# Patient Record
Sex: MALE | Race: WHITE | Employment: OTHER | URBAN - METROPOLITAN AREA
[De-identification: names, ages, dates, MRNs, and addresses within clinical notes are randomized per-mention and may not be internally consistent; named-entity substitution may affect disease eponyms.]

---

## 2018-11-25 ENCOUNTER — HOSPITAL ENCOUNTER (EMERGENCY)
Age: 73
Discharge: HOME | End: 2018-11-25
Payer: MEDICARE

## 2018-11-25 ENCOUNTER — APPOINTMENT (OUTPATIENT)
Dept: CT IMAGING | Age: 73
End: 2018-11-25
Payer: MEDICARE

## 2018-11-25 VITALS
DIASTOLIC BLOOD PRESSURE: 66 MMHG | SYSTOLIC BLOOD PRESSURE: 122 MMHG | TEMPERATURE: 97.8 F | HEIGHT: 69 IN | OXYGEN SATURATION: 92 % | RESPIRATION RATE: 18 BRPM | BODY MASS INDEX: 35.1 KG/M2 | WEIGHT: 237 LBS | HEART RATE: 92 BPM

## 2018-11-25 DIAGNOSIS — R19.7 DIARRHEA, UNSPECIFIED TYPE: Primary | ICD-10-CM

## 2018-11-25 LAB
A/G RATIO: 0.9 (ref 1.1–2.2)
ALBUMIN SERPL-MCNC: 3.6 G/DL (ref 3.4–5)
ALP BLD-CCNC: 99 U/L (ref 40–129)
ALT SERPL-CCNC: 17 U/L (ref 10–40)
ANION GAP SERPL CALCULATED.3IONS-SCNC: 13 MMOL/L (ref 3–16)
AST SERPL-CCNC: 41 U/L (ref 15–37)
BASOPHILS ABSOLUTE: 0.1 K/UL (ref 0–0.2)
BASOPHILS RELATIVE PERCENT: 0.9 %
BILIRUB SERPL-MCNC: 0.7 MG/DL (ref 0–1)
BUN BLDV-MCNC: 9 MG/DL (ref 7–20)
CALCIUM SERPL-MCNC: 9.1 MG/DL (ref 8.3–10.6)
CHLORIDE BLD-SCNC: 101 MMOL/L (ref 99–110)
CO2: 25 MMOL/L (ref 21–32)
CREAT SERPL-MCNC: 0.9 MG/DL (ref 0.8–1.3)
EOSINOPHILS ABSOLUTE: 0 K/UL (ref 0–0.6)
EOSINOPHILS RELATIVE PERCENT: 0.1 %
GFR AFRICAN AMERICAN: >60
GFR NON-AFRICAN AMERICAN: >60
GLOBULIN: 4.2 G/DL
GLUCOSE BLD-MCNC: 71 MG/DL (ref 70–99)
HCT VFR BLD CALC: 47.5 % (ref 40.5–52.5)
HEMOGLOBIN: 15.8 G/DL (ref 13.5–17.5)
LYMPHOCYTES ABSOLUTE: 2.5 K/UL (ref 1–5.1)
LYMPHOCYTES RELATIVE PERCENT: 31.7 %
MCH RBC QN AUTO: 31.1 PG (ref 26–34)
MCHC RBC AUTO-ENTMCNC: 33.3 G/DL (ref 31–36)
MCV RBC AUTO: 93.3 FL (ref 80–100)
MONOCYTES ABSOLUTE: 0.7 K/UL (ref 0–1.3)
MONOCYTES RELATIVE PERCENT: 9.6 %
NEUTROPHILS ABSOLUTE: 4.5 K/UL (ref 1.7–7.7)
NEUTROPHILS RELATIVE PERCENT: 57.7 %
PDW BLD-RTO: 17.9 % (ref 12.4–15.4)
PLATELET # BLD: 205 K/UL (ref 135–450)
PMV BLD AUTO: 8.8 FL (ref 5–10.5)
POTASSIUM SERPL-SCNC: 4.1 MMOL/L (ref 3.5–5.1)
RBC # BLD: 5.09 M/UL (ref 4.2–5.9)
SODIUM BLD-SCNC: 139 MMOL/L (ref 136–145)
TOTAL PROTEIN: 7.8 G/DL (ref 6.4–8.2)
WBC # BLD: 7.8 K/UL (ref 4–11)

## 2018-11-25 PROCEDURE — 85025 COMPLETE CBC W/AUTO DIFF WBC: CPT

## 2018-11-25 PROCEDURE — 99284 EMERGENCY DEPT VISIT MOD MDM: CPT

## 2018-11-25 PROCEDURE — 80053 COMPREHEN METABOLIC PANEL: CPT

## 2018-11-25 PROCEDURE — 74176 CT ABD & PELVIS W/O CONTRAST: CPT

## 2018-11-25 PROCEDURE — 2580000003 HC RX 258: Performed by: PHYSICIAN ASSISTANT

## 2018-11-25 RX ORDER — 0.9 % SODIUM CHLORIDE 0.9 %
1000 INTRAVENOUS SOLUTION INTRAVENOUS ONCE
Status: COMPLETED | OUTPATIENT
Start: 2018-11-25 | End: 2018-11-25

## 2018-11-25 RX ORDER — DIPHENOXYLATE HYDROCHLORIDE AND ATROPINE SULFATE 2.5; .025 MG/1; MG/1
1 TABLET ORAL 4 TIMES DAILY PRN
Qty: 20 TABLET | Refills: 0 | Status: SHIPPED | OUTPATIENT
Start: 2018-11-25 | End: 2018-12-05

## 2018-11-25 RX ADMIN — SODIUM CHLORIDE 1000 ML: 9 INJECTION, SOLUTION INTRAVENOUS at 22:00

## 2018-11-25 ASSESSMENT — ENCOUNTER SYMPTOMS
BLOOD IN STOOL: 0
SHORTNESS OF BREATH: 0
ABDOMINAL PAIN: 0
BACK PAIN: 0
VOMITING: 0
NAUSEA: 0
DIARRHEA: 1

## 2018-11-26 NOTE — ED PROVIDER NOTES
levels. Peritoneum/Retroperitoneum: No free air or free fluid. Calcified mesenteric masses are present, the ventral more superior mass measuring 26 x 19 mm in axial plane and 27 mm craniocaudal in the more inferior left lateral mass measuring 28 x 20 x 17 mm. Vascular:  Normal caliber abdominal aorta with heavy aortoiliac and branch vessel atherosclerotic calcifications PELVIS Genitourinary: Normal urinary bladder. Normal reproductive organs. Other: No free fluid. No enlarged lymph nodes. MUSCULOSKELETAL Bones and Soft Tissues: Partially imaged ununited sternotomy with nodular partially calcified areas along the sternum, possibly fat necrosis. Advanced lumbar spondylosis. Severe canal narrowing at L4-L5. Severe foraminal stenosis at L5-S1. Other: Subpleural reticular abnormality with traction bronchiolectasis at the lung bases. Mosaic attenuation with questionable ground-glass abnormality. Left pleural effusion with mild pleural thickening. Included heart demonstrates multivessel coronary calcifications and postsurgical changes of prior CABG. No acute abdominopelvic findings. Colonic air-fluid levels, consistent with history of diarrheal illness. Coarsely calcified mesenteric masses adjacent to loops of small bowel are indeterminate. 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 Left renal cortical scarring and volume loss. No evidence of urinary tract stone disease or obstructive uropathy. Features of idiopathic interstitial lung disease. Comparison imaging would be helpful if available. Recommend outpatient pulmonary medicine consultation if the patient is not already actively being followed by a pulmonologist. Neelam Joe left pleural effusion, presumably chronic given peripheral pleural thickening.         MEDICAL DECISION MAKING / ED COURSE:      PROCEDURES:   Procedures    None    Patient was given:  Medications 0.9 % sodium chloride bolus (0 mLs Intravenous Stopped 11/25/18 8160)       Patient present with diarrhea. No episodes of diarrhea here. His been a chronic issue. He does have history of gastric cancer. He is followed by a pulmonologist and a GI doctor in [place] where he lives and he is going back there in 2 days. Abdominal exam is benign denies any pain. Laboratory testing is unremarkable. There is multiple incidental findings on CT and the patient states he gets CTs every 6 months due to his history with his doctors in [place]. He will follow up closely with him. I have a low suspicion for bacterial etiology requiring emergent antibiotics for his diarrhea, Clostridium difficile, diverticulitis, bacterial colitis, appendicitis, perforated viscus, obstruction or other emergent etiology. Patient will follow up with outpatient return here for any worsening symptoms or problems. The patient tolerated their visit well. I evaluated the patient. The physician was available for consultation as needed. The patient and / or the family were informed of the results of anytests, a time was given to answer questions, a plan was proposed and they agreed with plan. CLINICAL IMPRESSION:  1. Diarrhea, unspecified type        DISPOSITION Decision To Discharge 11/25/2018 11:17:15 PM      PATIENT REFERRED TO:  Your pulmonologist and GI doctor in [place] when you return in 2 days    Call   For re-check    [facility] Emergency Department  [address] [place] [address]  [phone]    As needed      DISCHARGE MEDICATIONS:  Discharge Medication List as of 11/25/2018 11:23 PM      START taking these medications    Details   diphenoxylate-atropine (LOMOTIL) 2.5-0.025 MG per tablet Take 1 tablet by mouth 4 times daily as needed for Diarrhea for up to 10 days. ., Disp-20 tablet, R-0Print             DISCONTINUED MEDICATIONS:  Discharge Medication List as of 11/25/2018 11:23

## 2019-01-22 ENCOUNTER — HOSPITAL ENCOUNTER (OUTPATIENT)
Dept: VASCULAR LAB | Age: 74
Discharge: HOME | End: 2019-01-22
Payer: MEDICARE

## 2019-01-22 ENCOUNTER — HOSPITAL ENCOUNTER (OUTPATIENT)
Age: 74
Discharge: HOME | End: 2019-01-22
Payer: MEDICARE

## 2019-01-22 ENCOUNTER — HOSPITAL ENCOUNTER (OUTPATIENT)
Dept: GENERAL RADIOLOGY | Age: 74
Discharge: HOME | End: 2019-01-22
Payer: MEDICARE

## 2019-01-22 DIAGNOSIS — I70.213 ATHEROSCLEROSIS OF NATIVE ARTERY OF BOTH LOWER EXTREMITIES WITH INTERMITTENT CLAUDICATION (HCC): Primary | ICD-10-CM

## 2019-01-22 DIAGNOSIS — R52 PAIN: ICD-10-CM

## 2019-01-22 PROCEDURE — 93922 UPR/L XTREMITY ART 2 LEVELS: CPT

## 2019-01-22 PROCEDURE — 73630 X-RAY EXAM OF FOOT: CPT

## 2019-02-07 ENCOUNTER — HOSPITAL ENCOUNTER (OUTPATIENT)
Dept: WOUND CARE | Age: 74
Discharge: HOME | End: 2019-02-07
Payer: MEDICARE

## 2019-02-07 ENCOUNTER — HOSPITAL ENCOUNTER (OUTPATIENT)
Age: 74
Discharge: HOME | End: 2019-02-07
Payer: MEDICARE

## 2019-02-07 VITALS
HEART RATE: 84 BPM | HEIGHT: 69 IN | DIASTOLIC BLOOD PRESSURE: 83 MMHG | WEIGHT: 208 LBS | BODY MASS INDEX: 30.81 KG/M2 | SYSTOLIC BLOOD PRESSURE: 122 MMHG | RESPIRATION RATE: 16 BRPM

## 2019-02-07 DIAGNOSIS — L97.521 ULCERATED, FOOT, LEFT, LIMITED TO BREAKDOWN OF SKIN (HCC): ICD-10-CM

## 2019-02-07 DIAGNOSIS — L97.521 ULCERATED, FOOT, LEFT, LIMITED TO BREAKDOWN OF SKIN (HCC): Primary | ICD-10-CM

## 2019-02-07 LAB
C-REACTIVE PROTEIN: 5.1 MG/L (ref 0–5.1)
SEDIMENTATION RATE, ERYTHROCYTE: 30 MM/HR (ref 0–20)

## 2019-02-07 PROCEDURE — 87070 CULTURE OTHR SPECIMN AEROBIC: CPT

## 2019-02-07 PROCEDURE — 11042 DBRDMT SUBQ TIS 1ST 20SQCM/<: CPT

## 2019-02-07 PROCEDURE — 86140 C-REACTIVE PROTEIN: CPT

## 2019-02-07 PROCEDURE — 99213 OFFICE O/P EST LOW 20 MIN: CPT

## 2019-02-07 PROCEDURE — 36415 COLL VENOUS BLD VENIPUNCTURE: CPT

## 2019-02-07 PROCEDURE — 85652 RBC SED RATE AUTOMATED: CPT

## 2019-02-07 PROCEDURE — 87077 CULTURE AEROBIC IDENTIFY: CPT

## 2019-02-07 PROCEDURE — 87205 SMEAR GRAM STAIN: CPT

## 2019-02-07 PROCEDURE — 87186 SC STD MICRODIL/AGAR DIL: CPT

## 2019-02-07 RX ORDER — AMOXICILLIN AND CLAVULANATE POTASSIUM 875; 125 MG/1; MG/1
1 TABLET, FILM COATED ORAL 2 TIMES DAILY
Qty: 14 TABLET | Refills: 0 | Status: SHIPPED | OUTPATIENT
Start: 2019-02-07 | End: 2019-02-13

## 2019-02-07 RX ORDER — LIDOCAINE HYDROCHLORIDE 40 MG/ML
SOLUTION TOPICAL ONCE
Status: DISCONTINUED | OUTPATIENT
Start: 2019-02-07 | End: 2019-02-08 | Stop reason: HOSPADM

## 2019-02-07 RX ORDER — NAPROXEN SODIUM 220 MG
220 TABLET ORAL 2 TIMES DAILY WITH MEALS
COMMUNITY
End: 2021-01-01 | Stop reason: CLARIF

## 2019-02-07 RX ORDER — ASPIRIN 325 MG
325 TABLET ORAL DAILY
COMMUNITY
End: 2021-01-01 | Stop reason: CLARIF

## 2019-02-07 ASSESSMENT — PAIN DESCRIPTION - LOCATION: LOCATION: TOE (COMMENT WHICH ONE)

## 2019-02-07 ASSESSMENT — PAIN DESCRIPTION - DESCRIPTORS: DESCRIPTORS: POUNDING

## 2019-02-07 ASSESSMENT — PAIN SCALES - GENERAL: PAINLEVEL_OUTOF10: 2

## 2019-02-07 ASSESSMENT — PAIN DESCRIPTION - PAIN TYPE: TYPE: ACUTE PAIN

## 2019-02-07 ASSESSMENT — PAIN DESCRIPTION - ORIENTATION: ORIENTATION: LEFT

## 2019-02-09 LAB
GRAM STAIN RESULT: ABNORMAL
ORGANISM: ABNORMAL
ORGANISM: ABNORMAL
WOUND/ABSCESS: ABNORMAL

## 2019-02-11 ENCOUNTER — HOSPITAL ENCOUNTER (OUTPATIENT)
Dept: MRI IMAGING | Age: 74
Discharge: HOME | End: 2019-02-11
Payer: MEDICARE

## 2019-02-11 DIAGNOSIS — L97.521 ULCERATED, FOOT, LEFT, LIMITED TO BREAKDOWN OF SKIN (HCC): ICD-10-CM

## 2019-02-11 PROCEDURE — 6360000004 HC RX CONTRAST MEDICATION: Performed by: INTERNAL MEDICINE

## 2019-02-11 PROCEDURE — 2580000003 HC RX 258: Performed by: INTERNAL MEDICINE

## 2019-02-11 PROCEDURE — A9579 GAD-BASE MR CONTRAST NOS,1ML: HCPCS | Performed by: INTERNAL MEDICINE

## 2019-02-11 PROCEDURE — 73723 MRI JOINT LWR EXTR W/O&W/DYE: CPT

## 2019-02-11 RX ORDER — SODIUM CHLORIDE 0.9 % (FLUSH) 0.9 %
10 SYRINGE (ML) INJECTION ONCE
Status: COMPLETED | OUTPATIENT
Start: 2019-02-11 | End: 2019-02-11

## 2019-02-11 RX ADMIN — Medication 10 ML: at 15:38

## 2019-02-11 RX ADMIN — GADOTERIDOL 20 ML: 279.3 INJECTION, SOLUTION INTRAVENOUS at 15:38

## 2019-02-13 ENCOUNTER — HOSPITAL ENCOUNTER (OUTPATIENT)
Dept: WOUND CARE | Age: 74
Discharge: HOME | End: 2019-02-13
Payer: MEDICARE

## 2019-02-13 VITALS
TEMPERATURE: 97.4 F | BODY MASS INDEX: 30.72 KG/M2 | DIASTOLIC BLOOD PRESSURE: 79 MMHG | HEART RATE: 98 BPM | WEIGHT: 208 LBS | SYSTOLIC BLOOD PRESSURE: 113 MMHG

## 2019-02-13 PROCEDURE — 11043 DBRDMT MUSC&/FSCA 1ST 20/<: CPT

## 2019-02-13 RX ORDER — AMOXICILLIN AND CLAVULANATE POTASSIUM 875; 125 MG/1; MG/1
1 TABLET, FILM COATED ORAL 2 TIMES DAILY
Qty: 14 TABLET | Refills: 0 | Status: SHIPPED | OUTPATIENT
Start: 2019-02-13 | End: 2019-02-13

## 2019-02-13 RX ORDER — AMOXICILLIN AND CLAVULANATE POTASSIUM 875; 125 MG/1; MG/1
1 TABLET, FILM COATED ORAL 2 TIMES DAILY
Qty: 14 TABLET | Refills: 0 | Status: SHIPPED | OUTPATIENT
Start: 2019-02-13 | End: 2019-02-20

## 2019-02-13 RX ORDER — LIDOCAINE 40 MG/G
CREAM TOPICAL PRN
Status: DISCONTINUED | OUTPATIENT
Start: 2019-02-13 | End: 2019-02-14 | Stop reason: HOSPADM

## 2019-02-20 ENCOUNTER — HOSPITAL ENCOUNTER (OUTPATIENT)
Dept: WOUND CARE | Age: 74
Discharge: HOME | End: 2019-02-20
Payer: MEDICARE

## 2019-02-20 VITALS
TEMPERATURE: 98.2 F | SYSTOLIC BLOOD PRESSURE: 148 MMHG | HEART RATE: 90 BPM | RESPIRATION RATE: 16 BRPM | DIASTOLIC BLOOD PRESSURE: 67 MMHG

## 2019-02-20 PROCEDURE — 11042 DBRDMT SUBQ TIS 1ST 20SQCM/<: CPT

## 2019-02-20 RX ORDER — LIDOCAINE 40 MG/G
CREAM TOPICAL PRN
Status: DISCONTINUED | OUTPATIENT
Start: 2019-02-20 | End: 2019-02-21 | Stop reason: HOSPADM

## 2019-02-27 ENCOUNTER — HOSPITAL ENCOUNTER (OUTPATIENT)
Dept: WOUND CARE | Age: 74
Discharge: HOME | End: 2019-02-27
Payer: MEDICARE

## 2019-02-27 VITALS
SYSTOLIC BLOOD PRESSURE: 140 MMHG | HEART RATE: 92 BPM | DIASTOLIC BLOOD PRESSURE: 86 MMHG | RESPIRATION RATE: 16 BRPM | TEMPERATURE: 98.2 F

## 2019-02-27 PROCEDURE — 11042 DBRDMT SUBQ TIS 1ST 20SQCM/<: CPT

## 2019-02-27 RX ORDER — LIDOCAINE 40 MG/G
CREAM TOPICAL PRN
Status: DISCONTINUED | OUTPATIENT
Start: 2019-02-27 | End: 2019-02-28 | Stop reason: HOSPADM

## 2019-03-06 ENCOUNTER — HOSPITAL ENCOUNTER (OUTPATIENT)
Dept: WOUND CARE | Age: 74
Discharge: HOME | End: 2019-03-06

## 2019-03-13 ENCOUNTER — HOSPITAL ENCOUNTER (OUTPATIENT)
Dept: WOUND CARE | Age: 74
Discharge: HOME | End: 2019-03-13
Payer: MEDICARE

## 2019-03-13 VITALS
SYSTOLIC BLOOD PRESSURE: 102 MMHG | RESPIRATION RATE: 18 BRPM | HEART RATE: 82 BPM | DIASTOLIC BLOOD PRESSURE: 69 MMHG | TEMPERATURE: 96.8 F

## 2019-03-13 DIAGNOSIS — I73.9 PAD (PERIPHERAL ARTERY DISEASE) (HCC): ICD-10-CM

## 2019-03-13 DIAGNOSIS — L97.522 SKIN ULCER OF THIRD TOE OF LEFT FOOT WITH FAT LAYER EXPOSED (HCC): Primary | ICD-10-CM

## 2019-03-13 PROCEDURE — 11043 DBRDMT MUSC&/FSCA 1ST 20/<: CPT

## 2019-03-13 RX ORDER — LIDOCAINE 40 MG/G
CREAM TOPICAL PRN
Status: DISCONTINUED | OUTPATIENT
Start: 2019-03-13 | End: 2019-03-14 | Stop reason: HOSPADM

## 2019-03-13 RX ORDER — CLINDAMYCIN HYDROCHLORIDE 300 MG/1
300 CAPSULE ORAL 3 TIMES DAILY
Qty: 42 CAPSULE | Refills: 0 | Status: SHIPPED | OUTPATIENT
Start: 2019-03-13 | End: 2019-03-27

## 2019-03-20 ENCOUNTER — HOSPITAL ENCOUNTER (OUTPATIENT)
Age: 74
Discharge: HOME | End: 2019-03-20
Payer: MEDICARE

## 2019-03-20 ENCOUNTER — HOSPITAL ENCOUNTER (OUTPATIENT)
Dept: VASCULAR LAB | Age: 74
Discharge: HOME | End: 2019-03-20
Payer: MEDICARE

## 2019-03-20 ENCOUNTER — HOSPITAL ENCOUNTER (OUTPATIENT)
Dept: GENERAL RADIOLOGY | Age: 74
Discharge: HOME | End: 2019-03-20
Payer: MEDICARE

## 2019-03-20 DIAGNOSIS — I73.9 PAD (PERIPHERAL ARTERY DISEASE) (HCC): ICD-10-CM

## 2019-03-20 DIAGNOSIS — L97.522 SKIN ULCER OF THIRD TOE OF LEFT FOOT WITH FAT LAYER EXPOSED (HCC): ICD-10-CM

## 2019-03-20 PROCEDURE — 93926 LOWER EXTREMITY STUDY: CPT

## 2019-03-20 PROCEDURE — 73630 X-RAY EXAM OF FOOT: CPT

## 2019-03-22 ENCOUNTER — HOSPITAL ENCOUNTER (OUTPATIENT)
Dept: WOUND CARE | Age: 74
Discharge: HOME | End: 2019-03-22
Payer: MEDICARE

## 2019-03-22 VITALS
RESPIRATION RATE: 16 BRPM | SYSTOLIC BLOOD PRESSURE: 114 MMHG | TEMPERATURE: 96.9 F | HEART RATE: 83 BPM | DIASTOLIC BLOOD PRESSURE: 73 MMHG

## 2019-03-22 PROCEDURE — 99212 OFFICE O/P EST SF 10 MIN: CPT

## 2020-01-01 ENCOUNTER — APPOINTMENT (OUTPATIENT)
Dept: GENERAL RADIOLOGY | Age: 75
End: 2020-01-01
Payer: MEDICARE

## 2020-01-01 ENCOUNTER — APPOINTMENT (OUTPATIENT)
Dept: CT IMAGING | Age: 75
End: 2020-01-01
Payer: MEDICARE

## 2020-01-01 ENCOUNTER — HOSPITAL ENCOUNTER (EMERGENCY)
Age: 75
Discharge: HOME | End: 2020-10-19
Attending: EMERGENCY MEDICINE
Payer: MEDICARE

## 2020-01-01 VITALS
DIASTOLIC BLOOD PRESSURE: 71 MMHG | WEIGHT: 185 LBS | SYSTOLIC BLOOD PRESSURE: 105 MMHG | HEART RATE: 84 BPM | TEMPERATURE: 97.8 F | OXYGEN SATURATION: 96 % | HEIGHT: 70 IN | RESPIRATION RATE: 20 BRPM | BODY MASS INDEX: 26.48 KG/M2

## 2020-01-01 PROCEDURE — 73564 X-RAY EXAM KNEE 4 OR MORE: CPT

## 2020-01-01 PROCEDURE — 70450 CT HEAD/BRAIN W/O DYE: CPT

## 2020-01-01 PROCEDURE — 72125 CT NECK SPINE W/O DYE: CPT

## 2020-01-01 PROCEDURE — 99284 EMERGENCY DEPT VISIT MOD MDM: CPT

## 2020-01-01 RX ORDER — TRAMADOL HYDROCHLORIDE 50 MG/1
50 TABLET ORAL EVERY 8 HOURS PRN
Qty: 8 TABLET | Refills: 0 | Status: SHIPPED | OUTPATIENT
Start: 2020-01-01 | End: 2020-01-01

## 2020-01-01 ASSESSMENT — PAIN SCALES - GENERAL: PAINLEVEL_OUTOF10: 5

## 2020-01-01 ASSESSMENT — PAIN DESCRIPTION - PAIN TYPE: TYPE: ACUTE PAIN

## 2020-01-01 ASSESSMENT — ENCOUNTER SYMPTOMS
SHORTNESS OF BREATH: 0
ABDOMINAL PAIN: 0
NAUSEA: 0
COUGH: 0
WHEEZING: 0
DIARRHEA: 0
RHINORRHEA: 0
VOMITING: 0

## 2020-01-01 ASSESSMENT — PAIN DESCRIPTION - ORIENTATION: ORIENTATION: LEFT

## 2020-01-01 ASSESSMENT — PAIN DESCRIPTION - LOCATION: LOCATION: KNEE

## 2020-02-13 ENCOUNTER — APPOINTMENT (OUTPATIENT)
Dept: CT IMAGING | Age: 75
End: 2020-02-13
Payer: MEDICARE

## 2020-02-13 ENCOUNTER — HOSPITAL ENCOUNTER (EMERGENCY)
Age: 75
Discharge: HOME | End: 2020-02-13
Attending: EMERGENCY MEDICINE
Payer: MEDICARE

## 2020-02-13 VITALS
DIASTOLIC BLOOD PRESSURE: 69 MMHG | SYSTOLIC BLOOD PRESSURE: 125 MMHG | WEIGHT: 201 LBS | RESPIRATION RATE: 19 BRPM | TEMPERATURE: 97.9 F | HEART RATE: 62 BPM | OXYGEN SATURATION: 92 % | HEIGHT: 69 IN | BODY MASS INDEX: 29.77 KG/M2

## 2020-02-13 LAB
A/G RATIO: 1 (ref 1.1–2.2)
ALBUMIN SERPL-MCNC: 3.6 G/DL (ref 3.4–5)
ALP BLD-CCNC: 91 U/L (ref 40–129)
ALT SERPL-CCNC: 12 U/L (ref 10–40)
ANION GAP SERPL CALCULATED.3IONS-SCNC: 12 MMOL/L (ref 3–16)
AST SERPL-CCNC: 23 U/L (ref 15–37)
BASOPHILS ABSOLUTE: 0.1 K/UL (ref 0–0.2)
BASOPHILS RELATIVE PERCENT: 1.2 %
BILIRUB SERPL-MCNC: 0.7 MG/DL (ref 0–1)
BILIRUBIN URINE: NEGATIVE
BLOOD, URINE: NEGATIVE
BUN BLDV-MCNC: 11 MG/DL (ref 7–20)
CALCIUM SERPL-MCNC: 9.1 MG/DL (ref 8.3–10.6)
CHLORIDE BLD-SCNC: 102 MMOL/L (ref 99–110)
CLARITY: CLEAR
CO2: 23 MMOL/L (ref 21–32)
COLOR: YELLOW
CREAT SERPL-MCNC: 0.7 MG/DL (ref 0.8–1.3)
EOSINOPHILS ABSOLUTE: 0.1 K/UL (ref 0–0.6)
EOSINOPHILS RELATIVE PERCENT: 2 %
GFR AFRICAN AMERICAN: >60
GFR NON-AFRICAN AMERICAN: >60
GLOBULIN: 3.7 G/DL
GLUCOSE BLD-MCNC: 206 MG/DL (ref 70–99)
GLUCOSE URINE: NEGATIVE MG/DL
HCT VFR BLD CALC: 42.2 % (ref 40.5–52.5)
HEMOGLOBIN: 13.8 G/DL (ref 13.5–17.5)
KETONES, URINE: NEGATIVE MG/DL
LEUKOCYTE ESTERASE, URINE: NEGATIVE
LIPASE: 8 U/L (ref 13–60)
LYMPHOCYTES ABSOLUTE: 1.7 K/UL (ref 1–5.1)
LYMPHOCYTES RELATIVE PERCENT: 26.6 %
MCH RBC QN AUTO: 31.8 PG (ref 26–34)
MCHC RBC AUTO-ENTMCNC: 32.6 G/DL (ref 31–36)
MCV RBC AUTO: 97.4 FL (ref 80–100)
MICROSCOPIC EXAMINATION: NORMAL
MONOCYTES ABSOLUTE: 0.4 K/UL (ref 0–1.3)
MONOCYTES RELATIVE PERCENT: 6.3 %
NEUTROPHILS ABSOLUTE: 4 K/UL (ref 1.7–7.7)
NEUTROPHILS RELATIVE PERCENT: 63.9 %
NITRITE, URINE: NEGATIVE
PDW BLD-RTO: 16.3 % (ref 12.4–15.4)
PH UA: 6 (ref 5–8)
PLATELET # BLD: 177 K/UL (ref 135–450)
PMV BLD AUTO: 9.1 FL (ref 5–10.5)
POTASSIUM SERPL-SCNC: 4.6 MMOL/L (ref 3.5–5.1)
PROTEIN UA: NEGATIVE MG/DL
RBC # BLD: 4.34 M/UL (ref 4.2–5.9)
SODIUM BLD-SCNC: 137 MMOL/L (ref 136–145)
SPECIFIC GRAVITY UA: 1.01 (ref 1–1.03)
TOTAL PROTEIN: 7.3 G/DL (ref 6.4–8.2)
TROPONIN: <0.01 NG/ML
URINE REFLEX TO CULTURE: NORMAL
URINE TYPE: NORMAL
UROBILINOGEN, URINE: 0.2 E.U./DL
WBC # BLD: 6.3 K/UL (ref 4–11)

## 2020-02-13 PROCEDURE — 6370000000 HC RX 637 (ALT 250 FOR IP): Performed by: EMERGENCY MEDICINE

## 2020-02-13 PROCEDURE — 99284 EMERGENCY DEPT VISIT MOD MDM: CPT

## 2020-02-13 PROCEDURE — 81003 URINALYSIS AUTO W/O SCOPE: CPT

## 2020-02-13 PROCEDURE — 84484 ASSAY OF TROPONIN QUANT: CPT

## 2020-02-13 PROCEDURE — 80053 COMPREHEN METABOLIC PANEL: CPT

## 2020-02-13 PROCEDURE — 93005 ELECTROCARDIOGRAM TRACING: CPT | Performed by: EMERGENCY MEDICINE

## 2020-02-13 PROCEDURE — 2580000003 HC RX 258: Performed by: EMERGENCY MEDICINE

## 2020-02-13 PROCEDURE — 85025 COMPLETE CBC W/AUTO DIFF WBC: CPT

## 2020-02-13 PROCEDURE — 74176 CT ABD & PELVIS W/O CONTRAST: CPT

## 2020-02-13 PROCEDURE — 83690 ASSAY OF LIPASE: CPT

## 2020-02-13 RX ORDER — HYDROCODONE BITARTRATE AND ACETAMINOPHEN 5; 325 MG/1; MG/1
2 TABLET ORAL ONCE
Status: COMPLETED | OUTPATIENT
Start: 2020-02-13 | End: 2020-02-13

## 2020-02-13 RX ORDER — HYDROCODONE BITARTRATE AND ACETAMINOPHEN 5; 325 MG/1; MG/1
1 TABLET ORAL EVERY 6 HOURS PRN
Qty: 16 TABLET | Refills: 0 | Status: SHIPPED | OUTPATIENT
Start: 2020-02-13 | End: 2020-02-18

## 2020-02-13 RX ORDER — ONDANSETRON 4 MG/1
4 TABLET, ORALLY DISINTEGRATING ORAL ONCE
Status: COMPLETED | OUTPATIENT
Start: 2020-02-13 | End: 2020-02-13

## 2020-02-13 RX ORDER — CYCLOBENZAPRINE HCL 10 MG
10 TABLET ORAL 3 TIMES DAILY PRN
Qty: 20 TABLET | Refills: 0 | Status: SHIPPED | OUTPATIENT
Start: 2020-02-13 | End: 2020-02-23

## 2020-02-13 RX ORDER — SODIUM CHLORIDE, SODIUM LACTATE, POTASSIUM CHLORIDE, CALCIUM CHLORIDE 600; 310; 30; 20 MG/100ML; MG/100ML; MG/100ML; MG/100ML
1000 INJECTION, SOLUTION INTRAVENOUS ONCE
Status: COMPLETED | OUTPATIENT
Start: 2020-02-13 | End: 2020-02-13

## 2020-02-13 RX ORDER — ZOLPIDEM TARTRATE 5 MG/1
TABLET ORAL
COMMUNITY
End: 2021-01-01 | Stop reason: CLARIF

## 2020-02-13 RX ORDER — LEVOTHYROXINE SODIUM 88 UG/1
TABLET ORAL
COMMUNITY
Start: 2020-01-03 | End: 2021-01-01 | Stop reason: CLARIF

## 2020-02-13 RX ADMIN — HYDROCODONE BITARTRATE AND ACETAMINOPHEN 2 TABLET: 5; 325 TABLET ORAL at 17:44

## 2020-02-13 RX ADMIN — ONDANSETRON 4 MG: 4 TABLET, ORALLY DISINTEGRATING ORAL at 17:44

## 2020-02-13 RX ADMIN — SODIUM CHLORIDE, POTASSIUM CHLORIDE, SODIUM LACTATE AND CALCIUM CHLORIDE 1000 ML: 600; 310; 30; 20 INJECTION, SOLUTION INTRAVENOUS at 17:43

## 2020-02-13 ASSESSMENT — PAIN SCALES - GENERAL
PAINLEVEL_OUTOF10: 5
PAINLEVEL_OUTOF10: 5

## 2020-02-13 NOTE — ED NOTES
Pt is on a continuous pulse oximetry and telemetry monitoring. Pt continued on cycling blood pressure. Fall risk precautions in place, call light in reach, bed side table within reach,  will continue to monitor.            Violet Costa RN  02/13/20 9957

## 2020-02-13 NOTE — ED NOTES
Bed: 07  Expected date:   Expected time:   Means of arrival: Walk In  Comments:     Esperanza Marks RN  02/13/20 9445

## 2020-02-13 NOTE — ED PROVIDER NOTES
Transportation needs:     Medical: Not on file     Non-medical: Not on file   Tobacco Use    Smoking status: Former Smoker    Smokeless tobacco: Never Used   Substance and Sexual Activity    Alcohol use: Not Currently    Drug use: Not on file    Sexual activity: Not on file   Lifestyle    Physical activity:     Days per week: Not on file     Minutes per session: Not on file    Stress: Not on file   Relationships    Social connections:     Talks on phone: Not on file     Gets together: Not on file     Attends [employer] service: Not on file     Active member of club or organization: Not on file     Attends meetings of clubs or organizations: Not on file     Relationship status: Not on file    Intimate partner violence:     Fear of current or ex partner: Not on file     Emotionally abused: Not on file     Physically abused: Not on file     Forced sexual activity: Not on file   Other Topics Concern    Not on file   Social History Narrative    Not on file     No current facility-administered medications for this encounter. Current Outpatient Medications   Medication Sig Dispense Refill    levothyroxine (SYNTHROID) 88 MCG tablet       zolpidem (AMBIEN) 5 MG tablet zolpidem 5 mg tablet      cyclobenzaprine (FLEXERIL) 10 MG tablet Take 1 tablet by mouth 3 times daily as needed for Muscle spasms (CAUTION: This medication can cause dizziness.  Do not drive or operate heavy machinery when on this medication.) 20 tablet 0    HYDROcodone-acetaminophen (NORCO) 5-325 MG per tablet Take 1 tablet by mouth every 6 hours as needed for Pain (CAUTION: This medication can cause dizziness.  Do not drive or operate heavy machinery when on this medication.) for up to 5 days.  16 tablet 0    aspirin 325 MG tablet Take 325 mg by mouth daily      naproxen sodium (ALEVE) 220 MG tablet Take 220 mg by mouth 2 times daily (with meals)      DULoxetine HCl (CYMBALTA PO) Take by mouth      Budesonide-Formoterol Fumarate (SYMBICORT IN) Inhale into the lungs      tiotropium (SPIRIVA) 18 MCG inhalation capsule Inhale 18 mcg into the lungs daily      METFORMIN HCL PO Take by mouth      Insulin NPH Isophane & Regular (NOVOLIN 70/30 FLEXPEN) (70-30) 100 UNIT per ML injection pen Inject into the skin 2 times daily       Allergies   Allergen Reactions    Dilaudid [Hydromorphone Hcl]     Morphine        REVIEW OF SYSTEMS  10 systems reviewed, pertinent positives per HPI otherwise noted to be negative. PHYSICAL EXAM  /69   Pulse 62   Temp 97.9 °F (36.6 °C) (Infrared)   Resp 19   Ht 5' 9\" (1.753 m)   Wt 201 lb (91.2 kg)   SpO2 92%   BMI 29.68 kg/m²    GENERAL APPEARANCE: Awake and alert. Cooperative. No distress. HENT: Normocephalic. Atraumatic. Mucous membranes are moist.  NECK: Supple. EYES: PERRL. EOM's grossly intact. HEART/CHEST: RRR. No murmurs. No chest wall tenderness. LUNGS: Respirations unlabored. CTAB. Good air exchange. Speaking comfortably in full sentences. BACK/ABDOMEN: No abdominal tenderness. Soft. Non-distended. No masses. No organomegaly. No guarding or rebound. Normal bowel sounds throughout. Isolated left flank tenderness at the 12th rib laterally. There is no bruising erythema warmth or rash over this area. There is no crepitus. There is no tenderness in the cervical thoracic or lumbar spine. No tenderness of the right flank. MUSCULOSKELETAL: No extremity edema. Compartments soft. No deformity. No tenderness in the extremities. All extremities neurovascularly intact. SKIN: Warm and dry. No acute rashes. NEUROLOGICAL: Alert and oriented. CN's 2-12 intact. No gross facial drooping. Strength 5/5, sensation intact. 2 plus DTR's in knees bilaterally. Gait normal.  PSYCHIATRIC: Normal mood and affect. LABS  I have reviewed all labs for this visit.    Results for orders placed or performed during the hospital encounter of 02/13/20   CBC Auto Differential   Result Value Ref Range    WBC fibrotic pattern. 3.  Right lower lobe bronchiectasis. No acute consolidation or effusion. 4.  Atrophic appearing left kidney with suspected hyperdense cyst. 5.  Right-sided renal pelviectasis and right renal cysts. No obstructing ureteral calculus. 6.  No bowel obstruction or appendicitis. Bowel contusion is difficult to unequivocally exclude without oral or IV contrast.  There is some fluid in the jejunal loops. 7.  Calcifications in the peritoneum left side likely calcified lymph nodes. 8.  Atherosclerotic disease including the coronary arteries and aorta. ED COURSE/MDM  Patient seen and evaluated. Old records reviewed. Labs and imaging reviewed and results discussed with patient. After initial evaluation, differential diagnostic considerations included: kidney stone, pyelonephritis, UTI, appendicitis, bowel obstruction, diverticulitis, gastroenteritis    The patient's ED workup was notable for left flank pain, that is reproducible on exam and consistent with musculoskeletal etiology, oblique strain/contusion although symptoms appear to be from an injury that occurred a month ago. CT chest abdomen pelvis shows no acute traumatic findings. Given the time elapsed from injury to presentation I do not suspect occult injury. Patient felt much better after Norco.  He is on NSAIDs twice daily however has an atrophic kidney that he knew about so I cautioned him to minimize NSAID use and will prescribe him Flexeril in the short-term as well. No fever. Incentive spirometer with teaching advised and primary care follow-up recommended. CT also shows incidental findings including mediastinal adenopathy, some fluid in the jejunal loops which could be consistent with his intermittent diarrhea but no inflammation or indication for antibiotics is present. He was also told about his incidental atherosclerosis.   Does not appear consistent with an atypical cardiac or pulmonary presentation and troponin is negative and EKG is nonischemic. During the patient's ED course, the patient was given:  Medications   lactated ringers infusion 1,000 mL (1,000 mLs Intravenous New Bag 2/13/20 1743)   HYDROcodone-acetaminophen (NORCO) 5-325 MG per tablet 2 tablet (2 tablets Oral Given 2/13/20 1744)   ondansetron (ZOFRAN-ODT) disintegrating tablet 4 mg (4 mg Oral Given 2/13/20 1744)        CLINICAL IMPRESSION  1. Acute left flank pain        Blood pressure 125/69, pulse 62, temperature 97.9 °F (36.6 °C), temperature source Infrared, resp. rate 19, height 5' 9\" (1.753 m), weight 201 lb (91.2 kg), SpO2 92 %. DISPOSITION  Anel Ramires was discharged to home in stable condition. I have discussed the findings of today's workup with the patient and addressed the patient's questions and concerns. Important warning signs as well as new or worsening symptoms which would necessitate immediate return to the ED were discussed. The plan is to discharge from the ED at this time, and the patient is in stable condition. The patient acknowledged understanding is agreeable with this plan. Patient was given scripts for the following medications. I counseled patient how to take these medications. New Prescriptions    CYCLOBENZAPRINE (FLEXERIL) 10 MG TABLET    Take 1 tablet by mouth 3 times daily as needed for Muscle spasms (CAUTION: This medication can cause dizziness.  Do not drive or operate heavy machinery when on this medication.)    HYDROCODONE-ACETAMINOPHEN (NORCO) 5-325 MG PER TABLET    Take 1 tablet by mouth every 6 hours as needed for Pain (CAUTION: This medication can cause dizziness.  Do not drive or operate heavy machinery when on this medication.) for up to 5 days.        Follow-up with:  Gabby Chang MD  [address], [address]  [address]  [phone]    Schedule an appointment as soon as possible for a visit in 2 days  For symptom re-evaluation    [facility] Emergency Department  [address]

## 2020-02-14 LAB
EKG ATRIAL RATE: 65 BPM
EKG DIAGNOSIS: NORMAL
EKG P AXIS: 70 DEGREES
EKG P-R INTERVAL: 300 MS
EKG Q-T INTERVAL: 390 MS
EKG QRS DURATION: 70 MS
EKG QTC CALCULATION (BAZETT): 405 MS
EKG R AXIS: 35 DEGREES
EKG T AXIS: 17 DEGREES
EKG VENTRICULAR RATE: 65 BPM

## 2020-02-14 PROCEDURE — 93010 ELECTROCARDIOGRAM REPORT: CPT | Performed by: INTERNAL MEDICINE

## 2020-10-19 NOTE — ED PROVIDER NOTES
I independently performed a history and physical on Herminio Rogers. All diagnostic, treatment, and disposition decisions were made by myself in conjunction with the advanced practice provider. Briefly, this is a 75 y.o. male here for fall which occurred 3 days ago. Patient states he was bending down in the laundry room to  some rugs when he lost his balance and fell forward, striking the right side of his head and face against the floor. Also landed on his left knee and has had knee pain and swelling. Denies any headache, vision changes, vomiting, weakness or numbness. He has been ambulatory since the fall. On exam, Patient afebrile and nontoxic. No distress. Heart RRR. Lungs CTAB. Abdomen soft, nondistended, nontender to palpation in all quadrants. 5 out of 5 motor and sensation grossly intact all extremities. Neck is supple, full range of motion without pain. There is diffuse thoracic paraspinal and cervical paraspinal tenderness. No focal midline tenderness or step-offs. Left knee with anterior and lateral effusion, full active extension and flexion. Tenderness palpation over medial joint line. Stable anterior and posterior drawer. DP 2+ and symmetric. Screenings            MDM    Patient afebrile and nontoxic. No distress. CT head and cervical spine without evidence of hemorrhage, mass-effect or acute fracture. Neuro exam is benign, no findings to suggest cord injury or cauda equina. Plain films left knee without acute fracture or dislocation. Lower extremity is vascularly intact. No findings to suggest joint space infection. Patient felt safe for discharge to self-care with close PCP follow-up and he does feel comfortable returning to home.   Will prescribe short course of tramadol for pain, I discussed risk versus benefits of falling at home and patient does request this medication understanding these risks, I counseled patient to stop taking this medication immediately should he feel dizzy or unsteady on his feet. Counseled on importance of orthopedic follow-up to evaluate for potential ligamentous or meniscal knee injury. Strict return precautions were discussed with both patient and his wife. Patient Referrals:  Pearl Fink MD  [facility], [address] [place] [address]  [address] [place] [address] Road  [phone]    Call   For a re-check in  4-5  days if no improvement    [facility] Emergency Department  [address] [facility] [facility]  [phone]  Go to   If symptoms worsen      Discharge Medications:  New Prescriptions    TRAMADOL (ULTRAM) 50 MG TABLET    Take 1 tablet by mouth every 8 hours as needed for Pain for up to 3 days. Intended supply: 3 days. Take lowest dose possible to manage pain       FINAL IMPRESSION  1. Fall, initial encounter    2. Injury of head, initial encounter    3. Injury of left knee, initial encounter        Blood pressure 105/71, pulse 84, temperature 97.8 °F (36.6 °C), temperature source Infrared, resp. rate 20, height 5' 10\" (1.778 m), weight 185 lb (83.9 kg), SpO2 96 %. For further details of [facility] emergency department encounter, please see documentation by advanced practice provider, ZUNILDA Gandara.     Inés Briggs DO (electronically signed)  Attending Emergency Physician       Inés Briggs DO  10/19/20 7974

## 2020-10-19 NOTE — ED PROVIDER NOTES
[facility]        Pt Name: Laurent Nicolas  MRN: [id number]  [place] 1945  Date of evaluation: 10/19/2020  Provider: Rhonda Fox PA-C  PCP: Phillip Wang MD     I have seen and evaluated this patient with my supervising physician No att. providers found. CHIEF COMPLAINT       Chief Complaint   Patient presents with    Fall     pt states he fell this past friday and had pain to left knee. Pt states he hit his head but no loc. HISTORY OF PRESENT ILLNESS   (Location, Timing/Onset, Context/Setting, Quality, Duration, Modifying Factors, Severity, Associated Signs and Symptoms)  Note limiting factors. Laurent Nicolas is a 75 y.o. male who presents for evaluation of left knee  pain and head injury status post fall that occurred 3 days ago. Patient states he was bending over to  his laundry when he lost his balance, fell forward, landing on his left knee. He states that he did hit his head but denies any loss of consciousness. He is not anticoagulated. He has mild neck pain. No back pain. No numbness tingling weakness distally. No saddle anesthesia, bladder retention or bowel incontinence. States that he is having difficulty walking secondary pain in his left knee reports swelling and states that he feels like there is a step-off or crack and is concerned that it is \"busted. \"  He took aspirin for pain relief and was using ice prior to arrival.  He has no other injuries or complaints at this time. Nursing Notes were all reviewed and agreed with or any disagreements were addressed in the HPI. REVIEW OF SYSTEMS    (2-9 systems for level 4, 10 or more for level 5)     Review of Systems   Constitutional: Negative for appetite change, chills and fever. HENT: Negative for congestion and rhinorrhea. Respiratory: Negative for cough, shortness of breath and wheezing.     Cardiovascular: Negative for Used   Substance Use Topics    Alcohol use: Not Currently    Drug use: Not on file       SCREENINGS             PHYSICAL EXAM    (up to 7 for level 4, 8 or more for level 5)     ED Triage Vitals [10/19/20 1515]   BP Temp Temp Source Pulse Resp SpO2 Height Weight   105/71 97.8 °F (36.6 °C) Infrared 84 20 96 % 5' 10\" (1.778 m) 185 lb (83.9 kg)       Physical Exam  Vitals signs and nursing note reviewed. Constitutional:       Appearance: He is well-developed. He is not diaphoretic. HENT:      Head: Normocephalic and atraumatic. Right Ear: External ear normal.      Left Ear: External ear normal.      Nose: Nose normal.   Eyes:      General:         Right eye: No discharge. Left eye: No discharge. Neck:      Musculoskeletal: Normal range of motion and neck supple. Cardiovascular:      Pulses: Normal pulses. Pulmonary:      Effort: Pulmonary effort is normal. No respiratory distress. Musculoskeletal:      Left knee: He exhibits decreased range of motion, swelling and effusion. He exhibits no deformity. Tenderness found. Skin:     General: Skin is warm and dry. Neurological:      Mental Status: He is alert and oriented to person, place, and time. Mental status is at baseline. GCS: GCS eye subscore is 4. GCS verbal subscore is 5. GCS motor subscore is 6. Cranial Nerves: Cranial nerves are intact. Sensory: Sensation is intact. Motor: Motor function is intact. Psychiatric:         Behavior: Behavior normal.         DIAGNOSTIC RESULTS   LABS:    Labs Reviewed - No data to display    All other labs were within normal range or not returned as of this dictation. EKG: All EKG's are interpreted by the Emergency Department Physician in the absence of a cardiologist.  Please see their note for interpretation of EKG.       RADIOLOGY:   Non-plain film images such as CT, Ultrasound and MRI are read by the radiologist. Plain radiographic images are visualized and preliminarily interpreted by the ED Provider with the below findings:        Interpretation per the Radiologist below, if available at the time of this note:    CT Cervical Spine WO Contrast   Final Result   No acute abnormality of the cervical spine. No acute fracture or traumatic   malalignment. The upper thoracic esophagus is dilated and food/debris filled. Follow-up   barium swallow and or endoscopy recommended for further evaluation. CT Head WO Contrast   Final Result   No evidence for hemorrhage or hematoma no mass-effect or midline shift. Mild   volume loss is evident. Note is made of some erosive changes posterior   margins temporal bone on the left. .  The findings are most suggestive of   arachnoid granulation however other etiology cannot be totally excluded. Further evaluation MRI temporal bones-internal auditory canal with gadolinium   may be beneficial to exclude other etiology, as clinically indicated      RECOMMENDATIONS:   Consider MRI temporal bones/ internal auditory canals with gadolinium to   further evaluate findings suspicious for arachnoid granulation of the   posterior margin of the left temporal bone. XR KNEE LEFT (MIN 4 VIEWS)   Preliminary Result   Degenerative changes most severe in the anterior compartment. No definite acute fracture noted given limitations from underlying osteopenia. No results found. PROCEDURES   Unless otherwise noted below, none     Procedures    CRITICAL CARE TIME   N/A    CONSULTS:  None      EMERGENCY DEPARTMENT COURSE and DIFFERENTIAL DIAGNOSIS/MDM:   Vitals:    Vitals:    10/19/20 1515   BP: 105/71   Pulse: 84   Resp: 20   Temp: 97.8 °F (36.6 °C)   TempSrc: Infrared   SpO2: 96%   Weight: 185 lb (83.9 kg)   Height: 5' 10\" (1.778 m)       Patient was given the following medications:  Medications - No data to display        Patient presents for evaluation of left knee injury status post mechanical fall.   On exam, he is chronically ill-appearing resting in wheelchair in no acute distress and nontoxic. Vitals are stable and he is afebrile. He is alert oriented x3 produces 15. Cranial nerves II through XII are intact. Scalp is atraumatic, neck and back are nontender. He does have tenderness and swelling with effusion to the left knee. There is no bony step-offs crepitus obvious deformity or dislocation he is neurovascularly intact distally. Range of motion decreased secondary to pain and swelling. He declined need for any additional pain medication at this time and will be reevaluated. CT the head shows no acute intracranial abnormality. No evidence for hemorrhage, hematoma or mass-effect or midline shift. There is some erosive changes posterior margins of the temporal bones suggestive of arachnoid granulation. MRI recommended if clinically indicated. There is no clinical indication for this. Encouraged follow-up with PCP for outpatient work-up as needed. CT of cervical spine shows no acute abnormalities. X-ray left knee shows degenerative changes in the anterior compartment. No definite acute fractures. He is given Ace wrap and tramadol for symptomatic and supportive care. Additional measures were discussed including rest, ice and elevation. I estimate there is LOW risk for SKULL FRACTURE, INTRACRANIAL HEMORRHAGE, CERVICAL SPINE INJURY, SUBDURAL OR EPIDURAL HEMATOMA,  thus I consider the discharge disposition reasonable. I estimate there is LOW risk for COMPARTMENT SYNDROME, DEEP VENOUS THROMBOSIS, SEPTIC ARTHRITIS, TENDON OR NEUROVASCULAR INJURY, thus I consider the discharge disposition reasonable. Encouraged to follow-up for reevaluation within 1 week if there is no significant symptomatic improvement. Conditions for return to the ED were also discussed such as any new or worsening symptoms or signs of marked tender chondroblastic, rest, tractable pain or inability to ambulate.   He is agreeable to this plan and stable for discharge at this time. FINAL IMPRESSION      1. Fall, initial encounter    2. Injury of head, initial encounter    3. Injury of left knee, initial encounter          DISPOSITION/PLAN   DISPOSITION Decision To Discharge 10/19/2020 05:30:22 PM      PATIENT REFERREDTO:  Mayank Vergara MD  [address] [facility], [address]  [address]  [phone]    Call   For a re-check in  4-5  days if no improvement    [facility] Emergency Department  [address] [facility]  [phone]  Go to   If symptoms worsen    Richie Reddy MD  [address] [facility]  [address]  [phone]    In 3 days  Orthopedics for your left knee pain. DISCHARGE MEDICATIONS:  Discharge Medication List as of 10/19/2020  5:33 PM      START taking these medications    Details   traMADol (ULTRAM) 50 MG tablet Take 1 tablet by mouth every 8 hours as needed for Pain for up to 3 days. Intended supply: 3 days.  Take lowest dose possible to manage pain, Disp-8 tablet,R-0Print             DISCONTINUED MEDICATIONS:  Discharge Medication List as of 10/19/2020  5:33 PM                 (Please note that portions of this note were completed with a voice recognition program.  Efforts were made to edit the dictations but occasionally words are mis-transcribed.)    Aracelis Almaguer PA-C (electronically signed)           Ramya Connolly PA-C  10/19/20 [id number]

## 2021-01-01 ENCOUNTER — HOSPITAL ENCOUNTER (EMERGENCY)
Age: 76
DRG: 871 | End: 2021-05-05
Attending: EMERGENCY MEDICINE | Admitting: STUDENT IN AN ORGANIZED HEALTH CARE EDUCATION/TRAINING PROGRAM
Payer: MEDICARE

## 2021-01-01 ENCOUNTER — HOSPITAL ENCOUNTER (OUTPATIENT)
Age: 76
Discharge: HOME | End: 2021-03-23
Payer: MEDICARE

## 2021-01-01 ENCOUNTER — APPOINTMENT (OUTPATIENT)
Dept: GENERAL RADIOLOGY | Age: 76
DRG: 871 | End: 2021-01-01
Payer: MEDICARE

## 2021-01-01 ENCOUNTER — TELEPHONE (OUTPATIENT)
Dept: OTHER | Facility: CLINIC | Age: 76
End: 2021-01-01

## 2021-01-01 ENCOUNTER — APPOINTMENT (OUTPATIENT)
Dept: CT IMAGING | Age: 76
DRG: 193 | End: 2021-01-01
Payer: MEDICARE

## 2021-01-01 ENCOUNTER — APPOINTMENT (OUTPATIENT)
Dept: CT IMAGING | Age: 76
End: 2021-01-01
Payer: MEDICARE

## 2021-01-01 ENCOUNTER — APPOINTMENT (OUTPATIENT)
Dept: GENERAL RADIOLOGY | Age: 76
DRG: 193 | End: 2021-01-01
Payer: MEDICARE

## 2021-01-01 ENCOUNTER — OFFICE VISIT (OUTPATIENT)
Dept: PULMONOLOGY | Age: 76
End: 2021-01-01
Payer: MEDICARE

## 2021-01-01 ENCOUNTER — HOSPITAL ENCOUNTER (INPATIENT)
Age: 76
LOS: 7 days | Discharge: NURSING, REHAB OR LONG-TERM CARE | DRG: 871 | End: 2021-04-27
Attending: EMERGENCY MEDICINE | Admitting: INTERNAL MEDICINE
Payer: MEDICARE

## 2021-01-01 ENCOUNTER — TELEPHONE (OUTPATIENT)
Dept: ADMISSION | Age: 76
End: 2021-01-01

## 2021-01-01 ENCOUNTER — TELEPHONE (OUTPATIENT)
Dept: PULMONOLOGY | Age: 76
End: 2021-01-01

## 2021-01-01 ENCOUNTER — HOSPITAL ENCOUNTER (INPATIENT)
Age: 76
LOS: 2 days | Discharge: HOME | DRG: 193 | End: 2021-04-16
Attending: EMERGENCY MEDICINE | Admitting: INTERNAL MEDICINE
Payer: MEDICARE

## 2021-01-01 ENCOUNTER — HOSPITAL ENCOUNTER (EMERGENCY)
Age: 76
Discharge: ANOTHER HOSPITAL | End: 2021-04-17
Attending: EMERGENCY MEDICINE
Payer: MEDICARE

## 2021-01-01 VITALS
TEMPERATURE: 98 F | SYSTOLIC BLOOD PRESSURE: 110 MMHG | OXYGEN SATURATION: 93 % | DIASTOLIC BLOOD PRESSURE: 62 MMHG | RESPIRATION RATE: 20 BRPM | HEART RATE: 80 BPM | HEIGHT: 68 IN | BODY MASS INDEX: 21.82 KG/M2 | WEIGHT: 144 LBS

## 2021-01-01 VITALS
SYSTOLIC BLOOD PRESSURE: 135 MMHG | DIASTOLIC BLOOD PRESSURE: 79 MMHG | RESPIRATION RATE: 18 BRPM | TEMPERATURE: 97.1 F | OXYGEN SATURATION: 100 % | HEART RATE: 77 BPM

## 2021-01-01 VITALS
OXYGEN SATURATION: 92 % | TEMPERATURE: 97.7 F | BODY MASS INDEX: 21.82 KG/M2 | RESPIRATION RATE: 18 BRPM | HEIGHT: 68 IN | HEART RATE: 95 BPM | DIASTOLIC BLOOD PRESSURE: 65 MMHG | SYSTOLIC BLOOD PRESSURE: 101 MMHG | WEIGHT: 144 LBS

## 2021-01-01 VITALS
HEIGHT: 70 IN | WEIGHT: 185 LBS | DIASTOLIC BLOOD PRESSURE: 62 MMHG | SYSTOLIC BLOOD PRESSURE: 110 MMHG | HEART RATE: 63 BPM | OXYGEN SATURATION: 90 % | BODY MASS INDEX: 26.48 KG/M2

## 2021-01-01 VITALS
WEIGHT: 144 LBS | BODY MASS INDEX: 21.9 KG/M2 | SYSTOLIC BLOOD PRESSURE: 51 MMHG | DIASTOLIC BLOOD PRESSURE: 21 MMHG | RESPIRATION RATE: 21 BRPM | TEMPERATURE: 97.9 F

## 2021-01-01 DIAGNOSIS — M96.89 NONUNION OF STERNUM AFTER STERNOTOMY: ICD-10-CM

## 2021-01-01 DIAGNOSIS — A41.9 SEPTIC SHOCK (HCC): Primary | ICD-10-CM

## 2021-01-01 DIAGNOSIS — R94.31 ACUTE ELECTROCARDIOGRAM CHANGES: ICD-10-CM

## 2021-01-01 DIAGNOSIS — R77.8 ELEVATED TROPONIN: ICD-10-CM

## 2021-01-01 DIAGNOSIS — N17.9 ACUTE KIDNEY INJURY (HCC): ICD-10-CM

## 2021-01-01 DIAGNOSIS — J96.21 ACUTE ON CHRONIC RESPIRATORY FAILURE WITH HYPOXIA (HCC): ICD-10-CM

## 2021-01-01 DIAGNOSIS — R22.2 LUMP IN CHEST: Primary | ICD-10-CM

## 2021-01-01 DIAGNOSIS — J18.9 MULTIFOCAL PNEUMONIA: Primary | ICD-10-CM

## 2021-01-01 DIAGNOSIS — R22.2 LUMP IN CHEST: ICD-10-CM

## 2021-01-01 DIAGNOSIS — R74.01 TRANSAMINITIS: ICD-10-CM

## 2021-01-01 DIAGNOSIS — R65.21 SEPTIC SHOCK (HCC): Primary | ICD-10-CM

## 2021-01-01 DIAGNOSIS — U07.1 COVID-19: ICD-10-CM

## 2021-01-01 DIAGNOSIS — F23 ACUTE PSYCHOSIS (HCC): Primary | ICD-10-CM

## 2021-01-01 DIAGNOSIS — J44.9 COPD, SEVERITY TO BE DETERMINED (HCC): ICD-10-CM

## 2021-01-01 DIAGNOSIS — R41.82 ALTERED MENTAL STATUS, UNSPECIFIED ALTERED MENTAL STATUS TYPE: ICD-10-CM

## 2021-01-01 DIAGNOSIS — N39.0 URINARY TRACT INFECTION WITHOUT HEMATURIA, SITE UNSPECIFIED: Primary | ICD-10-CM

## 2021-01-01 LAB
A/G RATIO: 0.5 (ref 1.1–2.2)
A/G RATIO: 0.6 (ref 1.1–2.2)
ACETAMINOPHEN LEVEL: <5 UG/ML (ref 10–30)
ALBUMIN SERPL-MCNC: 2.2 G/DL (ref 3.4–5)
ALBUMIN SERPL-MCNC: 2.3 G/DL (ref 3.4–5)
ALBUMIN SERPL-MCNC: 2.3 G/DL (ref 3.4–5)
ALBUMIN SERPL-MCNC: 2.4 G/DL (ref 3.4–5)
ALBUMIN SERPL-MCNC: 2.5 G/DL (ref 3.4–5)
ALBUMIN SERPL-MCNC: 3 G/DL (ref 3.4–5)
ALP BLD-CCNC: 186 U/L (ref 40–129)
ALP BLD-CCNC: 216 U/L (ref 40–129)
ALP BLD-CCNC: 251 U/L (ref 40–129)
ALP BLD-CCNC: 316 U/L (ref 40–129)
ALP BLD-CCNC: 399 U/L (ref 40–129)
ALT SERPL-CCNC: 122 U/L (ref 10–40)
ALT SERPL-CCNC: 145 U/L (ref 10–40)
ALT SERPL-CCNC: 175 U/L (ref 10–40)
ALT SERPL-CCNC: 45 U/L (ref 10–40)
ALT SERPL-CCNC: 67 U/L (ref 10–40)
AMMONIA: 21 UMOL/L (ref 16–60)
AMMONIA: 25 UMOL/L (ref 16–60)
AMPHETAMINE SCREEN, URINE: NORMAL
ANION GAP SERPL CALCULATED.3IONS-SCNC: 10 MMOL/L (ref 3–16)
ANION GAP SERPL CALCULATED.3IONS-SCNC: 11 MMOL/L (ref 3–16)
ANION GAP SERPL CALCULATED.3IONS-SCNC: 13 MMOL/L (ref 3–16)
ANION GAP SERPL CALCULATED.3IONS-SCNC: 13 MMOL/L (ref 3–16)
ANION GAP SERPL CALCULATED.3IONS-SCNC: 14 MMOL/L (ref 3–16)
ANION GAP SERPL CALCULATED.3IONS-SCNC: 19 MMOL/L (ref 3–16)
ANION GAP SERPL CALCULATED.3IONS-SCNC: 20 MMOL/L (ref 3–16)
ANION GAP SERPL CALCULATED.3IONS-SCNC: 8 MMOL/L (ref 3–16)
ANION GAP SERPL CALCULATED.3IONS-SCNC: 9 MMOL/L (ref 3–16)
ANTI-XA UNFRAC HEPARIN: 0.38 IU/ML (ref 0.3–0.7)
ANTI-XA UNFRAC HEPARIN: 0.62 IU/ML (ref 0.3–0.7)
ANTI-XA UNFRAC HEPARIN: 0.66 IU/ML (ref 0.3–0.7)
APTT: 33.6 SEC (ref 24.2–36.2)
APTT: 37 SEC (ref 24.2–36.2)
AST SERPL-CCNC: 129 U/L (ref 15–37)
AST SERPL-CCNC: 129 U/L (ref 15–37)
AST SERPL-CCNC: 150 U/L (ref 15–37)
AST SERPL-CCNC: 204 U/L (ref 15–37)
AST SERPL-CCNC: 230 U/L (ref 15–37)
BACTERIA: ABNORMAL /HPF
BARBITURATE SCREEN URINE: NORMAL
BASE EXCESS ARTERIAL: -14.3 MMOL/L (ref -3–3)
BASE EXCESS ARTERIAL: 5.3 MMOL/L (ref -3–3)
BASE EXCESS VENOUS: -3.4 MMOL/L (ref -2–3)
BASE EXCESS VENOUS: -3.9 MMOL/L
BASE EXCESS VENOUS: 6.3 MMOL/L (ref -3–3)
BASOPHILS ABSOLUTE: 0 K/UL (ref 0–0.2)
BASOPHILS ABSOLUTE: 0.1 K/UL (ref 0–0.2)
BASOPHILS ABSOLUTE: 0.1 K/UL (ref 0–0.2)
BASOPHILS RELATIVE PERCENT: 0.1 %
BASOPHILS RELATIVE PERCENT: 0.4 %
BASOPHILS RELATIVE PERCENT: 0.5 %
BASOPHILS RELATIVE PERCENT: 0.8 %
BENZODIAZEPINE SCREEN, URINE: NORMAL
BILIRUB SERPL-MCNC: 0.6 MG/DL (ref 0–1)
BILIRUB SERPL-MCNC: 0.9 MG/DL (ref 0–1)
BILIRUB SERPL-MCNC: 1.2 MG/DL (ref 0–1)
BILIRUB SERPL-MCNC: 2.1 MG/DL (ref 0–1)
BILIRUB SERPL-MCNC: 3.1 MG/DL (ref 0–1)
BILIRUBIN DIRECT: 0.5 MG/DL (ref 0–0.3)
BILIRUBIN DIRECT: 1 MG/DL (ref 0–0.3)
BILIRUBIN DIRECT: 2.1 MG/DL (ref 0–0.3)
BILIRUBIN URINE: NEGATIVE
BILIRUBIN URINE: NEGATIVE
BILIRUBIN, INDIRECT: 0.4 MG/DL (ref 0–1)
BILIRUBIN, INDIRECT: 1 MG/DL (ref 0–1)
BILIRUBIN, INDIRECT: 1.1 MG/DL (ref 0–1)
BLOOD CULTURE, ROUTINE: NORMAL
BLOOD CULTURE, ROUTINE: NORMAL
BLOOD, URINE: ABNORMAL
BLOOD, URINE: ABNORMAL
BUN BLDV-MCNC: 12 MG/DL (ref 7–20)
BUN BLDV-MCNC: 14 MG/DL (ref 7–20)
BUN BLDV-MCNC: 15 MG/DL (ref 7–20)
BUN BLDV-MCNC: 20 MG/DL (ref 7–20)
BUN BLDV-MCNC: 21 MG/DL (ref 7–20)
BUN BLDV-MCNC: 22 MG/DL (ref 7–20)
BUN BLDV-MCNC: 25 MG/DL (ref 7–20)
BUN BLDV-MCNC: 28 MG/DL (ref 7–20)
BUN BLDV-MCNC: 30 MG/DL (ref 7–20)
BUN BLDV-MCNC: 6 MG/DL (ref 7–20)
BUN BLDV-MCNC: 7 MG/DL (ref 7–20)
BUN BLDV-MCNC: 8 MG/DL (ref 7–20)
CALCIUM SERPL-MCNC: 7.1 MG/DL (ref 8.3–10.6)
CALCIUM SERPL-MCNC: 7.5 MG/DL (ref 8.3–10.6)
CALCIUM SERPL-MCNC: 7.5 MG/DL (ref 8.3–10.6)
CALCIUM SERPL-MCNC: 7.6 MG/DL (ref 8.3–10.6)
CALCIUM SERPL-MCNC: 7.7 MG/DL (ref 8.3–10.6)
CALCIUM SERPL-MCNC: 7.8 MG/DL (ref 8.3–10.6)
CALCIUM SERPL-MCNC: 8.1 MG/DL (ref 8.3–10.6)
CALCIUM SERPL-MCNC: 8.1 MG/DL (ref 8.3–10.6)
CALCIUM SERPL-MCNC: 8.2 MG/DL (ref 8.3–10.6)
CALCIUM SERPL-MCNC: 9.4 MG/DL (ref 8.3–10.6)
CANNABINOID SCREEN URINE: NORMAL
CARBOXYHEMOGLOBIN ARTERIAL: 1.7 % (ref 0–1.5)
CARBOXYHEMOGLOBIN ARTERIAL: 2.2 % (ref 0–1.5)
CARBOXYHEMOGLOBIN: 1.3 % (ref 0–1.5)
CARBOXYHEMOGLOBIN: 2.1 %
CARBOXYHEMOGLOBIN: 6.5 % (ref 0–1.5)
CHLORIDE BLD-SCNC: 101 MMOL/L (ref 99–110)
CHLORIDE BLD-SCNC: 101 MMOL/L (ref 99–110)
CHLORIDE BLD-SCNC: 102 MMOL/L (ref 99–110)
CHLORIDE BLD-SCNC: 103 MMOL/L (ref 99–110)
CHLORIDE BLD-SCNC: 104 MMOL/L (ref 99–110)
CHLORIDE BLD-SCNC: 106 MMOL/L (ref 99–110)
CHLORIDE BLD-SCNC: 107 MMOL/L (ref 99–110)
CHLORIDE BLD-SCNC: 108 MMOL/L (ref 99–110)
CHLORIDE BLD-SCNC: 108 MMOL/L (ref 99–110)
CHLORIDE BLD-SCNC: 109 MMOL/L (ref 99–110)
CLARITY: ABNORMAL
CLARITY: CLEAR
CO2: 17 MMOL/L (ref 21–32)
CO2: 21 MMOL/L (ref 21–32)
CO2: 21 MMOL/L (ref 21–32)
CO2: 22 MMOL/L (ref 21–32)
CO2: 23 MMOL/L (ref 21–32)
CO2: 23 MMOL/L (ref 21–32)
CO2: 24 MMOL/L (ref 21–32)
CO2: 24 MMOL/L (ref 21–32)
CO2: 25 MMOL/L (ref 21–32)
CO2: 26 MMOL/L (ref 21–32)
CO2: 27 MMOL/L (ref 21–32)
CO2: 27 MMOL/L (ref 21–32)
COCAINE METABOLITE SCREEN URINE: NORMAL
COLOR: ABNORMAL
COLOR: YELLOW
COMMENT UA: ABNORMAL
CREAT SERPL-MCNC: 0.6 MG/DL (ref 0.8–1.3)
CREAT SERPL-MCNC: 0.7 MG/DL (ref 0.8–1.3)
CREAT SERPL-MCNC: 0.8 MG/DL (ref 0.8–1.3)
CREAT SERPL-MCNC: 1.2 MG/DL (ref 0.8–1.3)
CREAT SERPL-MCNC: 1.2 MG/DL (ref 0.8–1.3)
CREAT SERPL-MCNC: 1.5 MG/DL (ref 0.8–1.3)
CREAT SERPL-MCNC: 1.6 MG/DL (ref 0.8–1.3)
CREAT SERPL-MCNC: 1.6 MG/DL (ref 0.8–1.3)
CREATININE URINE: 66.6 MG/DL (ref 39–259)
CRYSTALS, UA: ABNORMAL /HPF
CULTURE, BLOOD 2: NORMAL
CULTURE, BLOOD 2: NORMAL
D DIMER: 298 NG/ML DDU (ref 0–229)
D DIMER: 548 NG/ML DDU (ref 0–229)
EKG ATRIAL RATE: 120 BPM
EKG ATRIAL RATE: 66 BPM
EKG ATRIAL RATE: 77 BPM
EKG DIAGNOSIS: NORMAL
EKG Q-T INTERVAL: 356 MS
EKG Q-T INTERVAL: 386 MS
EKG Q-T INTERVAL: 432 MS
EKG Q-T INTERVAL: 556 MS
EKG QRS DURATION: 72 MS
EKG QRS DURATION: 82 MS
EKG QRS DURATION: 88 MS
EKG QRS DURATION: 96 MS
EKG QTC CALCULATION (BAZETT): 452 MS
EKG QTC CALCULATION (BAZETT): 469 MS
EKG QTC CALCULATION (BAZETT): 470 MS
EKG QTC CALCULATION (BAZETT): 595 MS
EKG R AXIS: 56 DEGREES
EKG R AXIS: 57 DEGREES
EKG R AXIS: 70 DEGREES
EKG R AXIS: 99 DEGREES
EKG T AXIS: -54 DEGREES
EKG T AXIS: -62 DEGREES
EKG T AXIS: -72 DEGREES
EKG T AXIS: 265 DEGREES
EKG VENTRICULAR RATE: 105 BPM
EKG VENTRICULAR RATE: 66 BPM
EKG VENTRICULAR RATE: 69 BPM
EKG VENTRICULAR RATE: 89 BPM
EOSINOPHILS ABSOLUTE: 0 K/UL (ref 0–0.6)
EOSINOPHILS ABSOLUTE: 0.1 K/UL (ref 0–0.6)
EOSINOPHILS ABSOLUTE: 0.1 K/UL (ref 0–0.6)
EOSINOPHILS ABSOLUTE: 0.2 K/UL (ref 0–0.6)
EOSINOPHILS ABSOLUTE: 0.2 K/UL (ref 0–0.6)
EOSINOPHILS RELATIVE PERCENT: 0 %
EOSINOPHILS RELATIVE PERCENT: 0.1 %
EOSINOPHILS RELATIVE PERCENT: 0.6 %
EOSINOPHILS RELATIVE PERCENT: 1.4 %
EOSINOPHILS RELATIVE PERCENT: 1.6 %
EOSINOPHILS RELATIVE PERCENT: 3 %
EPITHELIAL CELLS, UA: 4 /HPF (ref 0–5)
EPITHELIAL CELLS, UA: ABNORMAL /HPF (ref 0–5)
ESTIMATED AVERAGE GLUCOSE: 122.6 MG/DL
ETHANOL: NORMAL MG/DL (ref 0–0.08)
GFR AFRICAN AMERICAN: 51
GFR AFRICAN AMERICAN: 51
GFR AFRICAN AMERICAN: 55
GFR AFRICAN AMERICAN: >60
GFR NON-AFRICAN AMERICAN: 42
GFR NON-AFRICAN AMERICAN: 42
GFR NON-AFRICAN AMERICAN: 45
GFR NON-AFRICAN AMERICAN: 59
GFR NON-AFRICAN AMERICAN: 59
GFR NON-AFRICAN AMERICAN: >60
GLOBULIN: 4 G/DL
GLOBULIN: 4.6 G/DL
GLUCOSE BLD-MCNC: 100 MG/DL (ref 70–99)
GLUCOSE BLD-MCNC: 108 MG/DL (ref 70–99)
GLUCOSE BLD-MCNC: 111 MG/DL (ref 70–99)
GLUCOSE BLD-MCNC: 115 MG/DL (ref 70–99)
GLUCOSE BLD-MCNC: 118 MG/DL (ref 70–99)
GLUCOSE BLD-MCNC: 121 MG/DL (ref 70–99)
GLUCOSE BLD-MCNC: 123 MG/DL (ref 70–99)
GLUCOSE BLD-MCNC: 128 MG/DL (ref 70–99)
GLUCOSE BLD-MCNC: 129 MG/DL (ref 70–99)
GLUCOSE BLD-MCNC: 129 MG/DL (ref 70–99)
GLUCOSE BLD-MCNC: 131 MG/DL (ref 70–99)
GLUCOSE BLD-MCNC: 133 MG/DL (ref 70–99)
GLUCOSE BLD-MCNC: 137 MG/DL (ref 70–99)
GLUCOSE BLD-MCNC: 148 MG/DL (ref 70–99)
GLUCOSE BLD-MCNC: 154 MG/DL (ref 70–99)
GLUCOSE BLD-MCNC: 156 MG/DL (ref 70–99)
GLUCOSE BLD-MCNC: 156 MG/DL (ref 70–99)
GLUCOSE BLD-MCNC: 157 MG/DL (ref 70–99)
GLUCOSE BLD-MCNC: 157 MG/DL (ref 70–99)
GLUCOSE BLD-MCNC: 160 MG/DL (ref 70–99)
GLUCOSE BLD-MCNC: 172 MG/DL (ref 70–99)
GLUCOSE BLD-MCNC: 172 MG/DL (ref 70–99)
GLUCOSE BLD-MCNC: 185 MG/DL (ref 70–99)
GLUCOSE BLD-MCNC: 192 MG/DL (ref 70–99)
GLUCOSE BLD-MCNC: 192 MG/DL (ref 70–99)
GLUCOSE BLD-MCNC: 197 MG/DL (ref 70–99)
GLUCOSE BLD-MCNC: 198 MG/DL (ref 70–99)
GLUCOSE BLD-MCNC: 198 MG/DL (ref 70–99)
GLUCOSE BLD-MCNC: 200 MG/DL (ref 70–99)
GLUCOSE BLD-MCNC: 203 MG/DL (ref 70–99)
GLUCOSE BLD-MCNC: 223 MG/DL (ref 70–99)
GLUCOSE BLD-MCNC: 226 MG/DL (ref 70–99)
GLUCOSE BLD-MCNC: 233 MG/DL (ref 70–99)
GLUCOSE BLD-MCNC: 238 MG/DL (ref 70–99)
GLUCOSE BLD-MCNC: 239 MG/DL (ref 70–99)
GLUCOSE BLD-MCNC: 240 MG/DL (ref 70–99)
GLUCOSE BLD-MCNC: 245 MG/DL (ref 70–99)
GLUCOSE BLD-MCNC: 252 MG/DL (ref 70–99)
GLUCOSE BLD-MCNC: 253 MG/DL (ref 70–99)
GLUCOSE BLD-MCNC: 290 MG/DL (ref 70–99)
GLUCOSE BLD-MCNC: 59 MG/DL (ref 70–99)
GLUCOSE BLD-MCNC: 62 MG/DL (ref 70–99)
GLUCOSE BLD-MCNC: 65 MG/DL (ref 70–99)
GLUCOSE BLD-MCNC: 67 MG/DL (ref 70–99)
GLUCOSE BLD-MCNC: 83 MG/DL (ref 70–99)
GLUCOSE BLD-MCNC: 94 MG/DL (ref 70–99)
GLUCOSE BLD-MCNC: 96 MG/DL (ref 70–99)
GLUCOSE URINE: NEGATIVE MG/DL
GLUCOSE URINE: NEGATIVE MG/DL
HBA1C MFR BLD: 5.9 %
HCO3 ARTERIAL: 11.5 MMOL/L (ref 21–29)
HCO3 ARTERIAL: 29.5 MMOL/L (ref 21–29)
HCO3 VENOUS: 21 MMOL/L (ref 23–29)
HCO3 VENOUS: 23.6 MMOL/L (ref 24–28)
HCO3 VENOUS: 28.4 MMOL/L (ref 23–29)
HCT VFR BLD CALC: 25 % (ref 40.5–52.5)
HCT VFR BLD CALC: 25.5 % (ref 40.5–52.5)
HCT VFR BLD CALC: 27.7 % (ref 40.5–52.5)
HCT VFR BLD CALC: 31.6 % (ref 40.5–52.5)
HCT VFR BLD CALC: 32.9 % (ref 40.5–52.5)
HCT VFR BLD CALC: 33.9 % (ref 40.5–52.5)
HCT VFR BLD CALC: 37.4 % (ref 40.5–52.5)
HCT VFR BLD CALC: 37.7 % (ref 40.5–52.5)
HCT VFR BLD CALC: 40.8 % (ref 40.5–52.5)
HCT VFR BLD CALC: 43.2 % (ref 40.5–52.5)
HEMOGLOBIN, ART, EXTENDED: 13.5 G/DL (ref 13.5–17.5)
HEMOGLOBIN, ART, EXTENDED: 9.2 G/DL (ref 13.5–17.5)
HEMOGLOBIN, VEN, REDUCED: 58.9 %
HEMOGLOBIN: 10.3 G/DL (ref 13.5–17.5)
HEMOGLOBIN: 10.5 G/DL (ref 13.5–17.5)
HEMOGLOBIN: 10.8 G/DL (ref 13.5–17.5)
HEMOGLOBIN: 12.1 G/DL (ref 13.5–17.5)
HEMOGLOBIN: 12.5 G/DL (ref 13.5–17.5)
HEMOGLOBIN: 13.3 G/DL (ref 13.5–17.5)
HEMOGLOBIN: 13.9 G/DL (ref 13.5–17.5)
HEMOGLOBIN: 8.2 G/DL (ref 13.5–17.5)
HEMOGLOBIN: 8.5 G/DL (ref 13.5–17.5)
HEMOGLOBIN: 9 G/DL (ref 13.5–17.5)
HYALINE CASTS: 4 /LPF (ref 0–8)
INR BLD: 1.18 (ref 0.86–1.14)
KETONES, URINE: NEGATIVE MG/DL
KETONES, URINE: NEGATIVE MG/DL
LACTIC ACID, SEPSIS: 5.7 MMOL/L (ref 0.4–1.9)
LACTIC ACID, SEPSIS: 6.9 MMOL/L (ref 0.4–1.9)
LACTIC ACID: 1.4 MMOL/L (ref 0.4–2)
LACTIC ACID: 10.9 MMOL/L (ref 0.4–2)
LACTIC ACID: 2.1 MMOL/L (ref 0.4–2)
LACTIC ACID: 2.2 MMOL/L (ref 0.4–2)
LACTIC ACID: 8.6 MMOL/L (ref 0.4–2)
LEUKOCYTE ESTERASE, URINE: ABNORMAL
LEUKOCYTE ESTERASE, URINE: ABNORMAL
LIPASE: 13 U/L (ref 13–60)
LIPASE: 9 U/L (ref 13–60)
LV EF: 55 %
LVEF MODALITY: NORMAL
LYMPHOCYTES ABSOLUTE: 0.7 K/UL (ref 1–5.1)
LYMPHOCYTES ABSOLUTE: 0.7 K/UL (ref 1–5.1)
LYMPHOCYTES ABSOLUTE: 0.8 K/UL (ref 1–5.1)
LYMPHOCYTES ABSOLUTE: 1.2 K/UL (ref 1–5.1)
LYMPHOCYTES ABSOLUTE: 1.2 K/UL (ref 1–5.1)
LYMPHOCYTES ABSOLUTE: 1.4 K/UL (ref 1–5.1)
LYMPHOCYTES ABSOLUTE: 1.5 K/UL (ref 1–5.1)
LYMPHOCYTES ABSOLUTE: 3.1 K/UL (ref 1–5.1)
LYMPHOCYTES RELATIVE PERCENT: 12.4 %
LYMPHOCYTES RELATIVE PERCENT: 12.5 %
LYMPHOCYTES RELATIVE PERCENT: 13.1 %
LYMPHOCYTES RELATIVE PERCENT: 16.2 %
LYMPHOCYTES RELATIVE PERCENT: 19.7 %
LYMPHOCYTES RELATIVE PERCENT: 20.2 %
LYMPHOCYTES RELATIVE PERCENT: 29.2 %
LYMPHOCYTES RELATIVE PERCENT: 4 %
Lab: NORMAL
MAGNESIUM: 1.2 MG/DL (ref 1.8–2.4)
MAGNESIUM: 1.4 MG/DL (ref 1.8–2.4)
MAGNESIUM: 2.1 MG/DL (ref 1.8–2.4)
MCH RBC QN AUTO: 31.6 PG (ref 26–34)
MCH RBC QN AUTO: 31.7 PG (ref 26–34)
MCH RBC QN AUTO: 31.8 PG (ref 26–34)
MCH RBC QN AUTO: 32.3 PG (ref 26–34)
MCH RBC QN AUTO: 33.2 PG (ref 26–34)
MCH RBC QN AUTO: 33.4 PG (ref 26–34)
MCH RBC QN AUTO: 33.5 PG (ref 26–34)
MCH RBC QN AUTO: 33.6 PG (ref 26–34)
MCH RBC QN AUTO: 33.7 PG (ref 26–34)
MCH RBC QN AUTO: 34.6 PG (ref 26–34)
MCHC RBC AUTO-ENTMCNC: 30.8 G/DL (ref 31–36)
MCHC RBC AUTO-ENTMCNC: 32.2 G/DL (ref 31–36)
MCHC RBC AUTO-ENTMCNC: 32.3 G/DL (ref 31–36)
MCHC RBC AUTO-ENTMCNC: 32.6 G/DL (ref 31–36)
MCHC RBC AUTO-ENTMCNC: 32.7 G/DL (ref 31–36)
MCHC RBC AUTO-ENTMCNC: 33 G/DL (ref 31–36)
MCHC RBC AUTO-ENTMCNC: 33.1 G/DL (ref 31–36)
MCHC RBC AUTO-ENTMCNC: 33.5 G/DL (ref 31–36)
MCV RBC AUTO: 100.4 FL (ref 80–100)
MCV RBC AUTO: 100.5 FL (ref 80–100)
MCV RBC AUTO: 101.5 FL (ref 80–100)
MCV RBC AUTO: 101.6 FL (ref 80–100)
MCV RBC AUTO: 102.1 FL (ref 80–100)
MCV RBC AUTO: 105.9 FL (ref 80–100)
MCV RBC AUTO: 108.9 FL (ref 80–100)
MCV RBC AUTO: 95.7 FL (ref 80–100)
MCV RBC AUTO: 97.7 FL (ref 80–100)
MCV RBC AUTO: 97.8 FL (ref 80–100)
METHADONE SCREEN, URINE: NORMAL
METHEMOGLOBIN ARTERIAL: 0 %
METHEMOGLOBIN ARTERIAL: 0.7 %
METHEMOGLOBIN VENOUS: 0 %
METHEMOGLOBIN VENOUS: 0.3 % (ref 0–1.5)
METHEMOGLOBIN VENOUS: 0.6 %
MICROSCOPIC EXAMINATION: YES
MICROSCOPIC EXAMINATION: YES
MONOCYTES ABSOLUTE: 0.1 K/UL (ref 0–1.3)
MONOCYTES ABSOLUTE: 0.3 K/UL (ref 0–1.3)
MONOCYTES ABSOLUTE: 0.5 K/UL (ref 0–1.3)
MONOCYTES ABSOLUTE: 0.6 K/UL (ref 0–1.3)
MONOCYTES ABSOLUTE: 0.7 K/UL (ref 0–1.3)
MONOCYTES ABSOLUTE: 0.8 K/UL (ref 0–1.3)
MONOCYTES RELATIVE PERCENT: 1.3 %
MONOCYTES RELATIVE PERCENT: 3.7 %
MONOCYTES RELATIVE PERCENT: 5.3 %
MONOCYTES RELATIVE PERCENT: 5.5 %
MONOCYTES RELATIVE PERCENT: 6 %
MONOCYTES RELATIVE PERCENT: 6.5 %
MONOCYTES RELATIVE PERCENT: 6.5 %
MONOCYTES RELATIVE PERCENT: 7.3 %
NEUTROPHILS ABSOLUTE: 16.6 K/UL (ref 1.7–7.7)
NEUTROPHILS ABSOLUTE: 3.3 K/UL (ref 1.7–7.7)
NEUTROPHILS ABSOLUTE: 4.3 K/UL (ref 1.7–7.7)
NEUTROPHILS ABSOLUTE: 5.4 K/UL (ref 1.7–7.7)
NEUTROPHILS ABSOLUTE: 6.6 K/UL (ref 1.7–7.7)
NEUTROPHILS ABSOLUTE: 6.7 K/UL (ref 1.7–7.7)
NEUTROPHILS ABSOLUTE: 7.6 K/UL (ref 1.7–7.7)
NEUTROPHILS ABSOLUTE: 7.6 K/UL (ref 1.7–7.7)
NEUTROPHILS RELATIVE PERCENT: 62.1 %
NEUTROPHILS RELATIVE PERCENT: 69.9 %
NEUTROPHILS RELATIVE PERCENT: 76 %
NEUTROPHILS RELATIVE PERCENT: 78.8 %
NEUTROPHILS RELATIVE PERCENT: 79.9 %
NEUTROPHILS RELATIVE PERCENT: 80.9 %
NEUTROPHILS RELATIVE PERCENT: 81.7 %
NEUTROPHILS RELATIVE PERCENT: 91.8 %
NITRITE, URINE: NEGATIVE
NITRITE, URINE: POSITIVE
O2 CONTENT ARTERIAL: 12 ML/DL
O2 CONTENT ARTERIAL: 17 ML/DL
O2 CONTENT, VEN: 18 VOL %
O2 CONTENT, VEN: 6 ML/DL
O2 SAT, ARTERIAL: 90.7 %
O2 SAT, ARTERIAL: 91.3 %
O2 SAT, VEN: 100 %
O2 SAT, VEN: 40 %
O2 SAT, VEN: 43 %
O2 THERAPY: ABNORMAL
OPIATE SCREEN URINE: NORMAL
ORGANISM: ABNORMAL
ORGANISM: ABNORMAL
OSMOLALITY URINE: 229 MOSM/KG (ref 390–1070)
OXYCODONE URINE: NORMAL
PCO2 ARTERIAL: 25.9 MMHG (ref 35–45)
PCO2 ARTERIAL: 40.7 MMHG (ref 35–45)
PCO2, VEN: 32.1 MMHG (ref 40–50)
PCO2, VEN: 35.7 MMHG (ref 40–50)
PCO2, VEN: 49 MMHG (ref 41–51)
PDW BLD-RTO: 17.4 % (ref 12.4–15.4)
PDW BLD-RTO: 17.5 % (ref 12.4–15.4)
PDW BLD-RTO: 17.8 % (ref 12.4–15.4)
PDW BLD-RTO: 18.1 % (ref 12.4–15.4)
PDW BLD-RTO: 18.7 % (ref 12.4–15.4)
PDW BLD-RTO: 18.7 % (ref 12.4–15.4)
PDW BLD-RTO: 18.8 % (ref 12.4–15.4)
PDW BLD-RTO: 19 % (ref 12.4–15.4)
PDW BLD-RTO: 20.4 % (ref 12.4–15.4)
PDW BLD-RTO: 22.6 % (ref 12.4–15.4)
PERFORMED ON: ABNORMAL
PERFORMED ON: NORMAL
PH ARTERIAL: 7.25 (ref 7.35–7.45)
PH ARTERIAL: 7.47 (ref 7.35–7.45)
PH UA: 6
PH UA: 6 (ref 5–8)
PH UA: 6.5 (ref 5–8)
PH VENOUS: 7.29 (ref 7.35–7.45)
PH VENOUS: 7.38 (ref 7.35–7.45)
PH VENOUS: 7.55 (ref 7.35–7.45)
PHENCYCLIDINE SCREEN URINE: NORMAL
PHOSPHORUS: 2.2 MG/DL (ref 2.5–4.9)
PHOSPHORUS: 2.9 MG/DL (ref 2.5–4.9)
PHOSPHORUS: 4.4 MG/DL (ref 2.5–4.9)
PLATELET # BLD: 113 K/UL (ref 135–450)
PLATELET # BLD: 116 K/UL (ref 135–450)
PLATELET # BLD: 117 K/UL (ref 135–450)
PLATELET # BLD: 133 K/UL (ref 135–450)
PLATELET # BLD: 147 K/UL (ref 135–450)
PLATELET # BLD: 157 K/UL (ref 135–450)
PLATELET # BLD: 186 K/UL (ref 135–450)
PLATELET # BLD: 192 K/UL (ref 135–450)
PLATELET # BLD: 225 K/UL (ref 135–450)
PLATELET # BLD: 261 K/UL (ref 135–450)
PLATELET SLIDE REVIEW: ADEQUATE
PMV BLD AUTO: 8.6 FL (ref 5–10.5)
PMV BLD AUTO: 8.9 FL (ref 5–10.5)
PMV BLD AUTO: 9 FL (ref 5–10.5)
PMV BLD AUTO: 9.1 FL (ref 5–10.5)
PMV BLD AUTO: 9.3 FL (ref 5–10.5)
PMV BLD AUTO: 9.4 FL (ref 5–10.5)
PMV BLD AUTO: 9.5 FL (ref 5–10.5)
PMV BLD AUTO: 9.6 FL (ref 5–10.5)
PO2 ARTERIAL: 57 MMHG (ref 75–108)
PO2 ARTERIAL: 73.7 MMHG (ref 75–108)
PO2, VEN: 141 MMHG (ref 25–40)
PO2, VEN: 29 MMHG
PO2, VEN: <30 MMHG (ref 25–40)
POTASSIUM REFLEX MAGNESIUM: 2.3 MMOL/L (ref 3.5–5.1)
POTASSIUM REFLEX MAGNESIUM: 3.3 MMOL/L (ref 3.5–5.1)
POTASSIUM REFLEX MAGNESIUM: 3.7 MMOL/L (ref 3.5–5.1)
POTASSIUM REFLEX MAGNESIUM: 4.2 MMOL/L (ref 3.5–5.1)
POTASSIUM REFLEX MAGNESIUM: 4.7 MMOL/L (ref 3.5–5.1)
POTASSIUM REFLEX MAGNESIUM: 5.3 MMOL/L (ref 3.5–5.1)
POTASSIUM SERPL-SCNC: 2.3 MMOL/L (ref 3.5–5.1)
POTASSIUM SERPL-SCNC: 3.1 MMOL/L (ref 3.5–5.1)
POTASSIUM SERPL-SCNC: 3.2 MMOL/L (ref 3.5–5.1)
POTASSIUM SERPL-SCNC: 3.7 MMOL/L (ref 3.5–5.1)
POTASSIUM SERPL-SCNC: 3.9 MMOL/L (ref 3.5–5.1)
POTASSIUM SERPL-SCNC: 4 MMOL/L (ref 3.5–5.1)
POTASSIUM SERPL-SCNC: 4.7 MMOL/L (ref 3.5–5.1)
POTASSIUM SERPL-SCNC: 4.7 MMOL/L (ref 3.5–5.1)
PRO-BNP: 3498 PG/ML (ref 0–449)
PRO-BNP: ABNORMAL PG/ML (ref 0–449)
PRO-BNP: ABNORMAL PG/ML (ref 0–449)
PROCALCITONIN: 0.12 NG/ML (ref 0–0.15)
PROCALCITONIN: 0.17 NG/ML (ref 0–0.15)
PROCALCITONIN: 0.28 NG/ML (ref 0–0.15)
PROPOXYPHENE SCREEN: NORMAL
PROTEIN UA: ABNORMAL MG/DL
PROTEIN UA: ABNORMAL MG/DL
PROTHROMBIN TIME: 13.7 SEC (ref 10–13.2)
RBC # BLD: 2.46 M/UL (ref 4.2–5.9)
RBC # BLD: 2.54 M/UL (ref 4.2–5.9)
RBC # BLD: 2.71 M/UL (ref 4.2–5.9)
RBC # BLD: 2.98 M/UL (ref 4.2–5.9)
RBC # BLD: 3.12 M/UL (ref 4.2–5.9)
RBC # BLD: 3.24 M/UL (ref 4.2–5.9)
RBC # BLD: 3.82 M/UL (ref 4.2–5.9)
RBC # BLD: 3.94 M/UL (ref 4.2–5.9)
RBC # BLD: 4.17 M/UL (ref 4.2–5.9)
RBC # BLD: 4.3 M/UL (ref 4.2–5.9)
RBC UA: 150 /HPF (ref 0–4)
RBC UA: ABNORMAL /HPF (ref 0–4)
SALICYLATE, SERUM: 0.4 MG/DL (ref 15–30)
SARS-COV-2, NAAT: NOT DETECTED
SARS-COV-2, PCR: NOT DETECTED
SARS-COV-2: NOT DETECTED
SODIUM BLD-SCNC: 136 MMOL/L (ref 136–145)
SODIUM BLD-SCNC: 137 MMOL/L (ref 136–145)
SODIUM BLD-SCNC: 138 MMOL/L (ref 136–145)
SODIUM BLD-SCNC: 138 MMOL/L (ref 136–145)
SODIUM BLD-SCNC: 139 MMOL/L (ref 136–145)
SODIUM BLD-SCNC: 140 MMOL/L (ref 136–145)
SODIUM BLD-SCNC: 140 MMOL/L (ref 136–145)
SODIUM BLD-SCNC: 141 MMOL/L (ref 136–145)
SODIUM BLD-SCNC: 142 MMOL/L (ref 136–145)
SODIUM BLD-SCNC: 143 MMOL/L (ref 136–145)
SODIUM BLD-SCNC: 144 MMOL/L (ref 136–145)
SODIUM BLD-SCNC: 150 MMOL/L (ref 136–145)
SODIUM URINE: <20 MMOL/L
SPECIFIC GRAVITY UA: 1.01 (ref 1–1.03)
SPECIFIC GRAVITY UA: 1.02 (ref 1–1.03)
T4 FREE: 1.1 NG/DL (ref 0.9–1.8)
TCO2 ARTERIAL: 12.3 MMOL/L
TCO2 ARTERIAL: 68.9 MMOL/L
TCO2 CALC VENOUS: 22 MMOL/L
TCO2 CALC VENOUS: 25 MMOL/L
TCO2 CALC VENOUS: 66 MMOL/L
TOTAL CK: 55 U/L (ref 39–308)
TOTAL PROTEIN: 5.3 G/DL (ref 6.4–8.2)
TOTAL PROTEIN: 5.9 G/DL (ref 6.4–8.2)
TOTAL PROTEIN: 6.5 G/DL (ref 6.4–8.2)
TOTAL PROTEIN: 6.8 G/DL (ref 6.4–8.2)
TOTAL PROTEIN: 7.1 G/DL (ref 6.4–8.2)
TROPONIN: 0.03 NG/ML
TROPONIN: 0.04 NG/ML
TROPONIN: 0.05 NG/ML
TROPONIN: 0.08 NG/ML
TROPONIN: 0.08 NG/ML
TROPONIN: 0.22 NG/ML
TSH REFLEX: 6.17 UIU/ML (ref 0.27–4.2)
URINE CULTURE, ROUTINE: ABNORMAL
URINE CULTURE, ROUTINE: ABNORMAL
URINE CULTURE, ROUTINE: NORMAL
URINE REFLEX TO CULTURE: YES
URINE TYPE: ABNORMAL
URINE TYPE: ABNORMAL
UROBILINOGEN, URINE: 0.2 E.U./DL
UROBILINOGEN, URINE: 1 E.U./DL
WBC # BLD: 10.2 K/UL (ref 4–11)
WBC # BLD: 10.8 K/UL (ref 4–11)
WBC # BLD: 18 K/UL (ref 4–11)
WBC # BLD: 4.2 K/UL (ref 4–11)
WBC # BLD: 5.3 K/UL (ref 4–11)
WBC # BLD: 7.7 K/UL (ref 4–11)
WBC # BLD: 8.1 K/UL (ref 4–11)
WBC # BLD: 8.7 K/UL (ref 4–11)
WBC # BLD: 9.4 K/UL (ref 4–11)
WBC # BLD: 9.5 K/UL (ref 4–11)
WBC UA: 12 /HPF (ref 0–5)
WBC UA: ABNORMAL /HPF (ref 0–5)
YEAST: PRESENT /HPF
YEAST: PRESENT /HPF

## 2021-01-01 PROCEDURE — 96372 THER/PROPH/DIAG INJ SC/IM: CPT

## 2021-01-01 PROCEDURE — 6370000000 HC RX 637 (ALT 250 FOR IP): Performed by: INTERNAL MEDICINE

## 2021-01-01 PROCEDURE — 85520 HEPARIN ASSAY: CPT

## 2021-01-01 PROCEDURE — 80048 BASIC METABOLIC PNL TOTAL CA: CPT

## 2021-01-01 PROCEDURE — 85025 COMPLETE CBC W/AUTO DIFF WBC: CPT

## 2021-01-01 PROCEDURE — 51701 INSERT BLADDER CATHETER: CPT

## 2021-01-01 PROCEDURE — 6370000000 HC RX 637 (ALT 250 FOR IP): Performed by: PHYSICIAN ASSISTANT

## 2021-01-01 PROCEDURE — 94664 DEMO&/EVAL PT USE INHALER: CPT

## 2021-01-01 PROCEDURE — 99223 1ST HOSP IP/OBS HIGH 75: CPT | Performed by: INTERNAL MEDICINE

## 2021-01-01 PROCEDURE — 71045 X-RAY EXAM CHEST 1 VIEW: CPT

## 2021-01-01 PROCEDURE — 80069 RENAL FUNCTION PANEL: CPT

## 2021-01-01 PROCEDURE — U0005 INFEC AGEN DETEC AMPLI PROBE: HCPCS

## 2021-01-01 PROCEDURE — 94761 N-INVAS EAR/PLS OXIMETRY MLT: CPT

## 2021-01-01 PROCEDURE — 82140 ASSAY OF AMMONIA: CPT

## 2021-01-01 PROCEDURE — 2580000003 HC RX 258: Performed by: INTERNAL MEDICINE

## 2021-01-01 PROCEDURE — 0BH17EZ INSERTION OF ENDOTRACHEAL AIRWAY INTO TRACHEA, VIA NATURAL OR ARTIFICIAL OPENING: ICD-10-PCS | Performed by: EMERGENCY MEDICINE

## 2021-01-01 PROCEDURE — 6360000002 HC RX W HCPCS: Performed by: PHYSICIAN ASSISTANT

## 2021-01-01 PROCEDURE — 36415 COLL VENOUS BLD VENIPUNCTURE: CPT

## 2021-01-01 PROCEDURE — 83880 ASSAY OF NATRIURETIC PEPTIDE: CPT

## 2021-01-01 PROCEDURE — 97535 SELF CARE MNGMENT TRAINING: CPT

## 2021-01-01 PROCEDURE — 06HY33Z INSERTION OF INFUSION DEVICE INTO LOWER VEIN, PERCUTANEOUS APPROACH: ICD-10-PCS | Performed by: EMERGENCY MEDICINE

## 2021-01-01 PROCEDURE — 2700000000 HC OXYGEN THERAPY PER DAY

## 2021-01-01 PROCEDURE — 87086 URINE CULTURE/COLONY COUNT: CPT

## 2021-01-01 PROCEDURE — U0003 INFECTIOUS AGENT DETECTION BY NUCLEIC ACID (DNA OR RNA); SEVERE ACUTE RESPIRATORY SYNDROME CORONAVIRUS 2 (SARS-COV-2) (CORONAVIRUS DISEASE [COVID-19]), AMPLIFIED PROBE TECHNIQUE, MAKING USE OF HIGH THROUGHPUT TECHNOLOGIES AS DESCRIBED BY CMS-2020-01-R: HCPCS

## 2021-01-01 PROCEDURE — 99283 EMERGENCY DEPT VISIT LOW MDM: CPT

## 2021-01-01 PROCEDURE — 2580000003 HC RX 258: Performed by: PHYSICIAN ASSISTANT

## 2021-01-01 PROCEDURE — 2500000003 HC RX 250 WO HCPCS: Performed by: EMERGENCY MEDICINE

## 2021-01-01 PROCEDURE — 82803 BLOOD GASES ANY COMBINATION: CPT

## 2021-01-01 PROCEDURE — 84484 ASSAY OF TROPONIN QUANT: CPT

## 2021-01-01 PROCEDURE — 80076 HEPATIC FUNCTION PANEL: CPT

## 2021-01-01 PROCEDURE — 5A1935Z RESPIRATORY VENTILATION, LESS THAN 24 CONSECUTIVE HOURS: ICD-10-PCS | Performed by: EMERGENCY MEDICINE

## 2021-01-01 PROCEDURE — 83690 ASSAY OF LIPASE: CPT

## 2021-01-01 PROCEDURE — 83935 ASSAY OF URINE OSMOLALITY: CPT

## 2021-01-01 PROCEDURE — 96375 TX/PRO/DX INJ NEW DRUG ADDON: CPT

## 2021-01-01 PROCEDURE — 93005 ELECTROCARDIOGRAM TRACING: CPT | Performed by: PHYSICIAN ASSISTANT

## 2021-01-01 PROCEDURE — 6360000002 HC RX W HCPCS: Performed by: INTERNAL MEDICINE

## 2021-01-01 PROCEDURE — 2580000003 HC RX 258: Performed by: EMERGENCY MEDICINE

## 2021-01-01 PROCEDURE — 71260 CT THORAX DX C+: CPT

## 2021-01-01 PROCEDURE — 6360000002 HC RX W HCPCS: Performed by: STUDENT IN AN ORGANIZED HEALTH CARE EDUCATION/TRAINING PROGRAM

## 2021-01-01 PROCEDURE — 2060000000 HC ICU INTERMEDIATE R&B

## 2021-01-01 PROCEDURE — 94640 AIRWAY INHALATION TREATMENT: CPT

## 2021-01-01 PROCEDURE — 36600 WITHDRAWAL OF ARTERIAL BLOOD: CPT

## 2021-01-01 PROCEDURE — 96365 THER/PROPH/DIAG IV INF INIT: CPT

## 2021-01-01 PROCEDURE — 97530 THERAPEUTIC ACTIVITIES: CPT

## 2021-01-01 PROCEDURE — 93010 ELECTROCARDIOGRAM REPORT: CPT | Performed by: INTERNAL MEDICINE

## 2021-01-01 PROCEDURE — G8484 FLU IMMUNIZE NO ADMIN: HCPCS | Performed by: INTERNAL MEDICINE

## 2021-01-01 PROCEDURE — 51702 INSERT TEMP BLADDER CATH: CPT

## 2021-01-01 PROCEDURE — 83605 ASSAY OF LACTIC ACID: CPT

## 2021-01-01 PROCEDURE — 6370000000 HC RX 637 (ALT 250 FOR IP)

## 2021-01-01 PROCEDURE — 94002 VENT MGMT INPAT INIT DAY: CPT

## 2021-01-01 PROCEDURE — 85379 FIBRIN DEGRADATION QUANT: CPT

## 2021-01-01 PROCEDURE — 93005 ELECTROCARDIOGRAM TRACING: CPT | Performed by: STUDENT IN AN ORGANIZED HEALTH CARE EDUCATION/TRAINING PROGRAM

## 2021-01-01 PROCEDURE — 97162 PT EVAL MOD COMPLEX 30 MIN: CPT

## 2021-01-01 PROCEDURE — 84132 ASSAY OF SERUM POTASSIUM: CPT

## 2021-01-01 PROCEDURE — 87040 BLOOD CULTURE FOR BACTERIA: CPT

## 2021-01-01 PROCEDURE — 6360000004 HC RX CONTRAST MEDICATION: Performed by: INTERNAL MEDICINE

## 2021-01-01 PROCEDURE — 6360000004 HC RX CONTRAST MEDICATION: Performed by: PHYSICIAN ASSISTANT

## 2021-01-01 PROCEDURE — 99285 EMERGENCY DEPT VISIT HI MDM: CPT

## 2021-01-01 PROCEDURE — 74177 CT ABD & PELVIS W/CONTRAST: CPT

## 2021-01-01 PROCEDURE — 80053 COMPREHEN METABOLIC PANEL: CPT

## 2021-01-01 PROCEDURE — 96368 THER/DIAG CONCURRENT INF: CPT

## 2021-01-01 PROCEDURE — 51798 US URINE CAPACITY MEASURE: CPT

## 2021-01-01 PROCEDURE — 84145 PROCALCITONIN (PCT): CPT

## 2021-01-01 PROCEDURE — C8929 TTE W OR WO FOL WCON,DOPPLER: HCPCS

## 2021-01-01 PROCEDURE — 93005 ELECTROCARDIOGRAM TRACING: CPT | Performed by: EMERGENCY MEDICINE

## 2021-01-01 PROCEDURE — 82077 ASSAY SPEC XCP UR&BREATH IA: CPT

## 2021-01-01 PROCEDURE — 87635 SARS-COV-2 COVID-19 AMP PRB: CPT

## 2021-01-01 PROCEDURE — 83735 ASSAY OF MAGNESIUM: CPT

## 2021-01-01 PROCEDURE — 1200000000 HC SEMI PRIVATE

## 2021-01-01 PROCEDURE — 80307 DRUG TEST PRSMV CHEM ANLYZR: CPT

## 2021-01-01 PROCEDURE — 70450 CT HEAD/BRAIN W/O DYE: CPT

## 2021-01-01 PROCEDURE — 84443 ASSAY THYROID STIM HORMONE: CPT

## 2021-01-01 PROCEDURE — 6360000002 HC RX W HCPCS: Performed by: EMERGENCY MEDICINE

## 2021-01-01 PROCEDURE — 99284 EMERGENCY DEPT VISIT MOD MDM: CPT

## 2021-01-01 PROCEDURE — 81001 URINALYSIS AUTO W/SCOPE: CPT

## 2021-01-01 PROCEDURE — 3023F SPIROM DOC REV: CPT | Performed by: INTERNAL MEDICINE

## 2021-01-01 PROCEDURE — 2500000003 HC RX 250 WO HCPCS: Performed by: STUDENT IN AN ORGANIZED HEALTH CARE EDUCATION/TRAINING PROGRAM

## 2021-01-01 PROCEDURE — 97166 OT EVAL MOD COMPLEX 45 MIN: CPT

## 2021-01-01 PROCEDURE — 2580000003 HC RX 258: Performed by: STUDENT IN AN ORGANIZED HEALTH CARE EDUCATION/TRAINING PROGRAM

## 2021-01-01 PROCEDURE — 80179 DRUG ASSAY SALICYLATE: CPT

## 2021-01-01 PROCEDURE — 99233 SBSQ HOSP IP/OBS HIGH 50: CPT | Performed by: INTERNAL MEDICINE

## 2021-01-01 PROCEDURE — 84300 ASSAY OF URINE SODIUM: CPT

## 2021-01-01 PROCEDURE — 82565 ASSAY OF CREATININE: CPT

## 2021-01-01 PROCEDURE — 84439 ASSAY OF FREE THYROXINE: CPT

## 2021-01-01 PROCEDURE — 82570 ASSAY OF URINE CREATININE: CPT

## 2021-01-01 PROCEDURE — 99204 OFFICE O/P NEW MOD 45 MIN: CPT | Performed by: INTERNAL MEDICINE

## 2021-01-01 PROCEDURE — 96367 TX/PROPH/DG ADDL SEQ IV INF: CPT

## 2021-01-01 PROCEDURE — 80143 DRUG ASSAY ACETAMINOPHEN: CPT

## 2021-01-01 PROCEDURE — 85610 PROTHROMBIN TIME: CPT

## 2021-01-01 PROCEDURE — 82550 ASSAY OF CK (CPK): CPT

## 2021-01-01 PROCEDURE — G8427 DOCREV CUR MEDS BY ELIG CLIN: HCPCS | Performed by: INTERNAL MEDICINE

## 2021-01-01 PROCEDURE — 31500 INSERT EMERGENCY AIRWAY: CPT

## 2021-01-01 PROCEDURE — G8926 SPIRO NO PERF OR DOC: HCPCS | Performed by: INTERNAL MEDICINE

## 2021-01-01 PROCEDURE — 83036 HEMOGLOBIN GLYCOSYLATED A1C: CPT

## 2021-01-01 PROCEDURE — 6370000000 HC RX 637 (ALT 250 FOR IP): Performed by: STUDENT IN AN ORGANIZED HEALTH CARE EDUCATION/TRAINING PROGRAM

## 2021-01-01 PROCEDURE — 2500000003 HC RX 250 WO HCPCS: Performed by: PHYSICIAN ASSISTANT

## 2021-01-01 PROCEDURE — 2500000003 HC RX 250 WO HCPCS

## 2021-01-01 PROCEDURE — 85730 THROMBOPLASTIN TIME PARTIAL: CPT

## 2021-01-01 PROCEDURE — G8419 CALC BMI OUT NRM PARAM NOF/U: HCPCS | Performed by: INTERNAL MEDICINE

## 2021-01-01 PROCEDURE — 85027 COMPLETE CBC AUTOMATED: CPT

## 2021-01-01 PROCEDURE — 97116 GAIT TRAINING THERAPY: CPT

## 2021-01-01 RX ORDER — HEPARIN SODIUM 1000 [USP'U]/ML
80 INJECTION, SOLUTION INTRAVENOUS; SUBCUTANEOUS ONCE
Status: DISCONTINUED | OUTPATIENT
Start: 2021-01-01 | End: 2021-01-01 | Stop reason: CLARIF

## 2021-01-01 RX ORDER — DEXTROSE MONOHYDRATE 50 MG/ML
100 INJECTION, SOLUTION INTRAVENOUS PRN
Status: DISCONTINUED | OUTPATIENT
Start: 2021-01-01 | End: 2021-01-01 | Stop reason: HOSPADM

## 2021-01-01 RX ORDER — ACETAMINOPHEN 325 MG/1
650 TABLET ORAL EVERY 6 HOURS PRN
Status: DISCONTINUED | OUTPATIENT
Start: 2021-01-01 | End: 2021-01-01 | Stop reason: HOSPADM

## 2021-01-01 RX ORDER — EPINEPHRINE 0.1 MG/ML
1 SYRINGE (ML) INJECTION ONCE
Status: COMPLETED | OUTPATIENT
Start: 2021-01-01 | End: 2021-01-01

## 2021-01-01 RX ORDER — IPRATROPIUM BROMIDE AND ALBUTEROL SULFATE 2.5; .5 MG/3ML; MG/3ML
1 SOLUTION RESPIRATORY (INHALATION) ONCE
Status: COMPLETED | OUTPATIENT
Start: 2021-01-01 | End: 2021-01-01

## 2021-01-01 RX ORDER — SODIUM CHLORIDE 0.9 % (FLUSH) 0.9 %
5-40 SYRINGE (ML) INJECTION EVERY 12 HOURS SCHEDULED
Status: DISCONTINUED | OUTPATIENT
Start: 2021-01-01 | End: 2021-01-01 | Stop reason: HOSPADM

## 2021-01-01 RX ORDER — HEPARIN SODIUM 1000 [USP'U]/ML
80 INJECTION, SOLUTION INTRAVENOUS; SUBCUTANEOUS PRN
Status: DISCONTINUED | OUTPATIENT
Start: 2021-01-01 | End: 2021-01-01

## 2021-01-01 RX ORDER — POTASSIUM CHLORIDE 20 MEQ/1
40 TABLET, EXTENDED RELEASE ORAL ONCE
Status: COMPLETED | OUTPATIENT
Start: 2021-01-01 | End: 2021-01-01

## 2021-01-01 RX ORDER — POLYETHYLENE GLYCOL 3350 17 G/17G
17 POWDER, FOR SOLUTION ORAL DAILY PRN
Status: DISCONTINUED | OUTPATIENT
Start: 2021-01-01 | End: 2021-01-01 | Stop reason: HOSPADM

## 2021-01-01 RX ORDER — ALBUTEROL SULFATE 2.5 MG/3ML
2.5 SOLUTION RESPIRATORY (INHALATION)
Status: DISCONTINUED | OUTPATIENT
Start: 2021-01-01 | End: 2021-01-01 | Stop reason: HOSPADM

## 2021-01-01 RX ORDER — DEXTROSE MONOHYDRATE 25 G/50ML
12.5 INJECTION, SOLUTION INTRAVENOUS PRN
Status: DISCONTINUED | OUTPATIENT
Start: 2021-01-01 | End: 2021-01-01 | Stop reason: HOSPADM

## 2021-01-01 RX ORDER — SODIUM CHLORIDE, SODIUM LACTATE, POTASSIUM CHLORIDE, AND CALCIUM CHLORIDE .6; .31; .03; .02 G/100ML; G/100ML; G/100ML; G/100ML
500 INJECTION, SOLUTION INTRAVENOUS ONCE
Status: COMPLETED | OUTPATIENT
Start: 2021-01-01 | End: 2021-01-01

## 2021-01-01 RX ORDER — SODIUM CHLORIDE 0.9 % (FLUSH) 0.9 %
5-40 SYRINGE (ML) INJECTION PRN
Status: DISCONTINUED | OUTPATIENT
Start: 2021-01-01 | End: 2021-01-01 | Stop reason: HOSPADM

## 2021-01-01 RX ORDER — ACETAMINOPHEN 325 MG/1
650 TABLET ORAL EVERY 6 HOURS PRN
Status: CANCELLED | OUTPATIENT
Start: 2021-01-01

## 2021-01-01 RX ORDER — INSULIN LISPRO 100 [IU]/ML
0-6 INJECTION, SOLUTION INTRAVENOUS; SUBCUTANEOUS NIGHTLY
Status: DISCONTINUED | OUTPATIENT
Start: 2021-01-01 | End: 2021-01-01 | Stop reason: HOSPADM

## 2021-01-01 RX ORDER — MAGNESIUM SULFATE IN WATER 40 MG/ML
2000 INJECTION, SOLUTION INTRAVENOUS ONCE
Status: COMPLETED | OUTPATIENT
Start: 2021-01-01 | End: 2021-01-01

## 2021-01-01 RX ORDER — INSULIN LISPRO 100 [IU]/ML
0-12 INJECTION, SOLUTION INTRAVENOUS; SUBCUTANEOUS
Status: DISCONTINUED | OUTPATIENT
Start: 2021-01-01 | End: 2021-01-01 | Stop reason: HOSPADM

## 2021-01-01 RX ORDER — SODIUM CHLORIDE 9 MG/ML
INJECTION, SOLUTION INTRAVENOUS CONTINUOUS
Status: DISCONTINUED | OUTPATIENT
Start: 2021-01-01 | End: 2021-01-01 | Stop reason: HOSPADM

## 2021-01-01 RX ORDER — DEXTROSE MONOHYDRATE 25 G/50ML
25 INJECTION, SOLUTION INTRAVENOUS ONCE
Status: COMPLETED | OUTPATIENT
Start: 2021-01-01 | End: 2021-01-01

## 2021-01-01 RX ORDER — HEPARIN SODIUM 10000 [USP'U]/100ML
18 INJECTION, SOLUTION INTRAVENOUS CONTINUOUS
Status: DISCONTINUED | OUTPATIENT
Start: 2021-01-01 | End: 2021-01-01

## 2021-01-01 RX ORDER — LIDOCAINE HYDROCHLORIDE 20 MG/ML
JELLY TOPICAL PRN
Status: DISCONTINUED | OUTPATIENT
Start: 2021-01-01 | End: 2021-01-01 | Stop reason: HOSPADM

## 2021-01-01 RX ORDER — DEXMEDETOMIDINE HYDROCHLORIDE 4 UG/ML
.2-1.4 INJECTION, SOLUTION INTRAVENOUS CONTINUOUS
Status: DISCONTINUED | OUTPATIENT
Start: 2021-01-01 | End: 2021-01-01 | Stop reason: HOSPADM

## 2021-01-01 RX ORDER — SODIUM CHLORIDE 9 MG/ML
25 INJECTION, SOLUTION INTRAVENOUS PRN
Status: DISCONTINUED | OUTPATIENT
Start: 2021-01-01 | End: 2021-01-01 | Stop reason: SDUPTHER

## 2021-01-01 RX ORDER — FENTANYL CITRATE 50 UG/ML
25 INJECTION, SOLUTION INTRAMUSCULAR; INTRAVENOUS
Status: DISCONTINUED | OUTPATIENT
Start: 2021-01-01 | End: 2021-01-01 | Stop reason: HOSPADM

## 2021-01-01 RX ORDER — AMOXICILLIN AND CLAVULANATE POTASSIUM 875; 125 MG/1; MG/1
1 TABLET, FILM COATED ORAL EVERY 12 HOURS SCHEDULED
Status: COMPLETED | OUTPATIENT
Start: 2021-01-01 | End: 2021-01-01

## 2021-01-01 RX ORDER — 0.9 % SODIUM CHLORIDE 0.9 %
1000 INTRAVENOUS SOLUTION INTRAVENOUS ONCE
Status: COMPLETED | OUTPATIENT
Start: 2021-01-01 | End: 2021-01-01

## 2021-01-01 RX ORDER — DEXAMETHASONE SODIUM PHOSPHATE 10 MG/ML
6 INJECTION, SOLUTION INTRAMUSCULAR; INTRAVENOUS EVERY 24 HOURS
Status: CANCELLED | OUTPATIENT
Start: 2021-05-06 | End: 2021-05-15

## 2021-01-01 RX ORDER — ASPIRIN 325 MG
325 TABLET ORAL DAILY
Status: DISCONTINUED | OUTPATIENT
Start: 2021-01-01 | End: 2021-01-01 | Stop reason: HOSPADM

## 2021-01-01 RX ORDER — SODIUM CHLORIDE 0.9 % (FLUSH) 0.9 %
5-40 SYRINGE (ML) INJECTION PRN
Status: CANCELLED | OUTPATIENT
Start: 2021-01-01

## 2021-01-01 RX ORDER — LEVOTHYROXINE SODIUM 88 UG/1
88 TABLET ORAL DAILY
Qty: 30 TABLET | Refills: 0 | Status: SHIPPED | OUTPATIENT
Start: 2021-01-01

## 2021-01-01 RX ORDER — INSULIN ASPART 100 [IU]/ML
INJECTION, SOLUTION INTRAVENOUS; SUBCUTANEOUS
COMMUNITY

## 2021-01-01 RX ORDER — ESCITALOPRAM OXALATE 10 MG/1
10 TABLET ORAL DAILY
COMMUNITY

## 2021-01-01 RX ORDER — ZOLPIDEM TARTRATE 5 MG/1
5 TABLET ORAL NIGHTLY PRN
Status: DISCONTINUED | OUTPATIENT
Start: 2021-01-01 | End: 2021-01-01 | Stop reason: HOSPADM

## 2021-01-01 RX ORDER — MAGNESIUM SULFATE IN WATER 40 MG/ML
4000 INJECTION, SOLUTION INTRAVENOUS ONCE
Status: DISCONTINUED | OUTPATIENT
Start: 2021-01-01 | End: 2021-01-01 | Stop reason: HOSPADM

## 2021-01-01 RX ORDER — OLANZAPINE 10 MG/1
10 INJECTION, POWDER, LYOPHILIZED, FOR SOLUTION INTRAMUSCULAR
Status: COMPLETED | OUTPATIENT
Start: 2021-01-01 | End: 2021-01-01

## 2021-01-01 RX ORDER — POTASSIUM CHLORIDE 20 MEQ/1
40 TABLET, EXTENDED RELEASE ORAL EVERY 4 HOURS
Status: COMPLETED | OUTPATIENT
Start: 2021-01-01 | End: 2021-01-01

## 2021-01-01 RX ORDER — ACETAMINOPHEN 650 MG/1
650 SUPPOSITORY RECTAL EVERY 6 HOURS PRN
Status: CANCELLED | OUTPATIENT
Start: 2021-01-01

## 2021-01-01 RX ORDER — IPRATROPIUM BROMIDE AND ALBUTEROL SULFATE 2.5; .5 MG/3ML; MG/3ML
1 SOLUTION RESPIRATORY (INHALATION) 3 TIMES DAILY
Status: DISCONTINUED | OUTPATIENT
Start: 2021-01-01 | End: 2021-01-01

## 2021-01-01 RX ORDER — NICOTINE POLACRILEX 4 MG
15 LOZENGE BUCCAL PRN
Status: DISCONTINUED | OUTPATIENT
Start: 2021-01-01 | End: 2021-01-01 | Stop reason: HOSPADM

## 2021-01-01 RX ORDER — FLUTICASONE FUROATE, UMECLIDINIUM BROMIDE AND VILANTEROL TRIFENATATE 100; 62.5; 25 UG/1; UG/1; UG/1
1 POWDER RESPIRATORY (INHALATION) DAILY
COMMUNITY

## 2021-01-01 RX ORDER — ALBUTEROL SULFATE 2.5 MG/3ML
2.5 SOLUTION RESPIRATORY (INHALATION) ONCE
Status: COMPLETED | OUTPATIENT
Start: 2021-01-01 | End: 2021-01-01

## 2021-01-01 RX ORDER — SODIUM CHLORIDE 9 MG/ML
25 INJECTION, SOLUTION INTRAVENOUS PRN
Status: DISCONTINUED | OUTPATIENT
Start: 2021-01-01 | End: 2021-01-01 | Stop reason: HOSPADM

## 2021-01-01 RX ORDER — OLANZAPINE 10 MG/1
INJECTION, POWDER, LYOPHILIZED, FOR SOLUTION INTRAMUSCULAR
Status: COMPLETED
Start: 2021-01-01 | End: 2021-01-01

## 2021-01-01 RX ORDER — PROMETHAZINE HYDROCHLORIDE 25 MG/1
12.5 TABLET ORAL EVERY 6 HOURS PRN
Status: DISCONTINUED | OUTPATIENT
Start: 2021-01-01 | End: 2021-01-01 | Stop reason: HOSPADM

## 2021-01-01 RX ORDER — ASPIRIN 325 MG
325 TABLET ORAL ONCE
Status: COMPLETED | OUTPATIENT
Start: 2021-01-01 | End: 2021-01-01

## 2021-01-01 RX ORDER — ACETAMINOPHEN 650 MG/1
650 SUPPOSITORY RECTAL EVERY 6 HOURS PRN
Status: DISCONTINUED | OUTPATIENT
Start: 2021-01-01 | End: 2021-01-01 | Stop reason: HOSPADM

## 2021-01-01 RX ORDER — SODIUM CHLORIDE 9 MG/ML
INJECTION, SOLUTION INTRAVENOUS CONTINUOUS
Status: DISCONTINUED | OUTPATIENT
Start: 2021-01-01 | End: 2021-01-01

## 2021-01-01 RX ORDER — RISPERIDONE 0.25 MG/1
0.25 TABLET, FILM COATED ORAL DAILY
Status: DISCONTINUED | OUTPATIENT
Start: 2021-01-01 | End: 2021-01-01 | Stop reason: HOSPADM

## 2021-01-01 RX ORDER — ROCURONIUM BROMIDE 10 MG/ML
80 INJECTION, SOLUTION INTRAVENOUS ONCE
Status: COMPLETED | OUTPATIENT
Start: 2021-01-01 | End: 2021-01-01

## 2021-01-01 RX ORDER — ACETAMINOPHEN 325 MG/1
650 TABLET ORAL EVERY 6 HOURS PRN
COMMUNITY

## 2021-01-01 RX ORDER — TAMSULOSIN HYDROCHLORIDE 0.4 MG/1
0.4 CAPSULE ORAL DAILY
Status: DISCONTINUED | OUTPATIENT
Start: 2021-01-01 | End: 2021-01-01 | Stop reason: HOSPADM

## 2021-01-01 RX ORDER — DULOXETIN HYDROCHLORIDE 30 MG/1
60 CAPSULE, DELAYED RELEASE ORAL DAILY
Status: DISCONTINUED | OUTPATIENT
Start: 2021-01-01 | End: 2021-01-01 | Stop reason: HOSPADM

## 2021-01-01 RX ORDER — DEXAMETHASONE SODIUM PHOSPHATE 10 MG/ML
10 INJECTION, SOLUTION INTRAMUSCULAR; INTRAVENOUS ONCE
Status: COMPLETED | OUTPATIENT
Start: 2021-01-01 | End: 2021-01-01

## 2021-01-01 RX ORDER — PREDNISONE 20 MG/1
20 TABLET ORAL 2 TIMES DAILY
Status: DISCONTINUED | OUTPATIENT
Start: 2021-01-01 | End: 2021-01-01 | Stop reason: HOSPADM

## 2021-01-01 RX ORDER — LEVOTHYROXINE SODIUM 88 UG/1
88 TABLET ORAL DAILY
Status: DISCONTINUED | OUTPATIENT
Start: 2021-01-01 | End: 2021-01-01 | Stop reason: HOSPADM

## 2021-01-01 RX ORDER — POTASSIUM CHLORIDE 20 MEQ/1
40 TABLET, EXTENDED RELEASE ORAL 2 TIMES DAILY WITH MEALS
Status: DISCONTINUED | OUTPATIENT
Start: 2021-01-01 | End: 2021-01-01 | Stop reason: HOSPADM

## 2021-01-01 RX ORDER — GUAIFENESIN/DEXTROMETHORPHAN 100-10MG/5
5 SYRUP ORAL EVERY 4 HOURS PRN
Status: CANCELLED | OUTPATIENT
Start: 2021-01-01

## 2021-01-01 RX ORDER — POLYETHYLENE GLYCOL 3350 17 G/17G
17 POWDER, FOR SOLUTION ORAL DAILY PRN
Status: DISCONTINUED | OUTPATIENT
Start: 2021-01-01 | End: 2021-01-01 | Stop reason: SDUPTHER

## 2021-01-01 RX ORDER — ATROPINE SULFATE 0.1 MG/ML
1 INJECTION INTRAVENOUS ONCE
Status: COMPLETED | OUTPATIENT
Start: 2021-01-01 | End: 2021-01-01

## 2021-01-01 RX ORDER — 0.9 % SODIUM CHLORIDE 0.9 %
500 INTRAVENOUS SOLUTION INTRAVENOUS ONCE
Status: COMPLETED | OUTPATIENT
Start: 2021-01-01 | End: 2021-01-01

## 2021-01-01 RX ORDER — RISPERIDONE 0.25 MG/1
0.25 TABLET, FILM COATED ORAL DAILY
Status: ON HOLD | COMMUNITY
End: 2021-01-01 | Stop reason: HOSPADM

## 2021-01-01 RX ORDER — PROMETHAZINE HYDROCHLORIDE 25 MG/1
12.5 TABLET ORAL EVERY 6 HOURS PRN
Status: DISCONTINUED | OUTPATIENT
Start: 2021-01-01 | End: 2021-01-01 | Stop reason: SDUPTHER

## 2021-01-01 RX ORDER — FLUTICASONE PROPIONATE 44 UG/1
1 AEROSOL, METERED RESPIRATORY (INHALATION) 2 TIMES DAILY
Status: DISCONTINUED | OUTPATIENT
Start: 2021-01-01 | End: 2021-01-01 | Stop reason: HOSPADM

## 2021-01-01 RX ORDER — LORAZEPAM 2 MG/ML
0.5 INJECTION INTRAMUSCULAR ONCE
Status: COMPLETED | OUTPATIENT
Start: 2021-01-01 | End: 2021-01-01

## 2021-01-01 RX ORDER — CASTOR OIL AND BALSAM, PERU 788; 87 MG/G; MG/G
OINTMENT TOPICAL 2 TIMES DAILY
Status: DISCONTINUED | OUTPATIENT
Start: 2021-01-01 | End: 2021-01-01 | Stop reason: HOSPADM

## 2021-01-01 RX ORDER — LEVOFLOXACIN 500 MG/1
500 TABLET, FILM COATED ORAL DAILY
Qty: 10 TABLET | Refills: 0 | Status: ON HOLD | OUTPATIENT
Start: 2021-01-01 | End: 2021-01-01 | Stop reason: HOSPADM

## 2021-01-01 RX ORDER — ACETAMINOPHEN 650 MG/1
650 SUPPOSITORY RECTAL EVERY 6 HOURS PRN
Status: DISCONTINUED | OUTPATIENT
Start: 2021-01-01 | End: 2021-01-01 | Stop reason: SDUPTHER

## 2021-01-01 RX ORDER — FLUCONAZOLE 200 MG/1
100 TABLET ORAL DAILY
Status: COMPLETED | OUTPATIENT
Start: 2021-01-01 | End: 2021-01-01

## 2021-01-01 RX ORDER — TRAZODONE HYDROCHLORIDE 50 MG/1
50 TABLET ORAL NIGHTLY PRN
Status: DISCONTINUED | OUTPATIENT
Start: 2021-01-01 | End: 2021-01-01 | Stop reason: HOSPADM

## 2021-01-01 RX ORDER — IPRATROPIUM BROMIDE AND ALBUTEROL SULFATE 2.5; .5 MG/3ML; MG/3ML
1 SOLUTION RESPIRATORY (INHALATION)
Status: DISCONTINUED | OUTPATIENT
Start: 2021-01-01 | End: 2021-01-01

## 2021-01-01 RX ORDER — SODIUM CHLORIDE, SODIUM LACTATE, POTASSIUM CHLORIDE, AND CALCIUM CHLORIDE .6; .31; .03; .02 G/100ML; G/100ML; G/100ML; G/100ML
1000 INJECTION, SOLUTION INTRAVENOUS ONCE
Status: COMPLETED | OUTPATIENT
Start: 2021-01-01 | End: 2021-01-01

## 2021-01-01 RX ORDER — DEXTROSE AND SODIUM CHLORIDE 5; .45 G/100ML; G/100ML
INJECTION, SOLUTION INTRAVENOUS CONTINUOUS
Status: DISCONTINUED | OUTPATIENT
Start: 2021-01-01 | End: 2021-01-01

## 2021-01-01 RX ORDER — SODIUM CHLORIDE 9 MG/ML
25 INJECTION, SOLUTION INTRAVENOUS PRN
Status: CANCELLED | OUTPATIENT
Start: 2021-01-01

## 2021-01-01 RX ORDER — ONDANSETRON 2 MG/ML
4 INJECTION INTRAMUSCULAR; INTRAVENOUS EVERY 6 HOURS PRN
Status: DISCONTINUED | OUTPATIENT
Start: 2021-01-01 | End: 2021-01-01 | Stop reason: SDUPTHER

## 2021-01-01 RX ORDER — IPRATROPIUM BROMIDE AND ALBUTEROL SULFATE 2.5; .5 MG/3ML; MG/3ML
1 SOLUTION RESPIRATORY (INHALATION) EVERY 4 HOURS PRN
Status: DISCONTINUED | OUTPATIENT
Start: 2021-01-01 | End: 2021-01-01

## 2021-01-01 RX ORDER — BUDESONIDE AND FORMOTEROL FUMARATE DIHYDRATE 160; 4.5 UG/1; UG/1
2 AEROSOL RESPIRATORY (INHALATION) 2 TIMES DAILY
Status: DISCONTINUED | OUTPATIENT
Start: 2021-01-01 | End: 2021-01-01 | Stop reason: HOSPADM

## 2021-01-01 RX ORDER — TRAZODONE HYDROCHLORIDE 50 MG/1
50 TABLET ORAL NIGHTLY PRN
COMMUNITY

## 2021-01-01 RX ORDER — IPRATROPIUM BROMIDE AND ALBUTEROL SULFATE 2.5; .5 MG/3ML; MG/3ML
SOLUTION RESPIRATORY (INHALATION)
Status: COMPLETED
Start: 2021-01-01 | End: 2021-01-01

## 2021-01-01 RX ORDER — HEPARIN SODIUM 1000 [USP'U]/ML
40 INJECTION, SOLUTION INTRAVENOUS; SUBCUTANEOUS PRN
Status: DISCONTINUED | OUTPATIENT
Start: 2021-01-01 | End: 2021-01-01

## 2021-01-01 RX ORDER — TAMSULOSIN HYDROCHLORIDE 0.4 MG/1
0.4 CAPSULE ORAL DAILY
Qty: 30 CAPSULE | Refills: 3 | Status: SHIPPED | OUTPATIENT
Start: 2021-01-01

## 2021-01-01 RX ORDER — METHYLPREDNISOLONE SODIUM SUCCINATE 40 MG/ML
40 INJECTION, POWDER, LYOPHILIZED, FOR SOLUTION INTRAMUSCULAR; INTRAVENOUS EVERY 8 HOURS
Status: DISCONTINUED | OUTPATIENT
Start: 2021-01-01 | End: 2021-01-01

## 2021-01-01 RX ORDER — OLANZAPINE 10 MG/1
10 INJECTION, POWDER, LYOPHILIZED, FOR SOLUTION INTRAMUSCULAR ONCE
Status: COMPLETED | OUTPATIENT
Start: 2021-01-01 | End: 2021-01-01

## 2021-01-01 RX ORDER — MAGNESIUM HYDROXIDE/ALUMINUM HYDROXICE/SIMETHICONE 120; 1200; 1200 MG/30ML; MG/30ML; MG/30ML
30 SUSPENSION ORAL EVERY 4 HOURS PRN
COMMUNITY

## 2021-01-01 RX ORDER — POTASSIUM CHLORIDE 7.45 MG/ML
10 INJECTION INTRAVENOUS PRN
Status: DISCONTINUED | OUTPATIENT
Start: 2021-01-01 | End: 2021-01-01 | Stop reason: HOSPADM

## 2021-01-01 RX ORDER — BUDESONIDE 0.25 MG/2ML
250 INHALANT ORAL 2 TIMES DAILY
Status: DISCONTINUED | OUTPATIENT
Start: 2021-01-01 | End: 2021-01-01 | Stop reason: SDUPTHER

## 2021-01-01 RX ORDER — PREDNISONE 20 MG/1
40 TABLET ORAL DAILY
Status: DISCONTINUED | OUTPATIENT
Start: 2021-01-01 | End: 2021-01-01

## 2021-01-01 RX ORDER — SODIUM CHLORIDE 0.9 % (FLUSH) 0.9 %
5-40 SYRINGE (ML) INJECTION EVERY 12 HOURS SCHEDULED
Status: CANCELLED | OUTPATIENT
Start: 2021-01-01

## 2021-01-01 RX ORDER — ONDANSETRON 2 MG/ML
4 INJECTION INTRAMUSCULAR; INTRAVENOUS EVERY 6 HOURS PRN
Status: DISCONTINUED | OUTPATIENT
Start: 2021-01-01 | End: 2021-01-01 | Stop reason: HOSPADM

## 2021-01-01 RX ORDER — ACETAMINOPHEN 325 MG/1
650 TABLET ORAL EVERY 6 HOURS PRN
Status: DISCONTINUED | OUTPATIENT
Start: 2021-01-01 | End: 2021-01-01 | Stop reason: SDUPTHER

## 2021-01-01 RX ORDER — METHYLPREDNISOLONE 4 MG/1
TABLET ORAL
Qty: 1 KIT | Refills: 0 | Status: SHIPPED | OUTPATIENT
Start: 2021-01-01 | End: 2021-01-01 | Stop reason: CLARIF

## 2021-01-01 RX ORDER — ESCITALOPRAM OXALATE 10 MG/1
10 TABLET ORAL DAILY
Status: DISCONTINUED | OUTPATIENT
Start: 2021-01-01 | End: 2021-01-01 | Stop reason: HOSPADM

## 2021-01-01 RX ORDER — METHYLPREDNISOLONE SODIUM SUCCINATE 125 MG/2ML
125 INJECTION, POWDER, LYOPHILIZED, FOR SOLUTION INTRAMUSCULAR; INTRAVENOUS ONCE
Status: COMPLETED | OUTPATIENT
Start: 2021-01-01 | End: 2021-01-01

## 2021-01-01 RX ORDER — POTASSIUM CHLORIDE 20 MEQ/1
20 TABLET, EXTENDED RELEASE ORAL ONCE
Status: COMPLETED | OUTPATIENT
Start: 2021-01-01 | End: 2021-01-01

## 2021-01-01 RX ADMIN — CEFEPIME HYDROCHLORIDE 1000 MG: 1 INJECTION, POWDER, FOR SOLUTION INTRAMUSCULAR; INTRAVENOUS at 14:44

## 2021-01-01 RX ADMIN — RISPERIDONE 0.25 MG: 0.25 TABLET ORAL at 09:26

## 2021-01-01 RX ADMIN — GLYCOPYRROLATE AND FORMOTEROL FUMARATE 2 PUFF: 9; 4.8 AEROSOL, METERED RESPIRATORY (INHALATION) at 09:29

## 2021-01-01 RX ADMIN — LEVOTHYROXINE SODIUM 88 MCG: 0.09 TABLET ORAL at 07:05

## 2021-01-01 RX ADMIN — TAMSULOSIN HYDROCHLORIDE 0.4 MG: 0.4 CAPSULE ORAL at 08:13

## 2021-01-01 RX ADMIN — VANCOMYCIN HYDROCHLORIDE 1250 MG: 10 INJECTION, POWDER, LYOPHILIZED, FOR SOLUTION INTRAVENOUS at 15:19

## 2021-01-01 RX ADMIN — Medication 10 ML: at 09:23

## 2021-01-01 RX ADMIN — GLYCOPYRROLATE AND FORMOTEROL FUMARATE 2 PUFF: 9; 4.8 AEROSOL, METERED RESPIRATORY (INHALATION) at 20:18

## 2021-01-01 RX ADMIN — FLUTICASONE PROPIONATE 1 PUFF: 44 AEROSOL, METERED RESPIRATORY (INHALATION) at 08:33

## 2021-01-01 RX ADMIN — AMOXICILLIN AND CLAVULANATE POTASSIUM 1 TABLET: 875; 125 TABLET, FILM COATED ORAL at 20:04

## 2021-01-01 RX ADMIN — Medication: at 09:23

## 2021-01-01 RX ADMIN — INSULIN LISPRO 2 UNITS: 100 INJECTION, SOLUTION INTRAVENOUS; SUBCUTANEOUS at 13:02

## 2021-01-01 RX ADMIN — GLYCOPYRROLATE AND FORMOTEROL FUMARATE 2 PUFF: 9; 4.8 AEROSOL, METERED RESPIRATORY (INHALATION) at 20:31

## 2021-01-01 RX ADMIN — TAMSULOSIN HYDROCHLORIDE 0.4 MG: 0.4 CAPSULE ORAL at 08:19

## 2021-01-01 RX ADMIN — LEVOTHYROXINE SODIUM 88 MCG: 0.09 TABLET ORAL at 06:30

## 2021-01-01 RX ADMIN — IPRATROPIUM BROMIDE AND ALBUTEROL 1 PUFF: 20; 100 SPRAY, METERED RESPIRATORY (INHALATION) at 08:31

## 2021-01-01 RX ADMIN — OLANZAPINE 10 MG: 10 INJECTION, POWDER, FOR SOLUTION INTRAMUSCULAR at 22:15

## 2021-01-01 RX ADMIN — EPINEPHRINE 1 MG: 0.1 INJECTION INTRACARDIAC; INTRAVENOUS at 03:17

## 2021-01-01 RX ADMIN — Medication 10 ML: at 21:56

## 2021-01-01 RX ADMIN — DEXAMETHASONE SODIUM PHOSPHATE 10 MG: 10 INJECTION, SOLUTION INTRAMUSCULAR; INTRAVENOUS at 23:00

## 2021-01-01 RX ADMIN — CEFEPIME HYDROCHLORIDE 2000 MG: 2 INJECTION, POWDER, FOR SOLUTION INTRAVENOUS at 23:36

## 2021-01-01 RX ADMIN — FLUTICASONE PROPIONATE 1 PUFF: 44 AEROSOL, METERED RESPIRATORY (INHALATION) at 08:40

## 2021-01-01 RX ADMIN — Medication 10 ML: at 20:04

## 2021-01-01 RX ADMIN — ENOXAPARIN SODIUM 40 MG: 40 INJECTION SUBCUTANEOUS at 18:38

## 2021-01-01 RX ADMIN — APIXABAN 5 MG: 5 TABLET, FILM COATED ORAL at 21:10

## 2021-01-01 RX ADMIN — Medication 2 PUFF: at 09:04

## 2021-01-01 RX ADMIN — FLUTICASONE PROPIONATE 1 PUFF: 44 AEROSOL, METERED RESPIRATORY (INHALATION) at 08:24

## 2021-01-01 RX ADMIN — PREDNISONE 40 MG: 20 TABLET ORAL at 08:31

## 2021-01-01 RX ADMIN — AZITHROMYCIN MONOHYDRATE 500 MG: 500 INJECTION, POWDER, LYOPHILIZED, FOR SOLUTION INTRAVENOUS at 17:17

## 2021-01-01 RX ADMIN — PREDNISONE 40 MG: 20 TABLET ORAL at 18:38

## 2021-01-01 RX ADMIN — GLYCOPYRROLATE AND FORMOTEROL FUMARATE 2 PUFF: 9; 4.8 AEROSOL, METERED RESPIRATORY (INHALATION) at 08:33

## 2021-01-01 RX ADMIN — SODIUM CHLORIDE: 9 INJECTION, SOLUTION INTRAVENOUS at 02:02

## 2021-01-01 RX ADMIN — METHYLPREDNISOLONE SODIUM SUCCINATE 125 MG: 125 INJECTION, POWDER, FOR SOLUTION INTRAMUSCULAR; INTRAVENOUS at 18:32

## 2021-01-01 RX ADMIN — ASPIRIN 325 MG ORAL TABLET 325 MG: 325 PILL ORAL at 08:57

## 2021-01-01 RX ADMIN — SODIUM CHLORIDE: 9 INJECTION, SOLUTION INTRAVENOUS at 23:08

## 2021-01-01 RX ADMIN — IPRATROPIUM BROMIDE AND ALBUTEROL 1 PUFF: 20; 100 SPRAY, METERED RESPIRATORY (INHALATION) at 20:12

## 2021-01-01 RX ADMIN — Medication: at 08:21

## 2021-01-01 RX ADMIN — INSULIN LISPRO 1 UNITS: 100 INJECTION, SOLUTION INTRAVENOUS; SUBCUTANEOUS at 22:32

## 2021-01-01 RX ADMIN — AMOXICILLIN AND CLAVULANATE POTASSIUM 1 TABLET: 875; 125 TABLET, FILM COATED ORAL at 20:03

## 2021-01-01 RX ADMIN — POTASSIUM CHLORIDE 10 MEQ: 7.46 INJECTION, SOLUTION INTRAVENOUS at 08:50

## 2021-01-01 RX ADMIN — MAGNESIUM SULFATE HEPTAHYDRATE 2000 MG: 40 INJECTION, SOLUTION INTRAVENOUS at 11:08

## 2021-01-01 RX ADMIN — LORAZEPAM 0.5 MG: 2 INJECTION INTRAMUSCULAR; INTRAVENOUS at 23:35

## 2021-01-01 RX ADMIN — FLUTICASONE PROPIONATE 1 PUFF: 44 AEROSOL, METERED RESPIRATORY (INHALATION) at 20:13

## 2021-01-01 RX ADMIN — ASPIRIN 325 MG ORAL TABLET 325 MG: 325 PILL ORAL at 18:32

## 2021-01-01 RX ADMIN — TIOTROPIUM BROMIDE INHALATION SPRAY 2 PUFF: 3.12 SPRAY, METERED RESPIRATORY (INHALATION) at 09:04

## 2021-01-01 RX ADMIN — DEXTROSE AND SODIUM CHLORIDE: 5; 450 INJECTION, SOLUTION INTRAVENOUS at 19:07

## 2021-01-01 RX ADMIN — POTASSIUM CHLORIDE 40 MEQ: 1500 TABLET, EXTENDED RELEASE ORAL at 09:38

## 2021-01-01 RX ADMIN — INSULIN LISPRO 2 UNITS: 100 INJECTION, SOLUTION INTRAVENOUS; SUBCUTANEOUS at 18:11

## 2021-01-01 RX ADMIN — TAMSULOSIN HYDROCHLORIDE 0.4 MG: 0.4 CAPSULE ORAL at 09:26

## 2021-01-01 RX ADMIN — SODIUM CHLORIDE, POTASSIUM CHLORIDE, SODIUM LACTATE AND CALCIUM CHLORIDE 1000 ML: 600; 310; 30; 20 INJECTION, SOLUTION INTRAVENOUS at 15:19

## 2021-01-01 RX ADMIN — FLUTICASONE PROPIONATE 1 PUFF: 44 AEROSOL, METERED RESPIRATORY (INHALATION) at 20:50

## 2021-01-01 RX ADMIN — LEVOTHYROXINE SODIUM 88 MCG: 0.09 TABLET ORAL at 06:12

## 2021-01-01 RX ADMIN — Medication: at 08:15

## 2021-01-01 RX ADMIN — ENOXAPARIN SODIUM 40 MG: 40 INJECTION SUBCUTANEOUS at 08:51

## 2021-01-01 RX ADMIN — ENOXAPARIN SODIUM 40 MG: 40 INJECTION SUBCUTANEOUS at 08:56

## 2021-01-01 RX ADMIN — IPRATROPIUM BROMIDE AND ALBUTEROL 1 PUFF: 20; 100 SPRAY, METERED RESPIRATORY (INHALATION) at 08:07

## 2021-01-01 RX ADMIN — SODIUM CHLORIDE 1000 ML: 9 INJECTION, SOLUTION INTRAVENOUS at 22:51

## 2021-01-01 RX ADMIN — FLUCONAZOLE 100 MG: 200 TABLET ORAL at 21:43

## 2021-01-01 RX ADMIN — TRAZODONE HYDROCHLORIDE 50 MG: 50 TABLET ORAL at 21:55

## 2021-01-01 RX ADMIN — FLUTICASONE PROPIONATE 1 PUFF: 44 AEROSOL, METERED RESPIRATORY (INHALATION) at 08:06

## 2021-01-01 RX ADMIN — APIXABAN 5 MG: 5 TABLET, FILM COATED ORAL at 08:13

## 2021-01-01 RX ADMIN — IPRATROPIUM BROMIDE AND ALBUTEROL 1 PUFF: 20; 100 SPRAY, METERED RESPIRATORY (INHALATION) at 20:05

## 2021-01-01 RX ADMIN — INSULIN LISPRO 2 UNITS: 100 INJECTION, SOLUTION INTRAVENOUS; SUBCUTANEOUS at 21:43

## 2021-01-01 RX ADMIN — FLUTICASONE PROPIONATE 1 PUFF: 44 AEROSOL, METERED RESPIRATORY (INHALATION) at 20:18

## 2021-01-01 RX ADMIN — AMOXICILLIN AND CLAVULANATE POTASSIUM 1 TABLET: 875; 125 TABLET, FILM COATED ORAL at 09:22

## 2021-01-01 RX ADMIN — PHENYLEPHRINE HYDROCHLORIDE 30 MCG/MIN: 10 INJECTION INTRAVENOUS at 03:26

## 2021-01-01 RX ADMIN — CEFEPIME HYDROCHLORIDE 1000 MG: 1 INJECTION, POWDER, FOR SOLUTION INTRAMUSCULAR; INTRAVENOUS at 02:01

## 2021-01-01 RX ADMIN — FLUTICASONE PROPIONATE 1 PUFF: 44 AEROSOL, METERED RESPIRATORY (INHALATION) at 09:29

## 2021-01-01 RX ADMIN — FLUCONAZOLE 100 MG: 200 TABLET ORAL at 08:31

## 2021-01-01 RX ADMIN — HEPARIN SODIUM 18 UNITS/KG/HR: 10000 INJECTION, SOLUTION INTRAVENOUS at 18:09

## 2021-01-01 RX ADMIN — ESCITALOPRAM OXALATE 10 MG: 10 TABLET ORAL at 09:25

## 2021-01-01 RX ADMIN — IPRATROPIUM BROMIDE AND ALBUTEROL 1 PUFF: 20; 100 SPRAY, METERED RESPIRATORY (INHALATION) at 15:00

## 2021-01-01 RX ADMIN — GLYCOPYRROLATE AND FORMOTEROL FUMARATE 2 PUFF: 9; 4.8 AEROSOL, METERED RESPIRATORY (INHALATION) at 20:49

## 2021-01-01 RX ADMIN — FLUCONAZOLE 100 MG: 200 TABLET ORAL at 09:10

## 2021-01-01 RX ADMIN — IPRATROPIUM BROMIDE AND ALBUTEROL 1 PUFF: 20; 100 SPRAY, METERED RESPIRATORY (INHALATION) at 14:14

## 2021-01-01 RX ADMIN — IPRATROPIUM BROMIDE AND ALBUTEROL 1 PUFF: 20; 100 SPRAY, METERED RESPIRATORY (INHALATION) at 17:31

## 2021-01-01 RX ADMIN — APIXABAN 5 MG: 5 TABLET, FILM COATED ORAL at 20:03

## 2021-01-01 RX ADMIN — TAMSULOSIN HYDROCHLORIDE 0.4 MG: 0.4 CAPSULE ORAL at 09:22

## 2021-01-01 RX ADMIN — RISPERIDONE 0.25 MG: 0.25 TABLET ORAL at 08:31

## 2021-01-01 RX ADMIN — Medication 10 ML: at 09:28

## 2021-01-01 RX ADMIN — FLUTICASONE PROPIONATE 1 PUFF: 44 AEROSOL, METERED RESPIRATORY (INHALATION) at 21:20

## 2021-01-01 RX ADMIN — Medication 10 ML: at 08:14

## 2021-01-01 RX ADMIN — SODIUM CHLORIDE: 9 INJECTION, SOLUTION INTRAVENOUS at 02:47

## 2021-01-01 RX ADMIN — APIXABAN 5 MG: 5 TABLET, FILM COATED ORAL at 21:55

## 2021-01-01 RX ADMIN — SODIUM CHLORIDE 1000 ML: 9 INJECTION, SOLUTION INTRAVENOUS at 23:00

## 2021-01-01 RX ADMIN — CEFEPIME HYDROCHLORIDE 2000 MG: 2 INJECTION, POWDER, FOR SOLUTION INTRAVENOUS at 02:18

## 2021-01-01 RX ADMIN — IPRATROPIUM BROMIDE AND ALBUTEROL 1 PUFF: 20; 100 SPRAY, METERED RESPIRATORY (INHALATION) at 08:24

## 2021-01-01 RX ADMIN — ESCITALOPRAM OXALATE 10 MG: 10 TABLET ORAL at 08:18

## 2021-01-01 RX ADMIN — ASPIRIN 325 MG ORAL TABLET 325 MG: 325 PILL ORAL at 18:38

## 2021-01-01 RX ADMIN — POTASSIUM CHLORIDE 40 MEQ: 1500 TABLET, EXTENDED RELEASE ORAL at 22:26

## 2021-01-01 RX ADMIN — OLANZAPINE 10 MG: 10 INJECTION, POWDER, FOR SOLUTION INTRAMUSCULAR at 01:03

## 2021-01-01 RX ADMIN — Medication 10 ML: at 08:19

## 2021-01-01 RX ADMIN — DEXTROSE AND SODIUM CHLORIDE: 5; 450 INJECTION, SOLUTION INTRAVENOUS at 00:59

## 2021-01-01 RX ADMIN — DULOXETINE HYDROCHLORIDE 60 MG: 30 CAPSULE, DELAYED RELEASE ORAL at 08:57

## 2021-01-01 RX ADMIN — Medication 1000 MG: at 17:17

## 2021-01-01 RX ADMIN — IPRATROPIUM BROMIDE AND ALBUTEROL 1 PUFF: 20; 100 SPRAY, METERED RESPIRATORY (INHALATION) at 13:08

## 2021-01-01 RX ADMIN — OLANZAPINE 10 MG: 10 INJECTION, POWDER, FOR SOLUTION INTRAMUSCULAR at 02:45

## 2021-01-01 RX ADMIN — Medication 1000 MG: at 18:32

## 2021-01-01 RX ADMIN — INSULIN LISPRO 3 UNITS: 100 INJECTION, SOLUTION INTRAVENOUS; SUBCUTANEOUS at 22:27

## 2021-01-01 RX ADMIN — LEVOTHYROXINE SODIUM 88 MCG: 0.09 TABLET ORAL at 05:43

## 2021-01-01 RX ADMIN — AMOXICILLIN AND CLAVULANATE POTASSIUM 1 TABLET: 875; 125 TABLET, FILM COATED ORAL at 09:38

## 2021-01-01 RX ADMIN — Medication: at 21:55

## 2021-01-01 RX ADMIN — METHYLPREDNISOLONE SODIUM SUCCINATE 40 MG: 40 INJECTION, POWDER, FOR SOLUTION INTRAMUSCULAR; INTRAVENOUS at 02:05

## 2021-01-01 RX ADMIN — ENOXAPARIN SODIUM 40 MG: 40 INJECTION SUBCUTANEOUS at 08:31

## 2021-01-01 RX ADMIN — ACETAMINOPHEN 650 MG: 325 TABLET ORAL at 04:17

## 2021-01-01 RX ADMIN — IPRATROPIUM BROMIDE AND ALBUTEROL SULFATE 1 AMPULE: .5; 3 SOLUTION RESPIRATORY (INHALATION) at 22:42

## 2021-01-01 RX ADMIN — INSULIN GLARGINE 5 UNITS: 100 INJECTION, SOLUTION SUBCUTANEOUS at 22:51

## 2021-01-01 RX ADMIN — IPRATROPIUM BROMIDE AND ALBUTEROL SULFATE 1 AMPULE: 2.5; .5 SOLUTION RESPIRATORY (INHALATION) at 22:42

## 2021-01-01 RX ADMIN — GLYCOPYRROLATE AND FORMOTEROL FUMARATE 2 PUFF: 9; 4.8 AEROSOL, METERED RESPIRATORY (INHALATION) at 08:06

## 2021-01-01 RX ADMIN — GLYCOPYRROLATE AND FORMOTEROL FUMARATE 2 PUFF: 9; 4.8 AEROSOL, METERED RESPIRATORY (INHALATION) at 08:29

## 2021-01-01 RX ADMIN — IOPAMIDOL 75 ML: 755 INJECTION, SOLUTION INTRAVENOUS at 15:35

## 2021-01-01 RX ADMIN — ENOXAPARIN SODIUM 40 MG: 40 INJECTION SUBCUTANEOUS at 09:27

## 2021-01-01 RX ADMIN — INSULIN LISPRO 4 UNITS: 100 INJECTION, SOLUTION INTRAVENOUS; SUBCUTANEOUS at 11:40

## 2021-01-01 RX ADMIN — INSULIN GLARGINE 5 UNITS: 100 INJECTION, SOLUTION SUBCUTANEOUS at 21:10

## 2021-01-01 RX ADMIN — ROCURONIUM BROMIDE 80 MG: 10 INJECTION INTRAVENOUS at 02:53

## 2021-01-01 RX ADMIN — IPRATROPIUM BROMIDE AND ALBUTEROL 1 PUFF: 20; 100 SPRAY, METERED RESPIRATORY (INHALATION) at 20:18

## 2021-01-01 RX ADMIN — ASPIRIN 325 MG ORAL TABLET 325 MG: 325 PILL ORAL at 08:19

## 2021-01-01 RX ADMIN — APIXABAN 5 MG: 5 TABLET, FILM COATED ORAL at 09:22

## 2021-01-01 RX ADMIN — FLUTICASONE PROPIONATE 1 PUFF: 44 AEROSOL, METERED RESPIRATORY (INHALATION) at 20:05

## 2021-01-01 RX ADMIN — GLYCOPYRROLATE AND FORMOTEROL FUMARATE 2 PUFF: 9; 4.8 AEROSOL, METERED RESPIRATORY (INHALATION) at 09:30

## 2021-01-01 RX ADMIN — ESCITALOPRAM OXALATE 10 MG: 10 TABLET ORAL at 08:31

## 2021-01-01 RX ADMIN — IPRATROPIUM BROMIDE AND ALBUTEROL 1 PUFF: 20; 100 SPRAY, METERED RESPIRATORY (INHALATION) at 14:56

## 2021-01-01 RX ADMIN — FLUTICASONE PROPIONATE 1 PUFF: 44 AEROSOL, METERED RESPIRATORY (INHALATION) at 20:30

## 2021-01-01 RX ADMIN — FLUTICASONE PROPIONATE 1 PUFF: 44 AEROSOL, METERED RESPIRATORY (INHALATION) at 20:20

## 2021-01-01 RX ADMIN — LEVOTHYROXINE SODIUM 88 MCG: 0.09 TABLET ORAL at 05:39

## 2021-01-01 RX ADMIN — TAMSULOSIN HYDROCHLORIDE 0.4 MG: 0.4 CAPSULE ORAL at 09:25

## 2021-01-01 RX ADMIN — DULOXETINE HYDROCHLORIDE 60 MG: 30 CAPSULE, DELAYED RELEASE ORAL at 08:51

## 2021-01-01 RX ADMIN — Medication 10 MCG/MIN: at 00:30

## 2021-01-01 RX ADMIN — ALBUTEROL SULFATE 2.5 MG: 2.5 SOLUTION RESPIRATORY (INHALATION) at 13:43

## 2021-01-01 RX ADMIN — IPRATROPIUM BROMIDE AND ALBUTEROL 1 PUFF: 20; 100 SPRAY, METERED RESPIRATORY (INHALATION) at 20:21

## 2021-01-01 RX ADMIN — DEXTROSE AND SODIUM CHLORIDE: 5; 450 INJECTION, SOLUTION INTRAVENOUS at 10:17

## 2021-01-01 RX ADMIN — INSULIN LISPRO 2 UNITS: 100 INJECTION, SOLUTION INTRAVENOUS; SUBCUTANEOUS at 08:53

## 2021-01-01 RX ADMIN — IPRATROPIUM BROMIDE AND ALBUTEROL 1 PUFF: 20; 100 SPRAY, METERED RESPIRATORY (INHALATION) at 08:39

## 2021-01-01 RX ADMIN — SODIUM CHLORIDE: 9 INJECTION, SOLUTION INTRAVENOUS at 09:25

## 2021-01-01 RX ADMIN — Medication: at 21:10

## 2021-01-01 RX ADMIN — POTASSIUM CHLORIDE 40 MEQ: 1500 TABLET, EXTENDED RELEASE ORAL at 14:21

## 2021-01-01 RX ADMIN — IPRATROPIUM BROMIDE AND ALBUTEROL 1 PUFF: 20; 100 SPRAY, METERED RESPIRATORY (INHALATION) at 20:30

## 2021-01-01 RX ADMIN — ASPIRIN 325 MG ORAL TABLET 325 MG: 325 PILL ORAL at 09:10

## 2021-01-01 RX ADMIN — IPRATROPIUM BROMIDE AND ALBUTEROL 1 PUFF: 20; 100 SPRAY, METERED RESPIRATORY (INHALATION) at 09:29

## 2021-01-01 RX ADMIN — ESCITALOPRAM OXALATE 10 MG: 10 TABLET ORAL at 08:14

## 2021-01-01 RX ADMIN — Medication 10 ML: at 20:05

## 2021-01-01 RX ADMIN — INSULIN GLARGINE 5 UNITS: 100 INJECTION, SOLUTION SUBCUTANEOUS at 22:32

## 2021-01-01 RX ADMIN — INSULIN LISPRO 6 UNITS: 100 INJECTION, SOLUTION INTRAVENOUS; SUBCUTANEOUS at 18:21

## 2021-01-01 RX ADMIN — APIXABAN 5 MG: 5 TABLET, FILM COATED ORAL at 08:19

## 2021-01-01 RX ADMIN — ASPIRIN 325 MG ORAL TABLET 325 MG: 325 PILL ORAL at 08:51

## 2021-01-01 RX ADMIN — INSULIN LISPRO 2 UNITS: 100 INJECTION, SOLUTION INTRAVENOUS; SUBCUTANEOUS at 09:13

## 2021-01-01 RX ADMIN — AMOXICILLIN AND CLAVULANATE POTASSIUM 1 TABLET: 875; 125 TABLET, FILM COATED ORAL at 09:25

## 2021-01-01 RX ADMIN — EPINEPHRINE 1 MG: 0.1 INJECTION INTRACARDIAC; INTRAVENOUS at 03:14

## 2021-01-01 RX ADMIN — AMOXICILLIN AND CLAVULANATE POTASSIUM 1 TABLET: 875; 125 TABLET, FILM COATED ORAL at 08:19

## 2021-01-01 RX ADMIN — INSULIN LISPRO 4 UNITS: 100 INJECTION, SOLUTION INTRAVENOUS; SUBCUTANEOUS at 08:35

## 2021-01-01 RX ADMIN — GLYCOPYRROLATE AND FORMOTEROL FUMARATE 2 PUFF: 9; 4.8 AEROSOL, METERED RESPIRATORY (INHALATION) at 20:50

## 2021-01-01 RX ADMIN — APIXABAN 5 MG: 5 TABLET, FILM COATED ORAL at 09:25

## 2021-01-01 RX ADMIN — GLYCOPYRROLATE AND FORMOTEROL FUMARATE 2 PUFF: 9; 4.8 AEROSOL, METERED RESPIRATORY (INHALATION) at 20:12

## 2021-01-01 RX ADMIN — Medication 10 ML: at 09:25

## 2021-01-01 RX ADMIN — Medication 10 ML: at 08:20

## 2021-01-01 RX ADMIN — SODIUM BICARBONATE 50 MEQ: 84 INJECTION INTRAVENOUS at 02:55

## 2021-01-01 RX ADMIN — APIXABAN 5 MG: 5 TABLET, FILM COATED ORAL at 12:31

## 2021-01-01 RX ADMIN — POTASSIUM CHLORIDE 40 MEQ: 1500 TABLET, EXTENDED RELEASE ORAL at 08:57

## 2021-01-01 RX ADMIN — INSULIN LISPRO 4 UNITS: 100 INJECTION, SOLUTION INTRAVENOUS; SUBCUTANEOUS at 17:44

## 2021-01-01 RX ADMIN — ASPIRIN 325 MG ORAL TABLET 325 MG: 325 PILL ORAL at 09:22

## 2021-01-01 RX ADMIN — IPRATROPIUM BROMIDE AND ALBUTEROL SULFATE 1 AMPULE: .5; 3 SOLUTION RESPIRATORY (INHALATION) at 13:43

## 2021-01-01 RX ADMIN — FLUTICASONE PROPIONATE 1 PUFF: 44 AEROSOL, METERED RESPIRATORY (INHALATION) at 08:28

## 2021-01-01 RX ADMIN — Medication 10 ML: at 21:14

## 2021-01-01 RX ADMIN — METHYLPREDNISOLONE SODIUM SUCCINATE 40 MG: 40 INJECTION, POWDER, FOR SOLUTION INTRAMUSCULAR; INTRAVENOUS at 08:51

## 2021-01-01 RX ADMIN — ESCITALOPRAM OXALATE 10 MG: 10 TABLET ORAL at 09:26

## 2021-01-01 RX ADMIN — SODIUM BICARBONATE 50 MEQ: 84 INJECTION INTRAVENOUS at 03:12

## 2021-01-01 RX ADMIN — PERFLUTREN 1.65 MG: 6.52 INJECTION, SUSPENSION INTRAVENOUS at 10:20

## 2021-01-01 RX ADMIN — Medication: at 20:06

## 2021-01-01 RX ADMIN — ASPIRIN 325 MG ORAL TABLET 325 MG: 325 PILL ORAL at 08:13

## 2021-01-01 RX ADMIN — TIOTROPIUM BROMIDE INHALATION SPRAY 2 PUFF: 3.12 SPRAY, METERED RESPIRATORY (INHALATION) at 11:59

## 2021-01-01 RX ADMIN — SODIUM CHLORIDE 500 ML: 9 INJECTION, SOLUTION INTRAVENOUS at 12:20

## 2021-01-01 RX ADMIN — ASPIRIN 325 MG ORAL TABLET 325 MG: 325 PILL ORAL at 09:25

## 2021-01-01 RX ADMIN — SODIUM CHLORIDE, POTASSIUM CHLORIDE, SODIUM LACTATE AND CALCIUM CHLORIDE 500 ML: 600; 310; 30; 20 INJECTION, SOLUTION INTRAVENOUS at 19:13

## 2021-01-01 RX ADMIN — GLYCOPYRROLATE AND FORMOTEROL FUMARATE 2 PUFF: 9; 4.8 AEROSOL, METERED RESPIRATORY (INHALATION) at 21:20

## 2021-01-01 RX ADMIN — INSULIN LISPRO 4 UNITS: 100 INJECTION, SOLUTION INTRAVENOUS; SUBCUTANEOUS at 17:19

## 2021-01-01 RX ADMIN — IPRATROPIUM BROMIDE AND ALBUTEROL SULFATE 1 AMPULE: .5; 3 SOLUTION RESPIRATORY (INHALATION) at 14:17

## 2021-01-01 RX ADMIN — Medication 15 MCG/MIN: at 01:00

## 2021-01-01 RX ADMIN — CEFEPIME HYDROCHLORIDE 1000 MG: 1 INJECTION, POWDER, FOR SOLUTION INTRAMUSCULAR; INTRAVENOUS at 16:46

## 2021-01-01 RX ADMIN — ASPIRIN 325 MG ORAL TABLET 325 MG: 325 PILL ORAL at 08:31

## 2021-01-01 RX ADMIN — IPRATROPIUM BROMIDE AND ALBUTEROL 1 PUFF: 20; 100 SPRAY, METERED RESPIRATORY (INHALATION) at 15:20

## 2021-01-01 RX ADMIN — VASOPRESSIN 0.01 UNITS/MIN: 20 INJECTION INTRAVENOUS at 01:59

## 2021-01-01 RX ADMIN — IPRATROPIUM BROMIDE AND ALBUTEROL 1 PUFF: 20; 100 SPRAY, METERED RESPIRATORY (INHALATION) at 20:50

## 2021-01-01 RX ADMIN — CEFEPIME HYDROCHLORIDE 1000 MG: 1 INJECTION, POWDER, FOR SOLUTION INTRAMUSCULAR; INTRAVENOUS at 04:41

## 2021-01-01 RX ADMIN — POTASSIUM CHLORIDE 20 MEQ: 1500 TABLET, EXTENDED RELEASE ORAL at 08:19

## 2021-01-01 RX ADMIN — ESCITALOPRAM OXALATE 10 MG: 10 TABLET ORAL at 09:10

## 2021-01-01 RX ADMIN — INSULIN LISPRO 2 UNITS: 100 INJECTION, SOLUTION INTRAVENOUS; SUBCUTANEOUS at 12:05

## 2021-01-01 RX ADMIN — Medication 2 PUFF: at 20:42

## 2021-01-01 RX ADMIN — AMOXICILLIN AND CLAVULANATE POTASSIUM 1 TABLET: 875; 125 TABLET, FILM COATED ORAL at 21:10

## 2021-01-01 RX ADMIN — AZITHROMYCIN MONOHYDRATE 500 MG: 500 INJECTION, POWDER, LYOPHILIZED, FOR SOLUTION INTRAVENOUS at 18:32

## 2021-01-01 RX ADMIN — LIDOCAINE HYDROCHLORIDE: 20 JELLY TOPICAL at 22:06

## 2021-01-01 RX ADMIN — APIXABAN 5 MG: 5 TABLET, FILM COATED ORAL at 20:04

## 2021-01-01 RX ADMIN — INSULIN LISPRO 1 UNITS: 100 INJECTION, SOLUTION INTRAVENOUS; SUBCUTANEOUS at 22:52

## 2021-01-01 RX ADMIN — ASPIRIN 325 MG ORAL TABLET 325 MG: 325 PILL ORAL at 09:26

## 2021-01-01 RX ADMIN — SODIUM CHLORIDE, SODIUM LACTATE, POTASSIUM CHLORIDE, AND CALCIUM CHLORIDE 500 ML: .6; .31; .03; .02 INJECTION, SOLUTION INTRAVENOUS at 05:44

## 2021-01-01 RX ADMIN — TAMSULOSIN HYDROCHLORIDE 0.4 MG: 0.4 CAPSULE ORAL at 09:11

## 2021-01-01 RX ADMIN — SODIUM CHLORIDE: 9 INJECTION, SOLUTION INTRAVENOUS at 16:26

## 2021-01-01 RX ADMIN — FLUCONAZOLE 100 MG: 200 TABLET ORAL at 09:24

## 2021-01-01 RX ADMIN — LEVOTHYROXINE SODIUM 88 MCG: 0.09 TABLET ORAL at 05:20

## 2021-01-01 RX ADMIN — INSULIN LISPRO 4 UNITS: 100 INJECTION, SOLUTION INTRAVENOUS; SUBCUTANEOUS at 08:59

## 2021-01-01 RX ADMIN — GLYCOPYRROLATE AND FORMOTEROL FUMARATE 2 PUFF: 9; 4.8 AEROSOL, METERED RESPIRATORY (INHALATION) at 08:40

## 2021-01-01 RX ADMIN — Medication 10 ML: at 09:26

## 2021-01-01 RX ADMIN — INSULIN GLARGINE 40 UNITS: 100 INJECTION, SOLUTION SUBCUTANEOUS at 22:26

## 2021-01-01 RX ADMIN — Medication 2 PUFF: at 11:59

## 2021-01-01 RX ADMIN — IPRATROPIUM BROMIDE AND ALBUTEROL 1 PUFF: 20; 100 SPRAY, METERED RESPIRATORY (INHALATION) at 08:28

## 2021-01-01 RX ADMIN — LEVOTHYROXINE SODIUM 88 MCG: 0.09 TABLET ORAL at 06:17

## 2021-01-01 RX ADMIN — CEFEPIME HYDROCHLORIDE 2000 MG: 2 INJECTION, POWDER, FOR SOLUTION INTRAVENOUS at 14:04

## 2021-01-01 RX ADMIN — SODIUM CHLORIDE 1000 ML: 9 INJECTION, SOLUTION INTRAVENOUS at 23:35

## 2021-01-01 RX ADMIN — VANCOMYCIN HYDROCHLORIDE 1000 MG: 1 INJECTION, POWDER, LYOPHILIZED, FOR SOLUTION INTRAVENOUS at 01:00

## 2021-01-01 RX ADMIN — POTASSIUM CHLORIDE 10 MEQ: 7.46 INJECTION, SOLUTION INTRAVENOUS at 10:24

## 2021-01-01 RX ADMIN — DEXTROSE MONOHYDRATE 25 G: 25 INJECTION, SOLUTION INTRAVENOUS at 03:23

## 2021-01-01 RX ADMIN — POTASSIUM CHLORIDE 40 MEQ: 1500 TABLET, EXTENDED RELEASE ORAL at 17:17

## 2021-01-01 RX ADMIN — Medication 10 ML: at 08:51

## 2021-01-01 RX ADMIN — INSULIN LISPRO 2 UNITS: 100 INJECTION, SOLUTION INTRAVENOUS; SUBCUTANEOUS at 17:53

## 2021-01-01 RX ADMIN — EPINEPHRINE 1 MG: 0.1 INJECTION INTRACARDIAC; INTRAVENOUS at 03:36

## 2021-01-01 RX ADMIN — ATROPINE SULFATE 1 MG: 0.1 INJECTION, SOLUTION INTRAVENOUS at 03:10

## 2021-01-01 RX ADMIN — GLYCOPYRROLATE AND FORMOTEROL FUMARATE 2 PUFF: 9; 4.8 AEROSOL, METERED RESPIRATORY (INHALATION) at 08:24

## 2021-01-01 RX ADMIN — INSULIN LISPRO 2 UNITS: 100 INJECTION, SOLUTION INTRAVENOUS; SUBCUTANEOUS at 18:27

## 2021-01-01 RX ADMIN — FLUCONAZOLE 100 MG: 200 TABLET ORAL at 09:26

## 2021-01-01 RX ADMIN — PREDNISONE 20 MG: 20 TABLET ORAL at 08:57

## 2021-01-01 RX ADMIN — ESCITALOPRAM OXALATE 10 MG: 10 TABLET ORAL at 09:22

## 2021-01-01 RX ADMIN — INSULIN GLARGINE 40 UNITS: 100 INJECTION, SOLUTION SUBCUTANEOUS at 23:12

## 2021-01-01 RX ADMIN — GLYCOPYRROLATE AND FORMOTEROL FUMARATE 2 PUFF: 9; 4.8 AEROSOL, METERED RESPIRATORY (INHALATION) at 20:05

## 2021-01-01 RX ADMIN — Medication 10 ML: at 21:13

## 2021-01-01 RX ADMIN — INSULIN LISPRO 6 UNITS: 100 INJECTION, SOLUTION INTRAVENOUS; SUBCUTANEOUS at 10:08

## 2021-01-01 RX ADMIN — IPRATROPIUM BROMIDE AND ALBUTEROL 1 PUFF: 20; 100 SPRAY, METERED RESPIRATORY (INHALATION) at 21:20

## 2021-01-01 ASSESSMENT — PAIN SCALES - GENERAL
PAINLEVEL_OUTOF10: 0
PAINLEVEL_OUTOF10: 2
PAINLEVEL_OUTOF10: 0

## 2021-01-01 ASSESSMENT — ENCOUNTER SYMPTOMS
ABDOMINAL DISTENTION: 0
CONSTIPATION: 0
DIARRHEA: 0
DIARRHEA: 1
RHINORRHEA: 0
APNEA: 0
SORE THROAT: 0
WHEEZING: 0
COUGH: 1
ABDOMINAL PAIN: 0
SHORTNESS OF BREATH: 1
NAUSEA: 0
COUGH: 1
ABDOMINAL PAIN: 0
CHOKING: 0
ANAL BLEEDING: 0
WHEEZING: 0
BLOOD IN STOOL: 0
VOICE CHANGE: 0
BACK PAIN: 0
SINUS PRESSURE: 0
SHORTNESS OF BREATH: 1
RHINORRHEA: 0
VOMITING: 0
CHEST TIGHTNESS: 1
STRIDOR: 0

## 2021-01-01 ASSESSMENT — PAIN - FUNCTIONAL ASSESSMENT: PAIN_FUNCTIONAL_ASSESSMENT: ACTIVITIES ARE NOT PREVENTED

## 2021-01-01 ASSESSMENT — PAIN DESCRIPTION - ONSET: ONSET: ON-GOING

## 2021-01-01 ASSESSMENT — PAIN DESCRIPTION - LOCATION: LOCATION: GENERALIZED

## 2021-01-01 ASSESSMENT — PAIN DESCRIPTION - PAIN TYPE: TYPE: ACUTE PAIN

## 2021-03-23 NOTE — PROGRESS NOTES
Laurie Anderson    YOB: 1945     Date of Service:  3/23/2021     Chief Complaint   Patient presents with    New Patient     REF BY DR Janette Alvarez COPD         HPI patient referred for consultation by Dr. Shadi Rhodes for evaluation of COPD. Patient has been accompanied by his wife to our office today. Complains of shortness of breath for several years, progressively worse over the last year or so. He states that he has noticed a lump in the lower part of the sternum post CABG performed in 2016. He states that this is sometimes reducible on pressure. On one occasion last year it popped open with extrusion of pus and blood-after which he felt \"better\" with his breathing. He feels a pressure in the chest due to the lump and finds it hard to breathe. Denies any tenderness over the lump. Has history of chronic dyspnea, increased fatigue and somnolence with poor appetite. Has cachexia. Has history of COPD ? Severe-on Trelegy and albuterol inhaler. Uses 3 L O2 continuously, has POC. He previously lived near [place], [place] and recently moved to [place] in January 2019. History of coronary artery disease status post CABG x4 in 2016 at [facility]. He worked as a  for 35 years with history of exposure to asbestos. Smoker with at least 45-pack-year history, quit smoking in 2001.     Allergies   Allergen Reactions    Dilaudid [Hydromorphone Hcl]     Morphine      Outpatient Medications Marked as Taking for the 3/23/21 encounter (Office Visit) with Bird Good MD   Medication Sig Dispense Refill    fluticasone-umeclidin-vilant (TRELEGY ELLIPTA) 100-62.5-25 MCG/INH AEPB Inhale 1 puff into the lungs daily      levothyroxine (SYNTHROID) 88 MCG tablet       zolpidem (AMBIEN) 5 MG tablet zolpidem 5 mg tablet      aspirin 325 MG tablet Take 325 mg by mouth daily      naproxen sodium (ALEVE) 220 MG tablet Take 220 mg by mouth 2 times daily (with meals)      DULoxetine HCl (CYMBALTA PO) Take by mouth      METFORMIN HCL PO Take by mouth      Insulin NPH Isophane & Regular (NOVOLIN 70/30 FLEXPEN) (70-30) 100 UNIT per ML injection pen Inject into the skin 2 times daily           There is no immunization history on file for this patient. Past Medical History:   Diagnosis Date    [facility]chronic obstructive [facility] disease) ([facility] [facility]     Depressive disorder     Diabetes mellitus ([facility] [facility]      Past Surgical History:   Procedure Laterality Date    CARDIAC SURGERY      CHOLECYSTECTOMY       History reviewed. No pertinent family history. Review of Systems:  Review of Systems   Constitutional: Positive for fatigue. Negative for activity change, appetite change and fever. HENT: Negative for congestion, ear discharge, ear pain, postnasal drip, rhinorrhea, sinus pressure, sneezing, sore throat, tinnitus and voice change. Respiratory: Positive for cough, chest tightness and shortness of breath. Negative for apnea, choking, wheezing and stridor. Cardiovascular: Negative for chest pain, palpitations and leg swelling. Gastrointestinal: Negative for abdominal distention, abdominal pain, anal bleeding, blood in stool, constipation and diarrhea. Musculoskeletal: Negative for arthralgias, back pain and gait problem. Skin: Negative for pallor and rash. Allergic/Immunologic: Negative for environmental allergies. Neurological: Negative for dizziness, tremors, seizures, syncope, speech difficulty, weakness, light-headedness, numbness and headaches. Hematological: Negative for adenopathy. Does not bruise/bleed easily. Psychiatric/Behavioral: Negative for sleep disturbance. Vitals:    03/23/21 1359   BP: 110/62   Pulse: 63   SpO2: 90%   Weight: 185 lb (83.9 kg)   Height: 5' 10\" (1.778 m)     No flowsheet data found. Body mass index is 26.54 kg/m².      Wt Readings from Last 3 Encounters:   03/23/21 185 lb (83.9 kg)   10/19/20 185 lb (83.9 kg) probably has prior CABG related wound dehiscence/scar with fibrosis which is chronic and presenting as a lump. Doubt that the wound is currently infected. Will obtain CT chest with IV contrast to evaluate the extent of the lump. Would likely benefit from CT surgery evaluation-would await for CT imaging first before referral would be made. Discussed with patient about this. Patient possibly has severe COPD based on smoking history and is chronic O2 needs. Also concerns for fibrotic lung disease/asbestosis. Would await for CT imaging which would also give us information about the lung parenchyma. Check complete PFT study to assess for the degree of obstructive airway disease. Patient is currently on 3 L O2. Increased somnolence could be related to chronic hypercapnia, check ABG. Currently on Trelegy and albuterol inhaler-which we will continue. Return in about 6 weeks (around 5/4/2021).

## 2021-04-14 PROBLEM — J96.21 ACUTE ON CHRONIC RESPIRATORY FAILURE WITH HYPOXEMIA (HCC): Status: ACTIVE | Noted: 2021-01-01

## 2021-04-14 NOTE — ED PROVIDER NOTES
[address] [facility]        Pt Name: Madison Carias  MRN: [id number]  Birthdate 1945  Date of evaluation: 4/14/2021  Provider: Alley Bustillo PA-C  PCP: Kota Mclaughlin MD     I have seen and evaluated this patient with my supervising physician Duncan Cool, *. CHIEF COMPLAINT       Chief Complaint   Patient presents with    Shortness of Breath     PT arrived via EMS from home with c/o SOB for a while       HISTORY OF PRESENT ILLNESS   (Location, Timing/Onset, Context/Setting, Quality, Duration, Modifying Factors, Severity, Associated Signs and Symptoms)  Note limiting factors. Madison Carias is a 67 y.o. male who presents for evaluation of shortness of breath. Patient has history of COPD and is on 3 L nasal cannula oxygen at baseline. States he has been feeling more short of breath over the past 4 to 5 weeks. States he has also had nonbloody diarrheal stools. No abdominal pain nausea vomiting. No fevers or chills. States the cough is productive. No known sick contacts with similar symptoms or known Covid exposures. No recent steroid use or admissions for respiratory difficulties. He has no other complaints or concerns at this time. Nursing Notes were all reviewed and agreed with or any disagreements were addressed in the HPI. REVIEW OF SYSTEMS    (2-9 systems for level 4, 10 or more for level 5)     Review of Systems   Constitutional: Negative for appetite change, chills and fever. HENT: Negative for congestion and rhinorrhea. Respiratory: Positive for cough and shortness of breath. Negative for wheezing. Cardiovascular: Negative for chest pain. Gastrointestinal: Positive for diarrhea. Negative for abdominal pain, nausea and vomiting. Endocrine: Positive for polydipsia. Genitourinary: Negative for difficulty urinating, dysuria and hematuria.    Musculoskeletal: Negative for neck pain and neck stiffness. Skin: Negative for rash. Neurological: Negative for headaches. Positives and Pertinent negatives as per HPI. Except as noted above in the ROS, all other systems were reviewed and negative. PAST MEDICAL HISTORY     Past Medical History:   Diagnosis Date    [facility] obstructive [facility] disease) [facility] [address] [facility]     Depressive disorder     Diabetes mellitus ([facility] [facility]          SURGICAL HISTORY     Past Surgical History:   Procedure Laterality Date    CARDIAC SURGERY      CHOLECYSTECTOMY           CURRENTMEDICATIONS       Previous Medications    ASPIRIN 325 MG TABLET    Take 325 mg by mouth daily    DULOXETINE HCL (CYMBALTA PO)    Take by mouth    FLUTICASONE-UMECLIDIN-VILANT (TRELEGY ELLIPTA) 100-62.5-25 MCG/INH AEPB    Inhale 1 puff into the lungs daily    INSULIN NPH ISOPHANE & REGULAR (NOVOLIN 70/30 FLEXPEN) (70-30) 100 UNIT PER ML INJECTION PEN    Inject into the skin 2 times daily    LEVOTHYROXINE (SYNTHROID) 88 MCG TABLET        METFORMIN HCL PO    Take by mouth    NAPROXEN SODIUM (ALEVE) 220 MG TABLET    Take 220 mg by mouth 2 times daily (with meals)    ZOLPIDEM (AMBIEN) 5 MG TABLET    zolpidem 5 mg tablet         ALLERGIES     Dilaudid [hydromorphone hcl] and Morphine    FAMILYHISTORY     No family history on file. SOCIAL HISTORY       Social History     Tobacco Use    Smoking status: Former Smoker    Smokeless tobacco: Never Used   Substance Use Topics    Alcohol use: Not Currently    Drug use: Not on file       SCREENINGS             PHYSICAL EXAM    (up to 7 for level 4, 8 or more for level 5)     ED Triage Vitals [04/14/21 1356]   BP Temp Temp Source Pulse Resp SpO2 Height Weight   134/70 97.2 °F (36.2 °C) Infrared 75 16 91 % -- 185 lb (83.9 kg)       Physical Exam  Vitals signs and nursing note reviewed. Constitutional:       General: He is not in acute distress. Appearance: He is well-developed. He is ill-appearing. He is not toxic-appearing or diaphoretic.    HENT: Head: Normocephalic and atraumatic. Right Ear: External ear normal.      Left Ear: External ear normal.      Nose: Nose normal.      Mouth/Throat:      Mouth: Mucous membranes are dry. Pharynx: Oropharynx is clear. No oropharyngeal exudate. Eyes:      General:         Right eye: No discharge. Left eye: No discharge. Neck:      Musculoskeletal: Normal range of motion and neck supple. Cardiovascular:      Rate and Rhythm: Normal rate and regular rhythm. Heart sounds: Normal heart sounds. Pulmonary:      Effort: Pulmonary effort is normal. No tachypnea, accessory muscle usage or respiratory distress. Breath sounds: No rhonchi or rales. Chest:      Chest wall: No tenderness. Abdominal:      General: There is no distension. Palpations: Abdomen is soft. Tenderness: There is no abdominal tenderness. Musculoskeletal: Normal range of motion. Skin:     General: Skin is warm and dry. Neurological:      Mental Status: He is alert and oriented to person, place, and time.    Psychiatric:         Behavior: Behavior normal.         DIAGNOSTIC RESULTS   LABS:    Labs Reviewed   CBC WITH AUTO DIFFERENTIAL - Abnormal; Notable for the following components:       Result Value    RBC 4.17 (*)     Hemoglobin 13.3 (*)     RDW 17.4 (*)     All other components within normal limits    Narrative:     Performed at:  [facility] [address], [place], [facility] [address]   Phone [phone]   COMPREHENSIVE METABOLIC PANEL - Abnormal; Notable for the following components:    Glucose 197 (*)     BUN 6 (*)     CREATININE 0.7 (*)     Calcium 7.8 (*)     Albumin 2.2 (*)     Albumin/Globulin Ratio 0.5 (*)     Total Bilirubin 1.2 (*)     Alkaline Phosphatase 186 (*)     ALT 45 (*)      (*)     All other components within normal limits    Narrative:     Performed at:  [facility]  [address] [facility] [address] note:    CT ABDOMEN PELVIS W IV CONTRAST Additional Contrast? None   Preliminary Result   1. No CT evidence of a pulmonary embolism. 2. Stable chronic small left pleural effusion and chronic interstitial   pulmonary fibrosis. 3. Interval development of multiple new nonspecific consolidative and   somewhat nodular/masslike opacities throughout both lungs, right greater than   left, most likely reflecting a multifocal infectious or inflammatory process,   to include the possibility of atypical multifocal pneumonia. Metastatic foci   are considered less likely, though not excluded. 4. Stable mediastinal and bilateral hilar lymphadenopathy, which may be   benign and reactive in etiology or represent metastatic disease. 5. No acute process within the abdomen or pelvis. 6. Stable nonspecific calcified mesenteric masses unchanged from 02/13/2020,   though increased in size from 01/25/2018. Differential considerations   include granulomatous disease, carcinoid tumor, treated metastases, and   retractile mesenteritis. 7. Diffuse hepatic steatosis without evidence of biliary obstruction. 8. Chronic left renal cortical scarring, with stable bilateral renal cysts. 9. Nonobstructing right nephrolithiasis. 10. Patient status post median sternotomy, with interval development of   fracturing of the patient's lower mediastinal wires and new overlapping of   the median sternotomy limbs along the lower chest.  However, there is no   evidence of a sternal fluid collection or abscess. Clinical correlation is   advised. RECOMMENDATIONS:   Suggest appropriate clinical treatment, and short-term chest CT follow-up in   8-12 weeks to ensure resolution of the multifocal consolidative and   nodular/masslike opacities throughout both lungs to ensure their resolution,   as metastatic disease cannot be excluded. CT CHEST PULMONARY EMBOLISM W CONTRAST   Preliminary Result   1.  No CT evidence of a pulmonary embolism. 2. Stable chronic small left pleural effusion and chronic interstitial   pulmonary fibrosis. 3. Interval development of multiple new nonspecific consolidative and   somewhat nodular/masslike opacities throughout both lungs, right greater than   left, most likely reflecting a multifocal infectious or inflammatory process,   to include the possibility of atypical multifocal pneumonia. Metastatic foci   are considered less likely, though not excluded. 4. Stable mediastinal and bilateral hilar lymphadenopathy, which may be   benign and reactive in etiology or represent metastatic disease. 5. No acute process within the abdomen or pelvis. 6. Stable nonspecific calcified mesenteric masses unchanged from 02/13/2020,   though increased in size from 01/25/2018. Differential considerations   include granulomatous disease, carcinoid tumor, treated metastases, and   retractile mesenteritis. 7. Diffuse hepatic steatosis without evidence of biliary obstruction. 8. Chronic left renal cortical scarring, with stable bilateral renal cysts. 9. Nonobstructing right nephrolithiasis. 10. Patient status post median sternotomy, with interval development of   fracturing of the patient's lower mediastinal wires and new overlapping of   the median sternotomy limbs along the lower chest.  However, there is no   evidence of a sternal fluid collection or abscess. Clinical correlation is   advised. RECOMMENDATIONS:   Suggest appropriate clinical treatment, and short-term chest CT follow-up in   8-12 weeks to ensure resolution of the multifocal consolidative and   nodular/masslike opacities throughout both lungs to ensure their resolution,   as metastatic disease cannot be excluded. CT HEAD WO CONTRAST   Final Result   No acute intracranial abnormality. XR CHEST PORTABLE   Final Result   No acute cardiopulmonary disease.   Interstitial changes, likely chronic and   related to pulmonary fibrosis provider specified.     DISCHARGE MEDICATIONS:  New Prescriptions    No medications on file       DISCONTINUED MEDICATIONS:  Discontinued Medications    No medications on file              (Please note that portions of this note were completed with a voice recognition program.  Efforts were made to edit the dictations but occasionally words are mis-transcribed.)    Jennifer Hurley PA-C (electronically signed)           Martha Upton PA-C  04/14/21 [id number]

## 2021-04-14 NOTE — ED PROVIDER NOTES
[id number] [facility] Emergency Department      Pt Name: Jeffry Cullen  MRN: [id number]  [place] 1945  Date of evaluation: 4/14/2021  Provider: Ezekiel Sutherland MD  I independently performed a history and physical on Jeffry Cullen. All diagnostic, treatment, and disposition decisions were made by myself in conjunction with the advanced practice provider. HPI: Jeffry Cullen presented with   Chief Complaint   Patient presents with    Shortness of Breath     PT arrived via EMS from home with c/o SOB for a while     Jeffry Cullen has a past medical history of COPD (chronic obstructive pulmonary disease) ([facility] [address] Depressive disorder, and Diabetes mellitus ([facility] [address] He has a past surgical history that includes Cardiac surgery and Cholecystectomy. No current facility-administered medications on file prior to encounter.       Current Outpatient Medications on File Prior to Encounter   Medication Sig Dispense Refill    fluticasone-umeclidin-vilant (TRELEGY ELLIPTA) 100-62.5-25 MCG/INH AEPB Inhale 1 puff into the lungs daily      levothyroxine (SYNTHROID) 88 MCG tablet       zolpidem (AMBIEN) 5 MG tablet zolpidem 5 mg tablet      aspirin 325 MG tablet Take 325 mg by mouth daily      naproxen sodium (ALEVE) 220 MG tablet Take 220 mg by mouth 2 times daily (with meals)      DULoxetine HCl (CYMBALTA PO) Take by mouth      METFORMIN HCL PO Take by mouth      Insulin NPH Isophane & Regular (NOVOLIN 70/30 FLEXPEN) (70-30) 100 UNIT per ML injection pen Inject into the skin 2 times daily       PHYSICAL EXAM  Vitals: /68   Pulse 75   Temp 97.2 °F (36.2 °C) (Infrared)   Resp 16   Wt 185 lb (83.9 kg)   SpO2 100%   BMI 26.54 kg/m²   Constitutional:  67 y.o. male alert, cooperative, chronically ill and frail in appearance  HENT:  Atraumatic scalp, mucous membranes dry  Eyes:   Conjunctiva clear, no icterus  Neck:  Supple, no visible JVD, no signs of injury  Cardiovascular:  Regular, no rubs  Thorax & Lungs: some accessory muscle usage, decreased BS, deformity of left chest wall  Abdomen:  Soft, non distended, NT  Back:  No deformity  Genitalia:  Deferred  Rectal:  Deferred  Extremities:  No cyanosis, adequate perfusion  Skin:  Warm, dry  Neurologic:  Alert, no slurred speech  Psychiatric:  Affect appropriate    Medical Decision Making and Plan:  Briefly, this is an 67 y.o.male who presented with sob, worsening over time. No associated chest pain. Significant EKG changes noted, troponin elevation present. Other abnormalities in tested documented below. Plan is to admit for further care. For further details of [facility] Emergency Department encounter, please see documentation by advanced practice provider ZUNILDA Tomas.      Labs Reviewed   CBC WITH AUTO DIFFERENTIAL - Abnormal; Notable for the following components:       Result Value    RBC 4.17 (*)     Hemoglobin 13.3 (*)     RDW 17.4 (*)     All other components within normal limits    Narrative:     Performed at:                  [facility] [address], [facility] [employer]   Phone [phone]   COMPREHENSIVE METABOLIC PANEL - Abnormal; Notable for the following components:    Glucose 197 (*)     BUN 6 (*)     CREATININE 0.7 (*)     Calcium 7.8 (*)     Albumin 2.2 (*)     Albumin/Globulin Ratio 0.5 (*)     Total Bilirubin 1.2 (*)     Alkaline Phosphatase 186 (*)     ALT 45 (*)      (*)     All other components within normal limits    Narrative:     Performed at:                  [facility] [address], [facility] [employer]   Phone [phone]   TROPONIN - Abnormal; Notable for the following components:    Troponin 0.04 (*)     All other components within normal limits    Narrative:     Performed at:                  [facility] [address], [place], [facility] [employer]   Phone [phone]   PROTIME-INR - Abnormal; Notable for the following components:    Protime 13.7 (*)     INR 1.18 (*)     All other components within normal limits    Narrative:     Performed at:                  [facility] [address], [place], [address]   Phone [phone]  - Abnormal; Notable for the following components:    Pro-BNP 3,498 (*)     All other components within normal limits    Narrative:     Performed at:                  [facility] [address], [place], [address]   Phone [phone]   BLOOD GAS, VENOUS - Abnormal; Notable for the following components:    pH, Sukhdeep 7.555 (*)     pCO2, Sukhdeep 32.1 (*)     pO2, Sukhdeep 141.0 (*)     Base Excess, Sukhdeep 6.3 (*)     Carboxyhemoglobin 6.5 (*)     All other components within normal limits    Narrative:     Performed at:                  [facility] [address], [place], [address]   Phone [phone]   APTT    Narrative:     Performed at:                  [facility] [address], [place], [address]   Phone [phone]   D-DIMER, QUANTITATIVE     RADIOLOGY:     Plain x-rays were viewed by me:   CT ABDOMEN PELVIS W IV CONTRAST Additional Contrast? None   Final Result   1. No CT evidence of a pulmonary embolism. 2. Stable chronic small left pleural effusion and chronic interstitial   pulmonary fibrosis. 3. Interval development of multiple new nonspecific consolidative and   somewhat nodular/mass-like opacities throughout both lungs, right greater   than left, most likely reflecting a multifocal infectious or inflammatory   process, to include the possibility of atypical multifocal pneumonia. Metastatic foci are considered less likely, though not excluded.    4. Stable mediastinal and bilateral hilar lymphadenopathy, which may be   benign and reactive in etiology or represent metastatic disease. 5. No acute process within the abdomen or pelvis. 6. Stable nonspecific calcified mesenteric masses unchanged from 02/13/2020,   though increased in size from 01/25/2018. Differential considerations   include granulomatous disease, carcinoid tumor, treated metastases, and   retractile mesenteritis. 7. Diffuse hepatic steatosis without evidence of biliary obstruction. 8. Chronic left renal cortical scarring, with stable bilateral renal cysts. 9. Nonobstructing right nephrolithiasis. 10. Patient status post median sternotomy, with interval development of   fracturing of the patient's lower mediastinal wires and new overlapping of   the median sternotomy limbs along the lower chest.  However, there is no   evidence of a sternal fluid collection or abscess. Clinical correlation is   advised. RECOMMENDATIONS:   Suggest appropriate clinical treatment, and short-term chest CT follow-up in   8-12 weeks to ensure resolution of the multifocal consolidative and   nodular/mass-like opacities throughout both lungs, to ensure their   resolution, as metastatic disease cannot be excluded. CT CHEST PULMONARY EMBOLISM W CONTRAST   Final Result   1. No CT evidence of a pulmonary embolism. 2. Stable chronic small left pleural effusion and chronic interstitial   pulmonary fibrosis. 3. Interval development of multiple new nonspecific consolidative and   somewhat nodular/mass-like opacities throughout both lungs, right greater   than left, most likely reflecting a multifocal infectious or inflammatory   process, to include the possibility of atypical multifocal pneumonia. Metastatic foci are considered less likely, though not excluded. 4. Stable mediastinal and bilateral hilar lymphadenopathy, which may be   benign and reactive in etiology or represent metastatic disease. 5. No acute process within the abdomen or pelvis. 6. Stable nonspecific calcified mesenteric masses unchanged from 02/13/2020,   though increased in size from 01/25/2018. Differential considerations   include granulomatous disease, carcinoid tumor, treated metastases, and   retractile mesenteritis. 7. Diffuse hepatic steatosis without evidence of biliary obstruction. 8. Chronic left renal cortical scarring, with stable bilateral renal cysts. 9. Nonobstructing right nephrolithiasis. 10. Patient status post median sternotomy, with interval development of   fracturing of the patient's lower mediastinal wires and new overlapping of   the median sternotomy limbs along the lower chest.  However, there is no   evidence of a sternal fluid collection or abscess. Clinical correlation is   advised. RECOMMENDATIONS:   Suggest appropriate clinical treatment, and short-term chest CT follow-up in   8-12 weeks to ensure resolution of the multifocal consolidative and   nodular/mass-like opacities throughout both lungs, to ensure their   resolution, as metastatic disease cannot be excluded. CT HEAD WO CONTRAST   Final Result   No acute intracranial abnormality. XR CHEST PORTABLE   Final Result   No acute cardiopulmonary disease. Interstitial changes, likely chronic and   related to pulmonary fibrosis           EKG:  Read by me in the absence of a cardiologist shows: Narrow complex rhythm, rate 66, QRS duration normal, axis 57 degrees, ST-T wave abnormality in inferior and anterolateral leads that suggest ischemia, this appears new when compared to prior EKG from February 2020    CRITICAL CARE:   Total critical care time of 31 minutes (excludes any time for procedures). This includes but not limited to vital sign monitoring, medication, clinical response to medications, interpretation of diagnostics, review of nursing notes, pertinent record review, discussions about patient condition, consultation time, documentation time.   Critical care due to the patient's hypoxia, dyspnea. Vitals:    04/14/21 1415 04/14/21 1417 04/14/21 1430 04/14/21 1445   BP: 108/67  113/67 108/68   Pulse:       Resp:  16     Temp:       TempSrc:       SpO2: 100% 100% 100% 100%   Weight:         FINAL IMPRESSION:    1. Multifocal pneumonia    2. Acute electrocardiogram changes    3. Elevated troponin    4. Transaminitis    5. 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  Acute on chronic respiratory failure with hypoxia [facility])       DISPOSITION Admitted 04/14/2021 05:40:26 PM       Vandana Tello MD  04/15/21 [id number]

## 2021-04-15 PROBLEM — E83.42 HYPOMAGNESEMIA: Status: ACTIVE | Noted: 2021-01-01

## 2021-04-15 PROBLEM — J44.9 COPD (CHRONIC OBSTRUCTIVE PULMONARY DISEASE) (HCC): Status: ACTIVE | Noted: 2021-01-01

## 2021-04-15 PROBLEM — E78.00 HYPERCHOLESTEROLEMIA: Status: ACTIVE | Noted: 2021-01-01

## 2021-04-15 PROBLEM — R63.4 WEIGHT LOSS: Status: ACTIVE | Noted: 2021-01-01

## 2021-04-15 PROBLEM — E87.6 HYPOKALEMIA: Status: ACTIVE | Noted: 2021-01-01

## 2021-04-15 PROBLEM — K76.0 HEPATIC STEATOSIS: Status: ACTIVE | Noted: 2021-01-01

## 2021-04-15 PROBLEM — E11.9 TYPE 2 DIABETES MELLITUS (HCC): Status: ACTIVE | Noted: 2021-01-01

## 2021-04-15 PROBLEM — E88.09 HYPOALBUMINEMIA: Status: ACTIVE | Noted: 2021-01-01

## 2021-04-15 PROBLEM — E11.40 DIABETIC NEUROPATHY ASSOCIATED WITH TYPE 2 DIABETES MELLITUS (HCC): Status: ACTIVE | Noted: 2021-01-01

## 2021-04-15 PROBLEM — J18.9 COMMUNITY ACQUIRED PNEUMONIA: Status: ACTIVE | Noted: 2021-01-01

## 2021-04-15 NOTE — PROGRESS NOTES
4 Eyes Skin Assessment     NAME:  Gena Vasquez OF BIRTH:  1945  MEDICAL RECORD NUMBER:  [id number]    The patient is being assess for  Admission    I agree that 2 RN's have performed a thorough Head to Toe Skin Assessment on the patient. ALL assessment sites listed below have been assessed. Areas assessed by both nurses:    Head, Face, Ears, Shoulders, Back, Chest, Arms, Elbows, Hands, Sacrum. Buttock, Coccyx, Ischium and Legs. Feet and Heels        Does the Patient have a Wound? Yes wound(s) were present on assessment.  LDA wound assessment was Initiated and completed        Yeison Prevention initiated:  Yes   Wound Care Orders initiated:  No    Pressure Injury (Stage 3,4, Unstageable, DTI, NWPT, and Complex wounds) if present place consult order under [de-identified] No    New and Established Ostomies if present place consult order under : NA      Nurse 1 eSignature: Electronically signed by Catia Rhodes RN on 4/15/21 at 12:21 AM EDT    **SHARE this note so that the co-signing nurse is able to place an eSignature**    Nurse 2 eSignature: Electronically signed by Chandra Luu RN on 4/15/21 at 12:42 AM EDT

## 2021-04-15 NOTE — H&P
Department of Internal Medicine  History & Physical      SUBJECTIVE: The patient is a 67-year-old white male with past medical history significant for diabetes, hypercholesterolemia and obesity, the patient has been working on his diet, he has lost in 2 years about 200 pounds, recently he has lost more weight, within the last year he lost about 50 pounds, 20 pounds within the last 2 months, he presented to the ER with increased shortness of breath and cough, he is on 3 L of oxygen at home, he required 5 L on admission and now he is down to 3 L,     ALLERGIES:   Allergies   Allergen Reactions    Dilaudid [Hydromorphone Hcl]     Morphine       MEDICATIONS:   Prior to Admission medications    Medication Sig Start Date End Date Taking? Authorizing Provider   fluticasone-umeclidin-vilant (TRELEGY ELLIPTA) 100-62.5-25 MCG/INH AEPB Inhale 1 puff into the lungs daily    Historical Provider, MD   levothyroxine (SYNTHROID) 88 MCG tablet  1/3/20   Historical Provider, MD   zolpidem (AMBIEN) 5 MG tablet zolpidem 5 mg tablet    Historical Provider, MD   aspirin 325 MG tablet Take 325 mg by mouth daily    Historical Provider, MD   naproxen sodium (ALEVE) 220 MG tablet Take 220 mg by mouth 2 times daily (with meals)    Historical Provider, MD   DULoxetine HCl (CYMBALTA PO) Take by mouth    Historical Provider, MD   METFORMIN HCL PO Take by mouth    Historical Provider, MD   Insulin NPH Isophane & Regular (NOVOLIN 70/30 FLEXPEN) (70-30) 100 UNIT per ML injection pen Inject into the skin 2 times daily    Historical Provider, MD     [facility]   Past Medical History:   Diagnosis Date    COPD [facility]chronic obstructive [facility] disease) [facility]     Depressive disorder     Diabetes mellitus ([facility]       PSH:       Procedure Laterality Date    CARDIAC SURGERY      CHOLECYSTECTOMY        FAMILY HISTORY: No family history on file.    SOCIAL HISTORY:   Social History     Tobacco Use    Smoking status: Former Smoker    Smokeless tobacco: Never Used   Substance Use Topics    Alcohol use: Not Currently        REVIEW OF SYSTEMS: -   General ROS:denies any headache or weakness  Ophthalmic ROS: denies any blurred vision, double vision or vision loss  ENT ROS: denies any epistaxis, nasal discharge  Hematological and Lymphatic: denies any fatigue, night sweats, enlarge lymph nodes or bruising ,   Respiratory ROS: Has chronic shortness of breath and cough   Cardiovascular ROS: denies any chest pain, palpitations,  dizziness syncope or swelling in extremities  Gastrointestinal ROS: denies any abdominal pain, acid reflux, change in bowel habits or blood in stool, dark or tarry stools     Musculoskeletal ROS:denies any back pain or joint pain,  Neurological ROS: denies any mental status changes, seizures, memory loss, speech problems or muscle weakness in extremities   Dermatological ROS: denies any rash or skin lesions     OBJECTIVE:      PHYSICAL:   VITALS:  /74   Pulse 58   Temp 97.3 °F (36.3 °C) (Oral)   Resp 16   Ht 5' 8\" (1.727 m)   Wt 144 lb (65.3 kg)   SpO2 95%   BMI 21.90 kg/m²   PULSE OXIMETRY RANGE: SpO2  Av.8 %  Min: 87 %  Max: 100 %    Intake/Output Summary (Last 24 hours) at 4/15/2021 1454  Last data filed at 4/15/2021 0931  Gross per 24 hour   Intake 250 ml   Output --   Net 250 ml      CONSTITUTIONAL:  awake, alert, cooperative and no apparent distress  HEAD:  Normocephalic, atraumatic  HEENT:  ENT exam normal, no neck nodes or sinus tenderness  EYE: conjunctivae/corneas clear. PERRL, EOM's intact. Fundi benign.   NECK:  Supple, symmetrical, trachea midline, no adenopathy, thyroid symmetric, not enlarged and no tenderness, skin normal  LUNGS:  no increased work of breathing, decreased air exchange and clear to auscultation  CARDIOVASCULAR:  regular rate and rhythm  ABDOMEN:  No scars, normal bowel sounds, soft, non-distended, non-tender, no masses palpated, no hepatosplenomegally  MUSCULOSKELETAL:  There is no redness, warmth, or swelling of the joints. Full range of motion noted. Motor strength is 5 out of 5 all extremities bilaterally. Tone is normal.  NEUROLOGIC:  Awake, alert, oriented to name, place and time. Cranial nerves II-XII are grossly intact. Motor is 5 out of 5 bilaterally. Cerebellar finger to nose, heel to shin intact. Sensory is intact.   Babinski down going, Romberg negative, and gait is normal.  SKIN:  no bruising or bleeding  EDEMA: Normal    Data    Labs Reviewed   CBC WITH AUTO DIFFERENTIAL - Abnormal; Notable for the following components:       Result Value    RBC 4.17 (*)     Hemoglobin 13.3 (*)     RDW 17.4 (*)     All other components within normal limits    Narrative:     Performed at:  [facility] [address] [facility]  [place], [facility] [employer]   Phone [phone]   COMPREHENSIVE METABOLIC PANEL - Abnormal; Notable for the following components:    Glucose 197 (*)     BUN 6 (*)     CREATININE 0.7 (*)     Calcium 7.8 (*)     Albumin 2.2 (*)     Albumin/Globulin Ratio 0.5 (*)     Total Bilirubin 1.2 (*)     Alkaline Phosphatase 186 (*)     ALT 45 (*)      (*)     All other components within normal limits    Narrative:     Performed at:  [facility] [address],  [place], [facility] [employer]   Phone [phone]   TROPONIN - Abnormal; Notable for the following components:    Troponin 0.04 (*)     All other components within normal limits    Narrative:     Performed at:  [facility] [address] [facility]  [place], [facility] [employer]   Phone [phone]   PROTIME-INR - Abnormal; Notable for the following components:    Protime 13.7 (*)     INR 1.18 (*)     All other components within normal limits    Narrative:     Performed at:  [facility] [address], [place]   Phone [phone] - Abnormal; Notable for the following components:    Pro-BNP 3,498 (*)     All other components within normal limits    Narrative:     Performed at:  [facility] [address], [facility], [address] [employer]   Phone [phone]   BLOOD GAS, VENOUS - Abnormal; Notable for the following components:    pH, Sukhdeep 7.555 (*)     pCO2, Sukhdeep 32.1 (*)     pO2, Sukhdeep 141.0 (*)     Base Excess, Sukhdeep 6.3 (*)     Carboxyhemoglobin 6.5 (*)     All other components within normal limits    Narrative:     Performed at:  [facility] [address], [facility], [address] [place] [address]   Phone [phone]   D-DIMER, QUANTITATIVE - Abnormal; Notable for the following components:    D-Dimer, Quant 298 (*)     All other components within normal limits    Narrative:     Performed at:  [facility] [address], [facility], [facility]   Phone [phone]   CBC WITH AUTO DIFFERENTIAL - Abnormal; Notable for the following components:    RBC 3.94 (*)     Hemoglobin 12.5 (*)     Hematocrit 37.7 (*)     RDW 17.5 (*)     Lymphocytes Absolute 0.8 (*)     All other components within normal limits    Narrative:     Performed at:  [facility] [address], [facility], [address] [facility]   Phone [phone]   BASIC METABOLIC PANEL W/ REFLEX TO MG FOR LOW K - Abnormal; Notable for the following components:    Potassium reflex Magnesium 2.3 (*)     Glucose 240 (*)     CREATININE 0.6 (*)     Calcium 8.1 (*)     All other components within normal limits    Narrative:     Zulema Galo  SFF5T tel. [phone],  Chemistry results called to and read back by Christiano galeas, 04/15/2021  06:47, by IGNACIO ALBARADO| [facility]  Performed at:  [facility] [address], [facility], [address] [facility]   Phone [phone]   TROPONIN - Abnormal; Notable for the following components:    Troponin 0.05 (*)     All other components within normal limits    Narrative:     Collection has been rescheduled by Minus Been at 04/14/2021 22:13 Reason: Add   on  Performed at:  [facility] [address] [facility], [address] [employer]   Phone [phone]   MAGNESIUM - Abnormal; Notable for the following components:    Magnesium 1.40 (*)     All other components within normal limits    Narrative:     Gin Rao  F5T tel. [phone],  Chemistry results called to and read back by Anam galeas, 04/15/2021  06:47, by IGNACIO ALBARADO| . [facility]  Performed at:  [facility] [address] [facility]  [place], [address]   Phone [phone]   POTASSIUM - Abnormal; Notable for the following components:    Potassium 2.3 (*)     All other components within normal limits    Narrative:     Gin Rao  F5T tel. [phone],  Chemistry results called to and read back by georgiana hannon, 04/15/2021  08:31, by IGNACIO ALBARADO| . [facility]  Collection has been rescheduled by MARCE at 04/15/2021 07:59 Reason: Add   on per rn   Performed at:  [facility] [address] [facility] [address] [address] [employer]   Phone [phone]   POCT GLUCOSE - Abnormal; Notable for the following components:    POC Glucose 154 (*)     All other components within normal limits    Narrative:     Performed at:  [facility] [address] [facility], [address]   Phone [phone]   POCT GLUCOSE - Abnormal; Notable for the following components:    POC Glucose 198 (*)     All other components within normal limits    Narrative:     Performed at:  [facility] [address], [address]   Phone [phone]   POCT GLUCOSE - Abnormal; Notable for the following components:    POC Glucose 238 (*)     All other components within normal limits    Narrative:     Performed at:  [facility] [address] [facility],  [place], [address]   Phone [phone], RAPID    Narrative:     Performed at:  [facility]  [place] [address]   Yaneli Muller ,  [facility], [address]   Phone [phone]   GRAM STAIN   APTT    Narrative:     Performed at:  [facility]  [address] [facility], [address]   Phone [phone]   HEMOGLOBIN A1C    Narrative:     Collection has been rescheduled by Marnie Mcwilliams at 04/14/2021 22:13 Reason: Add   on  Performed at:  [facility]  [address] [facility], [place] [facility] [facility] [address]   Phone [phone]   PROCALCITONIN    Narrative:     Performed at:  [facility]  [address] [facility], [address]   Phone [phone]   POCT GLUCOSE   POCT GLUCOSE   POCT GLUCOSE   POCT GLUCOSE     CBC:   Lab Results   Component Value Date    WBC 4.2 04/15/2021    RBC 3.94 04/15/2021    HGB 12.5 04/15/2021    HCT 37.7 04/15/2021    MCV 95.7 04/15/2021    MCH 31.7 04/15/2021    MCHC 33.1 04/15/2021    RDW 17.5 04/15/2021     04/15/2021    MPV 8.9 04/15/2021     CMP:    Lab Results   Component Value Date     04/15/2021    K 2.3 04/15/2021    K 2.3 04/15/2021     04/15/2021    CO2 27 04/15/2021    BUN 7 04/15/2021    CREATININE 0.6 04/15/2021    GFRAA >60 04/15/2021    AGRATIO 0.5 04/14/2021    LABGLOM >60 04/15/2021    GLUCOSE 240 04/15/2021    PROT 6.8 04/14/2021    LABALBU 2.2 04/14/2021    CALCIUM 8.1 04/15/2021    BILITOT 1.2 04/14/2021    ALKPHOS 186 04/14/2021     04/14/2021    ALT 45 04/14/2021     Lab Results   Component Value Date    TROPONINI 0.05 (H) 04/14/2021         Imaging    CT CHEST PULMONARY EMBOLISM W CONTRAST [[id number]] Collected: 04/14/21 1559      Order Status: Completed Updated: 04/15/21 0113     Narrative:       EXAMINATION:   CT OF THE ABDOMEN AND PELVIS WITH CONTRAST; CTA OF THE CHEST 4/14/2021 3:36 pm     TECHNIQUE:   CT of the abdomen and pelvis was performed with the administration of   intravenous contrast. Multiplanar reformatted images are provided for review.    Dose modulation, which are most likely infectious or   inflammatory in etiology given their quick interval development since the   study of 02/13/2020. Gerhardt Perfect are primarily upper lobe predominant, with the   largest opacity measuring approximately 2.8 x 2.0 cm within the central right   upper lobe.  There is no evidence of a pneumothorax.  No definite new   pulmonary nodule or mass is seen. Mediastinum: The thyroid is unremarkable. Rosy Unger is no pathologically   enlarged supraclavicular or axillary lymphadenopathy. Rosy Unger does remain   mildly enlarged paratracheal, subcarinal, and bilateral hilar   lymphadenopathy, similar to the prior examination of 2/13/2020, with the   largest lymph node measuring approximately 2.7 x 1.9 cm within the right   hilar space.  There is atherosclerotic disease of the thoracic aorta, without   evidence of aneurysm.  There are post CABG changes evident.  There is native   coronary atherosclerosis.  No pericardial abnormality is identified. Organs: There is diffuse hepatic steatosis.  However, no discrete   intrahepatic nodule or mass is evident.  The portal vein is patent.  The   gallbladder appears surgically absent.  There is no evidence of intrahepatic   or extrahepatic biliary ductal dilatation.  The spleen is stable and   unremarkable.  The pancreas is atrophic, though otherwise unremarkable.  The   adrenal glands are normal bilaterally.  There is chronic left renal cortical   scarring with a chronic lobular contour of the left kidney.  There are   bilateral renal cysts, with the largest measuring approximately 2.1 cm within   the mid right kidney.  There is a 3 mm nonobstructing calculus within the mid   right kidney.  However, there is no evidence of a ureteral calculus or   hydronephrosis. GI/Bowel: Evaluation of the hollow GI tract demonstrates no evidence of   abnormal bowel wall thickening, dilatation, or evidence of obstruction. Pelvis:  The urinary bladder is distended, though otherwise normal.  The   prostate is stable and unremarkable. [place] is no free pelvic fluid or   pathologic pelvic lymphadenopathy. Peritoneum/Retroperitoneum: No intraperitoneal free air or free fluid is   identified.  No pathologic lymphadenopathy is seen. Quan [place] are persistent   nonspecific calcified mesenteric masses within the left upper and left lower   quadrants, with the left upper lobe mass measuring approximately 3.8 x 3.3 cm   on image 48 of series 8, and the left lower quadrant mass measuring 3.4 x 2.0   cm on image 110 of series 8.  These are similar in comparison with the study   of 2/13/2020, though have increased in size from the study of 11/25/2018. There is atherosclerotic disease of the abdominal aorta, without evidence of   aneurysm.  No significant abdominal wall hernia is evident. Bones/Soft Tissues: There appears to be fracturing of the lower sternal   wires, with new overlapping of the leftward half of the lower sternotomy   lying anterior to the rightward half of the sternotomy.  However, there is no   walled-off fluid collection or abscess at the sternotomy site. Quan [place] is   diffuse osteopenia.  There is multilevel degenerative change throughout the   thoracolumbar spine.  No osteolytic or osteoblastic lesion is evident.      Impression:       1. No CT evidence of a pulmonary embolism. 2. Stable chronic small left pleural effusion and chronic interstitial   pulmonary fibrosis. 3. Interval development of multiple new nonspecific consolidative and   somewhat nodular/mass-like opacities throughout both lungs, right greater   than left, most likely reflecting a multifocal infectious or inflammatory   process, to include the possibility of atypical multifocal pneumonia. Metastatic foci are considered less likely, though not excluded. 4. Stable mediastinal and bilateral hilar lymphadenopathy, which may be   benign and reactive in etiology or represent metastatic disease.    5. based adjustment of the mA/kV was utilized to   reduce the radiation dose to as low as reasonably achievable. COMPARISON:   2/13/2020, 11/25/2018     HISTORY:   ORDERING SYSTEM PROVIDED HISTORY: weight loss abnormal LFTs   TECHNOLOGIST PROVIDED HISTORY:   Reason for exam:->weight loss abnormal LFTs   Additional Contrast?->None   Decision Support Exception->Emergency Medical Condition (MA)   Reason for Exam: weight loss abnormal LFTs   Acuity: Unknown   Type of Exam: Unknown;     ORDERING SYSTEM PROVIDED HISTORY: sob, pulmonary fibrosis   TECHNOLOGIST PROVIDED HISTORY:   Reason for exam:->sob, pulmonary fibrosis   Decision Support Exception->Emergency Medical Condition (MA)   Reason for Exam: sob, pulmonary fibrosis   Acuity: Unknown   Type of Exam: Unknown     FINDINGS:   Pulmonary arteries: The pulmonary arteries are patent, without evidence of   filling defect to suggest a pulmonary embolism.  The main pulmonary artery is   at the upper limits of normal in caliber. Lungs/pleura: There are mild retained mucus secretions within the upper   thoracic trachea.  The central airways are otherwise patent.  There is mild   diffuse cylindrical bronchiectasis, similar to the prior examination. Josiephine Payer   is chronic moderate interstitial pulmonary fibrosis in a classic UIP pattern. There is a stable chronic small left pleural effusion. Josiephine Payer is chronic   biapical pleural and parenchymal scarring. Josiephine Payer has been interval   development of multiple new curvilinear and somewhat nodular consolidative   opacities throughout both lungs, which are most likely infectious or   inflammatory in etiology given their quick interval development since the   study of 02/13/2020.  These are primarily upper lobe predominant, with the   largest opacity measuring approximately 2.8 x 2.0 cm within the central right   upper lobe.  There is no evidence of a pneumothorax.  No definite new   pulmonary nodule or mass is seen.      Mediastinum: upper lobe mass measuring approximately 3.8 x 3.3 cm   on image 48 of series 8, and the left lower quadrant mass measuring 3.4 x 2.0   cm on image 110 of series 8.  These are similar in comparison with the study   of 2/13/2020, though have increased in size from the study of 11/25/2018. There is atherosclerotic disease of the abdominal aorta, without evidence of   aneurysm.  No significant abdominal wall hernia is evident. Bones/Soft Tissues: There appears to be fracturing of the lower sternal   wires, with new overlapping of the leftward half of the lower sternotomy   lying anterior to the rightward half of the sternotomy.  However, there is no   walled-off fluid collection or abscess at the sternotomy site. Deshaun Hoyles is   diffuse osteopenia.  There is multilevel degenerative change throughout the   thoracolumbar spine.  No osteolytic or osteoblastic lesion is evident.      Impression:       1. No CT evidence of a pulmonary embolism. 2. Stable chronic small left pleural effusion and chronic interstitial   pulmonary fibrosis. 3. Interval development of multiple new nonspecific consolidative and   somewhat nodular/mass-like opacities throughout both lungs, right greater   than left, most likely reflecting a multifocal infectious or inflammatory   process, to include the possibility of atypical multifocal pneumonia. Metastatic foci are considered less likely, though not excluded. 4. Stable mediastinal and bilateral hilar lymphadenopathy, which may be   benign and reactive in etiology or represent metastatic disease. 5. No acute process within the abdomen or pelvis. 6. Stable nonspecific calcified mesenteric masses unchanged from 02/13/2020,   though increased in size from 01/25/2018.  Differential considerations   include granulomatous disease, carcinoid tumor, treated metastases, and   retractile mesenteritis. 7. Diffuse hepatic steatosis without evidence of biliary obstruction.    8. Chronic left renal cortical scarring, with stable bilateral renal cysts. 9. Nonobstructing right nephrolithiasis. 10. Patient status post median sternotomy, with interval development of   fracturing of the patient's lower mediastinal wires and new overlapping of   the median sternotomy limbs along the lower chest.  However, there is no   evidence of a sternal fluid collection or abscess.  Clinical correlation is   advised. RECOMMENDATIONS:   Suggest appropriate clinical treatment, and short-term chest CT follow-up in   8-12 weeks to ensure resolution of the multifocal consolidative and   nodular/mass-like opacities throughout both lungs, to ensure their   resolution, as metastatic disease cannot be excluded.      CT HEAD WO CONTRAST [[id number]] Collected: 04/14/21 1557     Order Status: Completed Updated: 04/14/21 1604     Narrative:       EXAMINATION:   CT OF THE HEAD WITHOUT CONTRAST  4/14/2021 3:35 pm     TECHNIQUE:   CT of the head was performed without the administration of intravenous   contrast. Dose modulation, iterative reconstruction, and/or weight based   adjustment of the mA/kV was utilized to reduce the radiation dose to as low   as reasonably achievable. COMPARISON:   10/19/2020     HISTORY:   ORDERING SYSTEM PROVIDED HISTORY: head injury, memory change   TECHNOLOGIST PROVIDED HISTORY:   Has a \"code stroke\" or \"stroke alert\" been called? ->No   Reason for exam:->head injury, memory change   Decision Support Exception->Emergency Medical Condition (MA)   Reason for Exam: head injury, memory change; head injury   Acuity: Unknown   Type of Exam: Unknown     FINDINGS:   BRAIN/VENTRICLES: There is no acute intracranial hemorrhage, mass effect or   midline shift.  No abnormal extra-axial fluid collection.  The gray-white   differentiation is maintained without evidence of an acute infarct.  There is   no evidence of hydrocephalus.      ORBITS: The visualized portion of the orbits demonstrate no acute abnormality. SINUSES: Opacification of the right mastoid air cells. SOFT TISSUES/SKULL:  No acute abnormality of the visualized skull or soft   tissues.      Impression:       No acute intracranial abnormality.      XR CHEST PORTABLE [[id number]] Collected: 04/14/21 1454     Order Status: Completed Updated: 04/14/21 1500     Narrative:       EXAMINATION:   ONE XRAY VIEW OF THE CHEST     4/14/2021 2:29 pm     COMPARISON:   Chest CT 02/13/2020. HISTORY:   ORDERING SYSTEM PROVIDED HISTORY: SOB   TECHNOLOGIST PROVIDED HISTORY:   Reason for exam:->SOB   Reason for Exam: c/o SOB for a while   Acuity: Chronic   Type of Exam: Ongoing     FINDINGS:   The cardiomediastinal silhouette is unremarkable status post median   sternotomy.  Coarsened interstitial markings throughout the lungs with mild   pleural thickening laterally on the left.  Findings are similar on the   previous CT in suggests interstitial pulmonary fibrosis.  No superimposed   acute infiltrate, significant free pleural fluid or evidence of overt failure.      Impression:       No acute cardiopulmonary disease.  Interstitial changes, likely chronic and   related to pulmonary fibrosis            ASSESSMENT      Patient Active Problem List   Diagnosis    Acute on chronic respiratory failure with hypoxemia (HCC)    Weight loss    Type 2 diabetes mellitus ([address]    Diabetic neuropathy associated with type 2 diabetes mellitus ([address]    COPD (chronic obstructive pulmonary disease) (HCC)    Hepatic steatosis    Hypercholesterolemia    Hypokalemia    Hypomagnesemia    Hypoalbuminemia    Community acquired pneumonia         PLAN  1. The patient was admitted to medical floor  2. The patient was started empirically on Rocephin and azithromycin for community-acquired pneumonia  3. Blood and sputum culture  4. The patient was progressive weight loss, hypoalbuminemia, recommended dietitian consults, the patient was started on Glucerna  5.  Resume home medication except metformin secondary to diarrhea  6. Recommended PT and OT to evaluate and treat  7. The patient was started on Solu-Medrol, discontinue and he will be started on prednisone 20 mg twice a day  8. Lovenox for DVT prophylaxis  9.  Potassium and magnesium replacement and recheck labs in the morning

## 2021-04-15 NOTE — ED NOTES
PT medicated as ordered. Ready to covid swab PT, wife updated that she would have to leave, PT requesting to leave AMA. PT and wife having a disagreement. Wife insisting that PT not be allowed to leave. Wife informed that PT has that right. Admitting MD contacted. Wife continues to come to RN to state  is staying regardless. I let wife know that this is not a discussion that I am able to make a decision on due to the PT being totally competent enough to make his own medical decisions. Wife continues to come out of room and asked to return to room. She states she needs to take a break. I let her know she is more that welcome to take a break it would just have to take place outside the main entrance, our policy does not allow family and patients to loiter in berg ways due to HIPAA and Covid  Multiple PD and EMS here at this time.       Adolph Loaiza RN  04/14/21 2002

## 2021-04-15 NOTE — CONSULTS
[facility] Pulmonary and Critical Care   Consult Note      Reason for Consult: Shortness of breath, pneumonia  Requesting Physician: Dr Melanie Dawson:   [address] [facility]:                The patient is a 67 y.o. male with significant past medical history of:      Diagnosis Date    COPD (chronic obstructive pulmonary disease) ([facility] [place]     Depressive disorder     Diabetes mellitus ([facility]      Patient presents to the emergency department with a chief complaint of increasingly severe shortness of breath over the preceding 4 or 5 weeks. He admits to some moderate associated cough. He denies fevers or chills. No contact with sick people. He has had his Covid vaccine.       Past Surgical History:        Procedure Laterality Date    CARDIAC SURGERY      CHOLECYSTECTOMY       Current Medications:    Current Facility-Administered Medications: potassium chloride 10 mEq/100 mL IVPB (Peripheral Line), 10 mEq, Intravenous, PRN  magnesium sulfate 2000 mg in 50 mL IVPB premix, 2,000 mg, Intravenous, Once  aspirin tablet 325 mg, 325 mg, Oral, Daily  DULoxetine (CYMBALTA) extended release capsule 60 mg, 60 mg, Oral, Daily  levothyroxine (SYNTHROID) tablet 88 mcg, 88 mcg, Oral, Daily  zolpidem (AMBIEN) tablet 5 mg, 5 mg, Oral, Nightly PRN  0.9 % sodium chloride infusion, , Intravenous, Continuous  sodium chloride flush 0.9 % injection 5-40 mL, 5-40 mL, Intravenous, 2 times per day  sodium chloride flush 0.9 % injection 5-40 mL, 5-40 mL, Intravenous, PRN  0.9 % sodium chloride infusion, 25 mL, Intravenous, PRN  enoxaparin (LOVENOX) injection 40 mg, 40 mg, Subcutaneous, Daily  promethazine (PHENERGAN) tablet 12.5 mg, 12.5 mg, Oral, Q6H PRN **OR** ondansetron (ZOFRAN) injection 4 mg, 4 mg, Intravenous, Q6H PRN  polyethylene glycol (GLYCOLAX) packet 17 g, 17 g, Oral, Daily PRN  acetaminophen (TYLENOL) tablet 650 mg, 650 mg, Oral, Q6H PRN **OR** acetaminophen (TYLENOL) suppository 650 mg, 650 mg, Rectal, Q6H PRN  azithromycin (ZITHROMAX) 500 mg in D5W 250ml Vial Mate, 500 mg, Intravenous, Q24H **AND** cefTRIAXone (ROCEPHIN) 1000 mg in sterile water 10 mL IV syringe, 1,000 mg, Intravenous, Q24H  albuterol (PROVENTIL) nebulizer solution 2.5 mg, 2.5 mg, Nebulization, Q2H PRN  methylPREDNISolone sodium (SOLU-MEDROL) injection 40 mg, 40 mg, Intravenous, Q8H  glucose (GLUTOSE) 40 % oral gel 15 g, 15 g, Oral, PRN  dextrose 50 % IV solution, 12.5 g, Intravenous, PRN  glucagon (rDNA) injection 1 mg, 1 mg, Intramuscular, PRN  dextrose 5 % solution, 100 mL/hr, Intravenous, PRN  insulin glargine (LANTUS;BASAGLAR) injection pen 40 Units, 40 Units, Subcutaneous, Nightly  insulin lispro (1 Unit Dial) 0-12 Units, 0-12 Units, Subcutaneous, TID WC  insulin lispro (1 Unit Dial) 0-6 Units, 0-6 Units, Subcutaneous, Nightly  budesonide-formoterol (SYMBICORT) 160-4.5 MCG/ACT inhaler 2 puff, 2 puff, Inhalation, BID  tiotropium (SPIRIVA RESPIMAT) 2.5 MCG/ACT inhaler 2 puff, 2 puff, Inhalation, Daily    Allergies   Allergen Reactions    Dilaudid [Hydromorphone Hcl]     Morphine        Social History:    TOBACCO:   reports that he has quit smoking. He has never used smokeless tobacco.  ETOH:   reports previous alcohol use. Patient currently lives independently  Environmental/chemical exposure: None known    Family History:   No family history on file. REVIEW OF SYSTEMS:    CONSTITUTIONAL:  negative for fevers, chills, diaphoresis, activity change, appetite change, fatigue, night sweats and unexpected weight change.    EYES:  negative for blurred vision, eye discharge, visual disturbance and icterus  HEENT:  negative for hearing loss, tinnitus, ear drainage, sinus pressure, nasal congestion, epistaxis and snoring  RESPIRATORY:  See HPI  CARDIOVASCULAR:  negative for chest pain, palpitations, exertional chest pressure/discomfort, edema, syncope  GASTROINTESTINAL:  negative for nausea, vomiting, diarrhea, constipation, blood in stool and abdominal pain  GENITOURINARY:  negative for frequency, dysuria, urinary incontinence, decreased urine volume, and hematuria  HEMATOLOGIC/LYMPHATIC:  negative for easy bruising, bleeding and lymphadenopathy  ALLERGIC/IMMUNOLOGIC:  negative for recurrent infections, angioedema, anaphylaxis and drug reactions  ENDOCRINE:  negative for weight changes and diabetic symptoms including polyuria, polydipsia and polyphagia  MUSCULOSKELETAL:  negative for  pain, joint swelling, decreased range of motion and muscle weakness  NEUROLOGICAL:  negative for headaches, slurred speech, unilateral weakness  PSYCHIATRIC/BEHAVIORAL: negative for hallucinations, behavioral problems, confusion and agitation. Objective:   PHYSICAL EXAM:      VITALS:  /62   Pulse 58   Temp 97.1 °F (36.2 °C) (Oral)   Resp 18   Ht 5' 8\" (1.727 m)   Wt 144 lb (65.3 kg)   SpO2 100%   BMI 21.90 kg/m²      24HR INTAKE/OUTPUT:      Intake/Output Summary (Last 24 hours) at 4/15/2021 1006  Last data filed at 4/15/2021 0931  Gross per 24 hour   Intake 250 ml   Output --   Net 250 ml     CONSTITUTIONAL:  awake, alert, cooperative, no apparent distress, and appears stated age  NECK:  Supple, symmetrical, trachea midline, no adenopathy, thyroid symmetric, not enlarged and no tenderness, skin normal  LUNGS:  no increased work of breathing and faint basilar crackles to auscultation. No accessory muscle use  CARDIOVASCULAR: S1 and S2, no edema and no JVD  ABDOMEN:  normal bowel sounds, non-distended and no masses palpated, and no tenderness to palpation. No hepatospleenomegaly  LYMPHADENOPATHY:  no axillary or supraclavicular adenopathy. No cervical adnenopathy  PSYCHIATRIC: Oriented to person place and time. No obvious depression or anxiety. MUSCULOSKELETAL: No obvious misalignment or effusion of the joints. No clubbing, cyanosis of the digits. SKIN:  normal skin color, texture, turgor and no redness, warmth, or swelling.  No palpable nodules    DATA:    Old records have been reviewed    CBC:  Recent Labs     04/14/21  1409 04/15/21  0511   WBC 10.8 4.2   RBC 4.17* 3.94*   HGB 13.3* 12.5*   HCT 40.8 37.7*    186   MCV 97.8 95.7   MCH 31.8 31.7   MCHC 32.6 33.1   RDW 17.4* 17.5*      BMP:  Recent Labs     04/14/21  1409 04/15/21  0511 04/15/21  0810    141  --    K 4.7 2.3* 2.3*    103  --    CO2 27 27  --    BUN 6* 7  --    CREATININE 0.7* 0.6*  --    CALCIUM 7.8* 8.1*  --    GLUCOSE 197* 240*  --       ABG:  No results for input(s): PHART, VFB6IQE, PO2ART, RJT4QKT, X1WFSWTQ, BEART in the last 72 hours. Procalcitonin  No results for input(s): PROCAL in the last 72 hours. No results found for: BNP  Lab Results   Component Value Date    TROPONINI 0.05 (H) 04/14/2021           Radiology Review:  All pertinent images / reports were reviewed as a part of this visit. Assessment:     1. Acute hypoxemic respiratory failure  2. Community-acquired pneumonia  3. Pulmonary fibrosis  4. Bronchiectasis  5. COPD, severity to be determined      Plan:     I have reviewed laboratories, medical records and images for this visit  CT imaging reveals evidence of consolidation and groundglass opacity in a multifocal distribution. There is also fibrotic change noted. Fibrotic change has progressed comparative to imaging from 2020. Patient does have some stable mediastinal lymphadenopathy. There is bronchiectasis of the lower lobes. Since admission the patient has been afebrile. Does not demonstrate a leukocytosis. We will send procalcitonin  He is on Rocephin and azithromycin for community-acquired pneumonia  He is on Trelegy at home. Here receiving Symbicort and Spiriva. Also receiving IV Solu-Medrol 40 mg every 8 hours.   Will need follow-up imaging to document resolution of the infiltrative changes  Would benefit from outpatient pulmonary function testing  Did have an oxygen saturation in the 80s on room air  Now tolerating 3 L/min nasal cannula oxygen supplement with acceptable saturations

## 2021-04-15 NOTE — PLAN OF CARE
Problem: Falls - Risk of:  Goal: Will remain free from falls  Description: Will remain free from falls  4/15/2021 1023 by Young Puga RN  Outcome: Ongoing  Note: Pt remains free from falls. Safety precautions in place. Bed in lowest position, bed/chair wheels locked, call light with in reach, bedside table in reach, bed/chair alarm on, fall risk wrist band on, SAFE outside of doorway. Will continue to monitor.

## 2021-04-16 NOTE — PROGRESS NOTES
Occupational Therapy   Occupational Therapy Initial Assessment  Date: 2021   Patient Name: Vangie Wagner  MRN: [id number]     : 1945    Date of Service: 2021    Discharge Recommendations:Erick Unger scored a 17/24 on the AM-PAC ADL Inpatient form. Current research shows that an AM-PAC score of 17 or less is typically not associated with a discharge to the patient's home setting. Based on the patient's AM-PAC score and their current ADL deficits, it is recommended that the patient have 5-7 sessions per week of Occupational Therapy at d/c to increase the patient's independence. At this time, this patient demonstrates the endurance, and/or tolerance for 3 hours of therapy each day, with a treatment frequency of 5-7x/wk. Please see assessment section for further patient specific details. If patient discharges prior to next session this note will serve as a discharge summary. Please see below for the latest assessment towards goals. OT Equipment Recommendations  Equipment Needed: Yes  Mobility Devices: ADL Assistive Devices  ADL Assistive Devices: Shower Chair with back;Grab Bars - shower    Assessment   Performance deficits / Impairments: Decreased functional mobility ; Decreased cognition;Decreased safe awareness;Decreased endurance;Decreased balance;Decreased ROM; Decreased ADL status  Assessment: Patient presents with the above deficitts impacting occupational performance and functioning below baseline level. Patient with decreased safety, cognition, and balance placing him at risk for falls.   Treatment Diagnosis: Decreased functional mobility, ADLs, ROM, cognition, balance and endurance associated with acute on chronic respiratory hypoxemia  Prognosis: Good  Decision Making: Medium Complexity  OT Education: OT Role;Plan of Care  Patient Education: Patient is not an independent learner  Barriers to Learning: memory  REQUIRES OT FOLLOW UP: Yes  Activity Tolerance  Activity Tolerance: Treatment limited secondary to medical complications (free text)  Activity Tolerance: Patient O2 needs into mid 80's with mobility on 2L. Increased to 3L for recovery to mid 90's. Decreased after recovery to 2L sats >90%  Safety Devices  Safety Devices in place: Yes  Type of devices: Call light within reach; Chair alarm in place; Patient at risk for falls;Nurse notified;Gait belt;Left in chair           Patient Diagnosis(es): The primary encounter diagnosis was Multifocal pneumonia. 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 has a past medical history of COPD (chronic obstructive pulmonary disease) ([facility] Depressive disorder, and Diabetes mellitus ([facility] has a past surgical history that includes Cardiac surgery and Cholecystectomy. Treatment Diagnosis: Decreased functional mobility, ADLs, ROM, cognition, balance and endurance associated with acute on chronic respiratory hypoxemia      Restrictions  Restrictions/Precautions  Restrictions/Precautions: Fall Risk, Up as Tolerated, Modified Diet(Carb control diet, up as tolerated)  Position Activity Restriction  Other position/activity restrictions: Jacquie Rogers is a 67 y.o. male who presents for evaluation of shortness of breath. Patient has history of COPD and is on 3 L nasal cannula oxygen at baseline. States he has been feeling more short of breath over the past 4 to 5 weeks. States he has also had nonbloody diarrheal stools. No abdominal pain nausea vomiting. No fevers or chills. States the cough is productive. 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, CABG with overlapping sternotomy limbs  Family / Caregiver Present: No  Diagnosis: acute on chronic respiratory failure with To/from bathroom  Assist Level: Minimal assistance  Functional Mobility Comments: Unsteady gait pattern, decreased safety awareness of lines while ambulating  ADL  LE Dressing: Minimal assistance(Donned socks and pants (Min A) at EOB, Min A with balance due to posterior lean)  Tone RUE  RUE Tone: Normotonic  Tone LUE  LUE Tone: Normotonic  Coordination  Movements Are Fluid And Coordinated: No     Bed mobility  Supine to Sit: Stand by assistance  Sit to Supine: Unable to assess  Scooting: Stand by assistance  Transfers  Sit to stand: Minimal assistance  Stand to sit: Minimal assistance  Transfer Comments: Patient required min. verbal cues for hand placement during transfers     Cognition  Overall Cognitive Status: Exceptions  Arousal/Alertness: Appropriate responses to stimuli  Following Commands: Follows one step commands with repetition  Attention Span: Attends with cues to redirect  Memory: Decreased recall of recent events;Decreased short term memory;Decreased recall of biographical Information  Safety Judgement: Decreased awareness of need for safety  Problem Solving: Decreased awareness of errors;Assistance required to correct errors made  Insights: Decreased awareness of deficits  Initiation: Does not require cues(patient impulsive with mobility, decreased awareness of lines)  Cognition Comment: Patient presented with decrease cognitive functioning due to disorientation to place and time, gaps in memory recall, difficulty attending to task, and illogical conversation topics. Sensation  Overall Sensation Status: Impaired(intact LE, impaired UE-complains of intermittant numbness)        LUE PROM (degrees)  LUE PROM: WFL  LUE AROM (degrees)  LUE AROM : Exceptions  L Shoulder Flexion 0-180: Approx. 80 degrees  RUE PROM (degrees)  RUE PROM: Exceptions  R Shoulder Flex  0-180: Less than 90 degrees  RUE AROM (degrees)  RUE AROM : Exceptions  R Shoulder Flexion 0-180: Approx.  45 degrees

## 2021-04-16 NOTE — PROGRESS NOTES
Blood sugar 62 this am. Spoke with RN who is bringing 120 ml juice to the patient now. Dov Spring RN, BSN, PCCN.

## 2021-04-16 NOTE — DISCHARGE INSTR - COC
Continuity of Care Form    Patient Name: Dolores Steward   :  1945  MRN:  [id number]    Admit date:  2021  Discharge date:  21    Code Status Order: Full Code   Advance Directives:   [facility] Documentation       Date/Time Healthcare Directive Type of Healthcare Directive Copy in [address] Agent's Name Healthcare Agent's Phone Number    21 1066  Yes, patient has an advance directive for healthcare treatment  Durable power of  for health care;Living will  No, copy requested from family  Spouse  --  --            Admitting Physician:  Kelin Sánchez MD  PCP: Kelin Sánchez MD    Discharging Nurse: [address] [place] [address] Unit/Room#: 5KR-5413/4592-08  Discharging Unit Phone Number: [phone]    Emergency Contact:   Extended Emergency Contact Information  Primary Emergency Contact: Get Ramirez[address] Phone: [phone]  Work Phone: [phone]  Mobile Phone: [phone]  Relation: Spouse  Secondary Emergency Contact: Zawada,Chris   [address] Phone: [phone]  Relation: Child    Past Surgical History:  Past Surgical History:   Procedure Laterality Date    CARDIAC SURGERY      CHOLECYSTECTOMY         Immunization History: There is no immunization history on file for this patient.     Active Problems:  Patient Active Problem List   Diagnosis Code    Acute on chronic respiratory failure with hypoxemia (HCC) J96.21    Weight loss R63.4    Type 2 diabetes mellitus ([facility] E11.9    Diabetic neuropathy associated with type 2 diabetes mellitus (HCC) E11.40    COPD (chronic obstructive pulmonary disease) (HCC) J44.9    Hepatic steatosis K76.0    Hypercholesterolemia E78.00    Hypokalemia E87.6    Hypomagnesemia E83.42    Hypoalbuminemia E88.09    Community acquired pneumonia J18.9       Isolation/Infection:   Isolation            No Isolation Patient Infection Status       Infection Onset Added Last Indicated Last Indicated By Review Planned Expiration Resolved Resolved By    None active    Resolved    COVID-19 Rule Out 04/14/21 04/14/21 04/14/21 COVID-19 (Ordered)   04/14/21 Rule-Out Test Resulted            Nurse Assessment:  Last Vital Signs: /65   Pulse 72   Temp 98 °F (36.7 °C) (Axillary)   Resp 20   Ht 5' 8\" (1.727 m)   Wt 144 lb (65.3 kg)   SpO2 93%   BMI 21.90 kg/m²     Last documented pain score (0-10 scale): Pain Level: 0  Last Weight:   Wt Readings from Last 1 Encounters:   04/14/21 144 lb (65.3 kg)     Mental Status:  alert    IV Access:  - None    Nursing Mobility/ADLs:  Walking   Assisted  Transfer  Assisted  Bathing  Assisted  Dressing  Assisted  Toileting  Independent  Feeding  Independent  Med Admin  Independent  Med Delivery   none    Wound Care Documentation and Therapy:        Elimination:  Continence:   · Bowel: No  · Bladder: Yes and No  Urinary Catheter: None   Colostomy/Ileostomy/Ileal Conduit: No       Date of Last BM: ***    Intake/Output Summary (Last 24 hours) at 4/16/2021 1429  Last data filed at 4/16/2021 0839  Gross per 24 hour   Intake 400 ml   Output --   Net 400 ml     No intake/output data recorded. Safety Concerns:      At Risk for Falls    Impairments/Disabilities:      None    Nutrition Therapy:  Current Nutrition Therapy:   - Oral Diet:  NPO and Carb Control 4 carbs/meal (1800kcals/day)  - ***    Routes of Feeding: Oral  Liquids: {Slp liquid thickness:41207}  Daily Fluid Restriction: {CHP DME Yes amt example:[id number]:::0}  Last Modified Barium Swallow with Video (Video Swallowing Test): {Done Not Done ZGGQ:[id number]:::0}    Treatments at the Time of Hospital Discharge:   Respiratory Treatments: ***  Oxygen Therapy:  3l NC  Ventilator:    - No ventilator support    Rehab Therapies: Physical Therapy and Occupational Therapy  Weight Bearing Status/Restrictions: No weight bearing restirctions  Other

## 2021-04-16 NOTE — PROGRESS NOTES
[facility] home care referral. Spoke with spouse  re: home care plan of care/services. Agreeable. Demographic's verified. Aware of discharge with home care. [facility] care [facility].

## 2021-04-16 NOTE — PROGRESS NOTES
[facility]monary [facility] Critical [facility]  Progress note      Reason for Consult: Shortness of breath, pneumonia  Requesting Physician: Dr Herrera Frame:   [address] [facility]:                The patient is a 67 y.o. male with significant past medical history of:      Diagnosis Date    COPD (chronic obstructive pulmonary disease) ([facility]     Depressive disorder     Diabetes mellitus ([facility]      Interval history: Patient is starting to feel a little bit better.       Past Surgical History:        Procedure Laterality Date    CARDIAC SURGERY      CHOLECYSTECTOMY       Current Medications:    Current Facility-Administered Medications: predniSONE (DELTASONE) tablet 20 mg, 20 mg, Oral, BID  potassium chloride 10 mEq/100 mL IVPB (Peripheral Line), 10 mEq, Intravenous, PRN  potassium chloride (KLOR-CON M) extended release tablet 40 mEq, 40 mEq, Oral, BID WC  aspirin tablet 325 mg, 325 mg, Oral, Daily  DULoxetine (CYMBALTA) extended release capsule 60 mg, 60 mg, Oral, Daily  levothyroxine (SYNTHROID) tablet 88 mcg, 88 mcg, Oral, Daily  zolpidem (AMBIEN) tablet 5 mg, 5 mg, Oral, Nightly PRN  0.9 % sodium chloride infusion, , Intravenous, Continuous  sodium chloride flush 0.9 % injection 5-40 mL, 5-40 mL, Intravenous, 2 times per day  sodium chloride flush 0.9 % injection 5-40 mL, 5-40 mL, Intravenous, PRN  0.9 % sodium chloride infusion, 25 mL, Intravenous, PRN  enoxaparin (LOVENOX) injection 40 mg, 40 mg, Subcutaneous, Daily  promethazine (PHENERGAN) tablet 12.5 mg, 12.5 mg, Oral, Q6H PRN **OR** ondansetron (ZOFRAN) injection 4 mg, 4 mg, Intravenous, Q6H PRN  polyethylene glycol (GLYCOLAX) packet 17 g, 17 g, Oral, Daily PRN  acetaminophen (TYLENOL) tablet 650 mg, 650 mg, Oral, Q6H PRN **OR** acetaminophen (TYLENOL) suppository 650 mg, 650 mg, Rectal, Q6H PRN  azithromycin (ZITHROMAX) 500 mg in D5W 250ml Vial Mate, 500 mg, Intravenous, Q24H **AND** cefTRIAXone (ROCEPHIN) 1000 mg in sterile water 10 mL IV syringe, 1,000 mg, Intravenous, Q24H  albuterol (PROVENTIL) nebulizer solution 2.5 mg, 2.5 mg, Nebulization, Q2H PRN  glucose (GLUTOSE) 40 % oral gel 15 g, 15 g, Oral, PRN  dextrose 50 % IV solution, 12.5 g, Intravenous, PRN  glucagon (rDNA) injection 1 mg, 1 mg, Intramuscular, PRN  dextrose 5 % solution, 100 mL/hr, Intravenous, PRN  insulin glargine (LANTUS;BASAGLAR) injection pen 40 Units, 40 Units, Subcutaneous, Nightly  insulin lispro (1 Unit Dial) 0-12 Units, 0-12 Units, Subcutaneous, TID WC  insulin lispro (1 Unit Dial) 0-6 Units, 0-6 Units, Subcutaneous, Nightly  budesonide-formoterol (SYMBICORT) 160-4.5 MCG/ACT inhaler 2 puff, 2 puff, Inhalation, BID  tiotropium (SPIRIVA RESPIMAT) 2.5 MCG/ACT inhaler 2 puff, 2 puff, Inhalation, Daily    Allergies   Allergen Reactions    Dilaudid [Hydromorphone Hcl]     Morphine        Social History:    TOBACCO:   reports that he has quit smoking. He has never used smokeless tobacco.  ETOH:   reports previous alcohol use. Patient currently lives independently  Environmental/chemical exposure: None known    Family History:   No family history on file. REVIEW OF SYSTEMS:    CONSTITUTIONAL:  negative for fevers, chills, diaphoresis, activity change, appetite change, fatigue, night sweats and unexpected weight change.    EYES:  negative for blurred vision, eye discharge, visual disturbance and icterus  HEENT:  negative for hearing loss, tinnitus, ear drainage, sinus pressure, nasal congestion, epistaxis and snoring  RESPIRATORY:  See HPI  CARDIOVASCULAR:  negative for chest pain, palpitations, exertional chest pressure/discomfort, edema, syncope  GASTROINTESTINAL:  negative for nausea, vomiting, diarrhea, constipation, blood in stool and abdominal pain  GENITOURINARY:  negative for frequency, dysuria, urinary incontinence, decreased urine volume, and hematuria  HEMATOLOGIC/LYMPHATIC:  negative for easy bruising, bleeding and lymphadenopathy  ALLERGIC/IMMUNOLOGIC:  negative for recurrent infections, angioedema, anaphylaxis and drug reactions  ENDOCRINE:  negative for weight changes and diabetic symptoms including polyuria, polydipsia and polyphagia  MUSCULOSKELETAL:  negative for  pain, joint swelling, decreased range of motion and muscle weakness  NEUROLOGICAL:  negative for headaches, slurred speech, unilateral weakness  PSYCHIATRIC/BEHAVIORAL: negative for hallucinations, behavioral problems, confusion and agitation. Objective:   PHYSICAL EXAM:      VITALS:  /65   Pulse 72   Temp 98 °F (36.7 °C) (Axillary)   Resp 20   Ht 5' 8\" (1.727 m)   Wt 144 lb (65.3 kg)   SpO2 93%   BMI 21.90 kg/m²      24HR INTAKE/OUTPUT:      Intake/Output Summary (Last 24 hours) at 4/16/2021 1159  Last data filed at 4/16/2021 0839  Gross per 24 hour   Intake 400 ml   Output --   Net 400 ml     CONSTITUTIONAL:  awake, alert, cooperative, no apparent distress, and appears stated age  NECK:  Supple, symmetrical, trachea midline, no adenopathy, thyroid symmetric, not enlarged and no tenderness, skin normal  LUNGS:  no increased work of breathing and faint basilar crackles to auscultation. No accessory muscle use  CARDIOVASCULAR: S1 and S2, no edema and no JVD  ABDOMEN:  normal bowel sounds, non-distended and no masses palpated, and no tenderness to palpation. No hepatospleenomegaly  LYMPHADENOPATHY:  no axillary or supraclavicular adenopathy. No cervical adnenopathy  PSYCHIATRIC: Oriented to person place and time. No obvious depression or anxiety. MUSCULOSKELETAL: No obvious misalignment or effusion of the joints. No clubbing, cyanosis of the digits. SKIN:  normal skin color, texture, turgor and no redness, warmth, or swelling.  No palpable nodules    DATA:    Old records have been reviewed    CBC:  Recent Labs     04/14/21  1409 04/15/21  0511   WBC 10.8 4.2   RBC 4.17* 3.94*   HGB 13.3* 12.5*   HCT 40.8 37.7*    186   MCV 97.8 95.7   MCH 31.8 31.7   MCHC 32.6 33.1   RDW 17.4* 17.5*      BMP:  Recent

## 2021-04-16 NOTE — CONSULTS
Comprehensive Nutrition Assessment    Type and Reason for Visit:  Initial, Consult(unintentional weight loss)    Nutrition Recommendations/Plan:   1. Continue vanilla or strawberry Glucerna BID at lunch and dinner. 2. Encourage po/supplement intake. Nutrition Assessment:  Patient reports good appetite. States he usually eats 2-3 meals a day at home. Sometimes forgets to eat if he is working on a wood working project. States he weighed 444 lbs about 4 years ago and started dieting to lose weight. States he weighed around 170 lbs about 3-4 weeks ago. CBW is 144 lbs. Patient surprised he weighed this. States he stopped dieting at home once he got down to his desired weight. Patient agreeable to drink Glucerna. Patient nutritionally compromised due to significant weight loss in the past month. Patient also with hx of DM; a1c 4/14 was 5.9, indicating good control at home. Malnutrition Assessment:  Malnutrition Status: At risk for malnutrition (Comment)    Context:  Chronic Illness     Findings of the 6 clinical characteristics of malnutrition:  Energy Intake:  No significant decrease in energy intake  Weight Loss:  7 - Greater than 5% over 1 month     Body Fat Loss:  Unable to assess     Muscle Mass Loss:  Unable to assess    Fluid Accumulation:  1 - Mild Generalized   Strength:  Not Performed    Estimated Daily Nutrient Needs:  Energy (kcal):  9352-2867; Weight Used for Energy Requirements:  Current(65.3 kg)     Protein (g):  >/=78 grams; Weight Used for Protein Requirements:  Current        Method Used for Fluid Requirements:  1 ml/kcal      Nutrition Related Findings:  generalized, non-pitting edema      Wounds:  None       Current Nutrition Therapies:    DIET CARB CONTROL;   Dietary Nutrition Supplements: Diabetic Oral Supplement    Anthropometric Measures:  · Height: 5' 8\" (172.7 cm)  · Current Body Weight: 144 lb (65.3 kg)   · Admission Body Weight: 144 lb (65.3 kg)    · Usual Body Weight: 170 lb

## 2021-04-16 NOTE — PROGRESS NOTES
Physical Therapy    Facility/Department: [address] ONCOLOGY  Initial Assessment    NAME: Buzz Fiore  : 1945  MRN: [id number]    Date of Service: 2021    Discharge Recommendations:Erick Rodrigues scored a  on the AM-PAC short mobility form. Current research shows that an AM-PAC score of 17 or less is typically not associated with a discharge to the patient's home setting. Based on the patient's AM-PAC score and their current functional mobility deficits, it is recommended that the patient have 5-7 sessions per week of Physical Therapy at d/c to increase the patient's independence. At this time, this patient demonstrates the endurance, and/or tolerance for 3 hours of therapy each day, with a treatment frequency of 5-7x/wk. Please see assessment section for further patient specific details. If patient discharges prior to next session this note will serve as a discharge summary. Please see below for the latest assessment towards goals. PT Equipment Recommendations  Equipment Needed: No    Assessment   Body structures, Functions, Activity limitations: Decreased functional mobility ; Decreased strength;Decreased safe awareness;Decreased cognition;Decreased endurance;Decreased balance;Decreased sensation;Decreased posture  Assessment: Pt is limited by the above deficits and would benefit from skilled PT services to maximize pt functional mobility and safety prior to discharge. Pt is currently a high fall risk and requires min A for standing and ambulation. Recommend rehab prior to discharge home.   Treatment Diagnosis: decreased strength, balance, functional mobility  Prognosis: Good;Fair  Decision Making: Medium Complexity  PT Education: Goals;PT Role;Plan of Care  Patient Education: Pt verbalized understanding  Barriers to Learning: cognition  REQUIRES PT FOLLOW UP: Yes  Activity Tolerance  Activity Tolerance: Patient Tolerated treatment well  Activity Tolerance: SpO2 dropped to 83% on 2L; pt placed on 3L to recover seated in chair; left on 2L in chair, 94%; pt reports mild SOB with activity       Patient Diagnosis(es): The primary encounter diagnosis was Multifocal pneumonia. 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 has a past medical history of COPD (chronic obstructive pulmonary disease) ([facility] Depressive disorder, and Diabetes mellitus ([facility] has a past surgical history that includes Cardiac surgery and Cholecystectomy. Restrictions  Restrictions/Precautions  Restrictions/Precautions: Fall Risk, Up as Tolerated, Modified Diet(Carb control diet, up as tolerated)  Position Activity Restriction  Other position/activity restrictions: Elijah Gomez is a 67 y.o. male who presents for evaluation of shortness of breath. Patient has history of COPD and is on 3 L nasal cannula oxygen at baseline. States he has been feeling more short of breath over the past 4 to 5 weeks. States he has also had nonbloody diarrheal stools. No abdominal pain nausea vomiting. No fevers or chills. States the cough is productive.      Vision/Hearing  Vision: Impaired  Vision Exceptions: Wears glasses at all times  Hearing: Within functional limits       Subjective  General  Chart Reviewed: Yes  Family / Caregiver Present: No  Diagnosis: Acute on Chronic Respiratory Failure with hypoxemia  Follows Commands: Within Functional Limits  General Comment  Comments: Pt supine in bed upon arrival; agreeable to PT/OT  Subjective  Subjective: Pt denies pain  Pain Screening  Patient Currently in Pain: Denies          Orientation  Orientation  Overall Orientation Status: Impaired  Orientation Level: Oriented to person;Oriented to situation;Disoriented to time;Disoriented to place     Social/Functional History  Social/Functional History  Lives With:

## 2021-04-16 NOTE — DISCHARGE SUMMARY
Physician Discharge Summary     Patient ID:  Chun Cheung  [id number]  89 y.o.  1945    Admit date: 4/14/2021    Discharge date and time: 04/16/21    Admitting Physician: Robert Chung MD     Discharge Physician: Robert Chung MD    Admission Diagnoses: Acute on chronic respiratory failure with hypoxemia ([address] [J96.21], Community Acquired Pneumonia    Discharge Diagnoses: Acute on chronic respiratory failure with hypoxemia,Community Acquired Pneumonia,  Weight loss, Copd, hypokalemia, hypomagnesemia    Full Hospital Problem List:  Active Hospital Problems    Diagnosis Date Noted    Weight loss [R63.4] 04/15/2021    Type 2 diabetes mellitus ([address] [E11.9] 04/15/2021    Diabetic neuropathy associated with type 2 diabetes mellitus ([facility] [E11.40] 04/15/2021    COPD (chronic obstructive pulmonary disease) ([address] [J44.9] 04/15/2021    Hepatic steatosis [K76.0] 04/15/2021    Hypercholesterolemia [E78.00] 04/15/2021    Hypokalemia [E87.6] 04/15/2021    Hypomagnesemia [E83.42] 04/15/2021    Hypoalbuminemia [E88.09] 04/15/2021    Community acquired pneumonia [J18.9] 04/15/2021    Acute on chronic respiratory failure with hypoxemia ([address] [J96.21] 04/14/2021       Discharged Condition: fair    Hospital Course: The patient is a 67-year-old white male with past medical history significant for diabetes, hypercholesterolemia and obesity. The patient has been working on his diet, he has lost in 2 years about 200 pounds. Recently he has lost more weight. Within the last year he has lost about 50 pounds, 20 pounds within the last 2 months. He presented to the ER with increased shortness of breath and cough. At home, he is on 3 L of oxygen. In the ER he required 5 L on admission and now he is down to 3 L. Pt underwent ct scan of chest in er showing opacities throughout both lungs, right greater than left.   The patient was admitted for further evaluation and management of community acquired pneumonia. Blood/sputum cx ordered. Dietary consulted for profound weight loss. Pt also started on glucerna. Pt/ot consulted as well. . Pt was started on azithromycin, rocephin for cap. Pt was given steroids in er. He improved overnight and was down to less than his baseline of oxygen. ARU was recommended and pt declined. Pt did have one episode of hypoglycemia,so insulin was adjusted. Pt will be discharged to home with home care for pt/ot. He will go home on oral abx. Disposition: home    Consults made during Hospitalization:  IP CONSULT TO INTERNAL MEDICINE  IP CONSULT TO PULMONOLOGY  IP CONSULT TO DIETITIAN    Treatment team at time of Discharge: Treatment Team: Attending Provider: Sallie Suarez MD; Consulting Physician: Sallie Suarez MD; Utilization Reviewer: Geraldine Pearce RN; Registered Nurse: Konstantin Dwyer RN; Respiratory Therapist (Day): Enrrique Palomino RCP    Imaging Results:  Ct Head Wo Contrast    Result Date: 4/14/2021  EXAMINATION: CT OF THE HEAD WITHOUT CONTRAST  4/14/2021 3:35 pm TECHNIQUE: CT of the head was performed without the administration of intravenous contrast. Dose modulation, iterative reconstruction, and/or weight based adjustment of the mA/kV was utilized to reduce the radiation dose to as low as reasonably achievable. COMPARISON: 10/19/2020 HISTORY: ORDERING SYSTEM PROVIDED HISTORY: head injury, memory change TECHNOLOGIST PROVIDED HISTORY: Has a \"code stroke\" or \"stroke alert\" been called? ->No Reason for exam:->head injury, memory change Decision Support Exception->Emergency Medical Condition (MA) Reason for Exam: head injury, memory change; head injury Acuity: Unknown Type of Exam: Unknown FINDINGS: BRAIN/VENTRICLES: There is no acute intracranial hemorrhage, mass effect or midline shift. No abnormal extra-axial fluid collection. The gray-white differentiation is maintained without evidence of an acute infarct. There is no evidence of hydrocephalus. ORBITS: The visualized portion of the orbits demonstrate no acute abnormality. SINUSES: Opacification of the right mastoid air cells. SOFT TISSUES/SKULL:  No acute abnormality of the visualized skull or soft tissues. No acute intracranial abnormality. Ct Abdomen Pelvis W Iv Contrast Additional Contrast? None    Result Date: 4/15/2021  EXAMINATION: CT OF THE ABDOMEN AND PELVIS WITH CONTRAST; CTA OF THE CHEST 4/14/2021 3:36 pm TECHNIQUE: CT of the abdomen and pelvis was performed with the administration of intravenous contrast. Multiplanar reformatted images are provided for review. Dose modulation, iterative reconstruction, and/or weight based adjustment of the mA/kV was utilized to reduce the radiation dose to as low as reasonably achievable.; CTA of the chest was performed after the administration of intravenous contrast.  Multiplanar reformatted images are provided for review. MIP images are provided for review. Dose modulation, iterative reconstruction, and/or weight based adjustment of the mA/kV was utilized to reduce the radiation dose to as low as reasonably achievable. COMPARISON: 2/13/2020, 11/25/2018 HISTORY: ORDERING SYSTEM PROVIDED HISTORY: weight loss abnormal LFTs TECHNOLOGIST PROVIDED HISTORY: Reason for exam:->weight loss abnormal LFTs Additional Contrast?->None Decision Support Exception->Emergency Medical Condition (MA) Reason for Exam: weight loss abnormal LFTs Acuity: Unknown Type of Exam: Unknown; ORDERING SYSTEM PROVIDED HISTORY: sob, pulmonary fibrosis TECHNOLOGIST PROVIDED HISTORY: Reason for exam:->sob, pulmonary fibrosis Decision Support Exception->Emergency Medical Condition (MA) Reason for Exam: sob, pulmonary fibrosis Acuity: Unknown Type of Exam: Unknown FINDINGS: Pulmonary arteries: The pulmonary arteries are patent, without evidence of filling defect to suggest a pulmonary embolism. The main pulmonary artery is at the upper limits of normal in caliber. Lungs/pleura:  There are mild retained mucus secretions within the upper thoracic trachea. The central airways are otherwise patent. There is mild diffuse cylindrical bronchiectasis, similar to the prior examination. There is chronic moderate interstitial pulmonary fibrosis in a classic UIP pattern. There is a stable chronic small left pleural effusion. There is chronic biapical pleural and parenchymal scarring. There has been interval development of multiple new curvilinear and somewhat nodular consolidative opacities throughout both lungs, which are most likely infectious or inflammatory in etiology given their quick interval development since the study of 02/13/2020. These are primarily upper lobe predominant, with the largest opacity measuring approximately 2.8 x 2.0 cm within the central right upper lobe. There is no evidence of a pneumothorax. No definite new pulmonary nodule or mass is seen. Mediastinum: The thyroid is unremarkable. There is no pathologically enlarged supraclavicular or axillary lymphadenopathy. There does remain mildly enlarged paratracheal, subcarinal, and bilateral hilar lymphadenopathy, similar to the prior examination of 2/13/2020, with the largest lymph node measuring approximately 2.7 x 1.9 cm within the right hilar space. There is atherosclerotic disease of the thoracic aorta, without evidence of aneurysm. There are post CABG changes evident. There is native coronary atherosclerosis. No pericardial abnormality is identified. Organs: There is diffuse hepatic steatosis. However, no discrete intrahepatic nodule or mass is evident. The portal vein is patent. The gallbladder appears surgically absent. There is no evidence of intrahepatic or extrahepatic biliary ductal dilatation. The spleen is stable and unremarkable. The pancreas is atrophic, though otherwise unremarkable. The adrenal glands are normal bilaterally.   There is chronic left renal cortical scarring with a chronic lobular contour of the left kidney. There are bilateral renal cysts, with the largest measuring approximately 2.1 cm within the mid right kidney. There is a 3 mm nonobstructing calculus within the mid right kidney. However, there is no evidence of a ureteral calculus or hydronephrosis. GI/Bowel: Evaluation of the hollow GI tract demonstrates no evidence of abnormal bowel wall thickening, dilatation, or evidence of obstruction. Pelvis: The urinary bladder is distended, though otherwise normal.  The prostate is stable and unremarkable. There is no free pelvic fluid or pathologic pelvic lymphadenopathy. Peritoneum/Retroperitoneum: No intraperitoneal free air or free fluid is identified. No pathologic lymphadenopathy is seen. There are persistent nonspecific calcified mesenteric masses within the left upper and left lower quadrants, with the left upper lobe mass measuring approximately 3.8 x 3.3 cm on image 48 of series 8, and the left lower quadrant mass measuring 3.4 x 2.0 cm on image 110 of series 8. These are similar in comparison with the study of 2/13/2020, though have increased in size from the study of 11/25/2018. There is atherosclerotic disease of the abdominal aorta, without evidence of aneurysm. No significant abdominal wall hernia is evident. Bones/Soft Tissues: There appears to be fracturing of the lower sternal wires, with new overlapping of the leftward half of the lower sternotomy lying anterior to the rightward half of the sternotomy. However, there is no walled-off fluid collection or abscess at the sternotomy site. There is diffuse osteopenia. There is multilevel degenerative change throughout the thoracolumbar spine. No osteolytic or osteoblastic lesion is evident. 1. No CT evidence of a pulmonary embolism. 2. Stable chronic small left pleural effusion and chronic interstitial pulmonary fibrosis.  3. Interval development of multiple new nonspecific consolidative and somewhat nodular/mass-like opacities throughout both lungs, right greater than left, most likely reflecting a multifocal infectious or inflammatory process, to include the possibility of atypical multifocal pneumonia. Metastatic foci are considered less likely, though not excluded. 4. Stable mediastinal and bilateral hilar lymphadenopathy, which may be benign and reactive in etiology or represent metastatic disease. 5. No acute process within the abdomen or pelvis. 6. Stable nonspecific calcified mesenteric masses unchanged from 02/13/2020, though increased in size from 01/25/2018. Differential considerations include granulomatous disease, carcinoid tumor, treated metastases, and retractile mesenteritis. 7. Diffuse hepatic steatosis without evidence of biliary obstruction. 8. Chronic left renal cortical scarring, with stable bilateral renal cysts. 9. Nonobstructing right nephrolithiasis. 10. Patient status post median sternotomy, with interval development of fracturing of the patient's lower mediastinal wires and new overlapping of the median sternotomy limbs along the lower chest.  However, there is no evidence of a sternal fluid collection or abscess. Clinical correlation is advised. RECOMMENDATIONS: Suggest appropriate clinical treatment, and short-term chest CT follow-up in 8-12 weeks to ensure resolution of the multifocal consolidative and nodular/mass-like opacities throughout both lungs, to ensure their resolution, as metastatic disease cannot be excluded. Xr Chest Portable    Result Date: 4/14/2021  EXAMINATION: ONE XRAY VIEW OF THE CHEST 4/14/2021 2:29 pm COMPARISON: Chest CT 02/13/2020. HISTORY: ORDERING SYSTEM PROVIDED HISTORY: SOB TECHNOLOGIST PROVIDED HISTORY: Reason for exam:->SOB Reason for Exam: c/o SOB for a while Acuity: Chronic Type of Exam: Ongoing FINDINGS: The cardiomediastinal silhouette is unremarkable status post median sternotomy.   Coarsened interstitial markings throughout the lungs with mild pleural thickening laterally on the left. Findings are similar on the previous CT in suggests interstitial pulmonary fibrosis. No superimposed acute infiltrate, significant free pleural fluid or evidence of overt failure. No acute cardiopulmonary disease. Interstitial changes, likely chronic and related to pulmonary fibrosis     Ct Chest Pulmonary Embolism W Contrast    Result Date: 4/15/2021  EXAMINATION: CT OF THE ABDOMEN AND PELVIS WITH CONTRAST; CTA OF THE CHEST 4/14/2021 3:36 pm TECHNIQUE: CT of the abdomen and pelvis was performed with the administration of intravenous contrast. Multiplanar reformatted images are provided for review. Dose modulation, iterative reconstruction, and/or weight based adjustment of the mA/kV was utilized to reduce the radiation dose to as low as reasonably achievable.; CTA of the chest was performed after the administration of intravenous contrast.  Multiplanar reformatted images are provided for review. MIP images are provided for review. Dose modulation, iterative reconstruction, and/or weight based adjustment of the mA/kV was utilized to reduce the radiation dose to as low as reasonably achievable. COMPARISON: 2/13/2020, 11/25/2018 HISTORY: ORDERING SYSTEM PROVIDED HISTORY: weight loss abnormal LFTs TECHNOLOGIST PROVIDED HISTORY: Reason for exam:->weight loss abnormal LFTs Additional Contrast?->None Decision Support Exception->Emergency Medical Condition (MA) Reason for Exam: weight loss abnormal LFTs Acuity: Unknown Type of Exam: Unknown; ORDERING SYSTEM PROVIDED HISTORY: sob, pulmonary fibrosis TECHNOLOGIST PROVIDED HISTORY: Reason for exam:->sob, pulmonary fibrosis Decision Support Exception->Emergency Medical Condition (MA) Reason for Exam: sob, pulmonary fibrosis Acuity: Unknown Type of Exam: Unknown FINDINGS: Pulmonary arteries: The pulmonary arteries are patent, without evidence of filling defect to suggest a pulmonary embolism.   The main pulmonary artery is at the upper

## 2021-04-16 NOTE — PROGRESS NOTES
Shift assessment completed. Routine vitals stable. SpO2 99% on 2L. Scheduled medications given. Patient is awake, alert and oriented. Respirations are easy and unlabored. Patient does not appear to be in distress. Call light within reach. Bed alarm on. Pt's wife at bedside.

## 2021-04-17 NOTE — ED PROVIDER NOTES
I independently performed a history and physical on Alda Cole. All diagnostic, treatment, and disposition decisions were made by myself in conjunction with the advanced practice provider. Briefly, this is a 67 y.o. male here for AMS  Apparently recent admission for PNA, DC'd today on steroids, levofloxacin, now here altered, confused, threatening staff, psychotic. On exam, WDWN M, NAD  Heart RRR Lungs CTA  A&O x name. Can't tell me whom he lives with (his wife), where he lives (with his wife in his own home), where he's at, what he was doing this morning. 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 Spoke with Dr. Shlomo Hammans; requests psych consult/transfer. Will do psych workup. Work-up reviewed. Medically clear for psych. He has some hematuria and some yeast.  Mild transaminitis as well. .  Work-up otherwise benign. 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 Continue levofloxacin. Stop steroids. He is medically cleared for psych at this juncture. Patient Referrals:  No follow-up provider specified. Discharge Medications:  New Prescriptions    No medications on file       FINAL IMPRESSION  No diagnosis found. There were no vitals taken for this visit. For further details of Leeann Rodrigues emergency department encounter, please see documentation by advanced practice provider, Segundo Finley.        Liborio Cronin MD  04/17/21 [id number]       Liborio Cronin MD  04/17/21 [id number]
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
## 2021-04-17 NOTE — ED NOTES
A safety risk assessment of the patient environment was conducted and the room was modified for safety. The room is free of all removable equipment, medical supplies, and articles that may pose a threat to the patient or others such as sharp items and needle boxes. Items were removed from unlocked drawers, cabinets are locked when locks are available, extra linens, medical cords, cables, pictures from wall, ect. Are all removed. The patient call light was left in place due to the medical nature of the unit ad the needs of the patient. The patient was place in a room close to the nursing desk. The patient was placed in a hospital gown. Patient  at bedside. Bed locked and in lowest position with both side rails raised. Call light within reach.                Jeremy Torrez RN  04/16/21 [id number]

## 2021-04-17 NOTE — ED NOTES
Pt is now calm. Room is safe and sitter at bedside. Bed alarm on.       Janet Da Silva RN  04/16/21 [id number]

## 2021-04-17 NOTE — ED NOTES
Pt in bed states that he has to pee, pt assisted with urinal, unable to urinate. Pt repositioned in bed. Pt room remains safe, pt with sitter at bedside for safety.       Izabela Mchugh RN  04/17/21 1415

## 2021-04-17 NOTE — ED PROVIDER NOTES
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 shows mild atrophy and chronic microscopic disease with no acute abnormalities. I do believe patient warrants transfer to psychiatric facility for further evaluation management of acute psychosis. 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 Steroids will be withheld but he will be continued on antibiotics for treatment of pneumonia. He is medically cleared at this time. FINAL IMPRESSION      1.  Acute psychosis ([facility] [address]          DISPOSITION/PLAN   DISPOSITION Decision To Transfer 04/16/2021 11:45:00 PM      PATIENT REFERREDTO:  No follow-up provider specified.     DISCHARGE MEDICATIONS:  New Prescriptions    No medications on file       DISCONTINUED MEDICATIONS:  Discontinued Medications    No medications on file              (Please note that portions of this note were completed with a voice recognition program.  Efforts were made to edit the dictations but occasionally words are mis-transcribed.)    Julia Loya PA-C (electronically signed)            Sj Sandoval PA-C  04/16/21 1727

## 2021-04-17 NOTE — ED NOTES
Pt keeps repeating that he wants to get up, that he is unable to stay in bed, pt states that he is going crazy.       Lorne Saldaña, NA  04/17/21 0010

## 2021-04-20 PROBLEM — J96.20 ACUTE ON CHRONIC RESPIRATORY FAILURE (HCC): Status: ACTIVE | Noted: 2021-01-01

## 2021-04-20 NOTE — H&P
Take 1 tablet by mouth daily for 10 days 4/16/21 4/26/21  Pedro Guy MD   methylPREDNISolone (MEDROL DOSEPACK) 4 MG tablet Take by mouth. 4/16/21 4/22/21  Pedro Gyu MD   fluticasone-umeclidin-vilant (TRELEGY ELLIPTA) 925-52.6-23 MCG/INH AEPB Inhale 1 puff into the lungs daily    Historical Provider, MD   levothyroxine (SYNTHROID) 88 MCG tablet  1/3/20   Historical Provider, MD   zolpidem (AMBIEN) 5 MG tablet zolpidem 5 mg tablet    Historical Provider, MD   aspirin 325 MG tablet Take 325 mg by mouth daily    Historical Provider, MD   naproxen sodium (ALEVE) 220 MG tablet Take 220 mg by mouth 2 times daily (with meals)    Historical Provider, MD   DULoxetine HCl (CYMBALTA PO) Take by mouth    Historical Provider, MD   Insulin NPH Isophane & Regular (NOVOLIN 70/30 FLEXPEN) (70-30) 100 UNIT per ML injection pen Inject into the skin 2 times daily    Historical Provider, MD       Allergies:  Dilaudid [hydromorphone hcl] and Morphine    Social History:      The patient currently lives home coming from [facility]    TOBACCO:   reports that he has quit smoking. He has never used smokeless tobacco.  ETOH:   reports previous alcohol use. Family History:       Reviewed in detail and negative for DM, CAD, Cancer, CVA. Positive as follows:    History reviewed. No pertinent family history. REVIEW OF SYSTEMS:   Pertinent positives as noted in the HPI. All other systems reviewed and negative. PHYSICAL EXAM PERFORMED:    BP (!) 70/59   Pulse 99   Temp 97.5 °F (36.4 °C) (Oral)   Resp 29   Ht 5' 8\" (1.727 m)   Wt 144 lb (65.3 kg)   SpO2 94%   BMI 21.90 kg/m²     General appearance:  Ill looking, Cachetic  HEENT:  Normal cephalic, atraumatic without obvious deformity. Pupils equal, round, and reactive to light. Extra ocular muscles intact. Conjunctivae/corneas clear. Neck: Supple, with full range of motion. No jugular venous distention. Trachea midline.   Respiratory: Tachypneic in mild of oxygen at baseline  #COPD exacerbation. Continue prednisone 40 mg daily for 5 days  DuoNeb every 4 hours. Continue Trelegy. Check D-dimer, procalcitonin level 0.28. Check MRSA cont cefepime     #SHAWANDA likely prerenal in setting of dehydration. Creatinine 1.6. IVF. Will monitor. #Hypernatremia suspected due to dehydration  Follow-up on urine electrolytes. Urine osmolality, serum osmolality. We will repeat BMP at 8 PM.  Continue hydration with half-normal saline/D5W    #UTI  UA pyuria, large leukocyte Estrace positive yeast  Follow-up on cultures. Diflucan. Antibiotics with cefepime. De-esclate per culture reports    #Lactic acidosis likely due to hypoxia  We will monitor. #Elevated liver enzymes likely due to Levaquin. Patient has a remote history of alcohol abuse. Trend liver enzymes if not getting better would consider ultrasound abdomen. #Elevated troponin likely type II demand ischemia  We will trend troponin    #Elevated proBNP patient does not appear to be volume overloaded. With history of CABG we will obtain echocardiogram.    #Hypothyroid  Per medication list from [facility]. Patient was not on Synthroid. But he was discharged with Synthroid 88 MCG daily. Check TSH level. #Depression  Suicide precautions. Psych consulted. #Moderate protein calorie malnutrition. DVT Prophylaxis: Lovenox  Diet: DIET CARB CONTROL;  Code Status: Full Code    PT/OT Eval Status: Consulted    Dispo -inpatient. Pending clinical course likely discharge in 2 to 3 days. This chart was likely completed using voice recognition technology and may contain unintended grammatical , phraseology,and/or punctuation errors     Naren Reynolds MD    Thank you Fred Mohamud MD for the opportunity to be involved in this patient's care. If you have any questions or concerns please feel free to contact me at [phone].

## 2021-04-20 NOTE — CONSULTS
Pharmacy Consult Note  - Admission Medication Reconciliation      Pharmacy consulted for reconciliation of ylwqx-cj-rntnbhprh medications. I reviewed Rx fill history via \"Complete Dispense Report\" in [place], and [address] [facility] The following changes made to bwwtc-xp-phbunspwi medication list:    ADDED:  1. Novolog SSI  2. lexapro  3. Risperidone  4. Trazodone  5. Milk of Mag  6. Mag- Al Plus  7. acetaminophen    Dose or Frequency CHANGE:  1. none    REMOVED:  1. Medrol DosePak  2. Levothyroxine  3. ambien  4. Aspirin  5. Naproxen  6. Duloxetine  7. Insulin NPH    NOTES:  Patient was admitted to [facility] on 4/19 and list is based off that MAR. Patient has recently filled Cymbalta 60mg daily, levothyroxine 88mcg daily (2/15), medrol dose shanon (started 4/16), allopurinol 300mg daily, atorvastatin 20mg nightly, metformin 500mg TID with meals. Levofloxacin ordered for 10 days (last dose should be 4/25)    UPDATED HOME MEDICATION LIST  No current facility-administered medications on file prior to encounter.       Medication Sig    insulin aspart (NOVOLOG FLEXPEN) 100 UNIT/ML injection pen Inject into the skin 4 times daily (with meals and nightly) -249 = 2 units  -299 = 4 units  - 349 = 6 units  - 399 = 8 units  BG >400 = 10 units    escitalopram (LEXAPRO) 10 MG tablet Take 10 mg by mouth daily    risperiDONE (RISPERDAL) 0.25 MG tablet Take 0.25 mg by mouth daily    levoFLOXacin (LEVAQUIN) 500 MG tablet Take 1 tablet by mouth daily for 10 days (Patient taking differently: Take 500 mg by mouth daily For 10 days (last dose 4/25))    fluticasone-umeclidin-vilant (TRELEGY ELLIPTA) 100-62.5-25 MCG/INH AEPB Inhale 1 puff into the lungs daily    traZODone (DESYREL) 50 MG tablet Take 50 mg by mouth nightly as needed for Sleep    magnesium hydroxide (MILK OF MAGNESIA) 400 MG/5ML suspension Take by mouth daily as needed for Constipation    aluminum & magnesium hydroxide-simethicone (MAG-AL PLUS) 374-683-18 MG/5ML SUSP suspension Take 30 mLs by mouth every 4 hours as needed for Indigestion    acetaminophen (TYLENOL) 325 MG tablet Take 650 mg by mouth every 6 hours as needed (headache) Max dose 3g in 24 hours          Thank you for the consult  Please call with questions:    Saadia FRAZIER   4/20/2021 3:48 PM  [phone] ([address] [place] [address])

## 2021-04-20 NOTE — ED PROVIDER NOTES
ED Attending Attestation Note     Date of evaluation: 4/20/2021    This patient was seen by the advance practice provider. I have seen and examined the patient, agree with the workup, evaluation, management and diagnosis. The care plan has been discussed. I have reviewed the ECG and concur with the NICKOLAS's interpretation. My assessment reveals patient sent from [facility] with increasing work of breathing and hypoxia, recent hospitalization for CAP+COPD now on Levo and steroids. In [facility] due to steroid psychosis but respiratory status was worsening. Workup showing interstitial lung disease, leukocytosis, SHAWANDA, and BNP elevation. Given increasing O2 requirement and need for high flow, will admit. Critical Care:  Due to the immediate potential for life-threatening deterioration due to respiratory failure, I spent 30 minutes providing critical care. This time excludes time spent performing procedures but includes time spent on direct patient care, history retrieval, review of the chart, and discussions with patient, family, and consultant(s).        Melisa Douglass MD  04/20/21 5666

## 2021-04-20 NOTE — PROGRESS NOTES
RESPIRATORY THERAPY ASSESSMENT    Name:  [address] Record Number:  [id number]  Age: 67 y.o. Gender: male  : 1945  Today's Date:  2021  Room:  [address] [place] [address]    Assessment     Is the patient being admitted for a COPD or Asthma exacerbation? No   (If yes the patient will be seen every 4 hours for the first 24 hours and then reassessed)    Patient Admission Diagnosis      Allergies  Allergies   Allergen Reactions    Dilaudid [Hydromorphone Hcl]     Morphine            Pulmonary History:COPD  Home Oxygen Therapy:  3 liters/min via nasal cannula  Home Respiratory Therapy:Trelegy   Current Respiratory Therapy:  HHN Duoneb Q4w/a  Treatment Type: HHN  Medications: Albuterol    Respiratory Severity Index(RSI)   Patients with orders for inhalation medications, oxygen, or any therapeutic treatment modality will be placed on Respiratory Protocol. They will be assessed with the first treatment and at least every 72 hours thereafter. The following severity scale will be used to determine frequency of treatment intervention.     Smoking History: Pulmonary Disease or Smoking History, Greater than 15 pack year = 2    Social History  Social History     Tobacco Use    Smoking status: Former Smoker    Smokeless tobacco: Never Used   Substance Use Topics    Alcohol use: Not Currently    Drug use: Not on file       Recent Surgical History: None = 0  Past Surgical History  Past Surgical History:   Procedure Laterality Date    CARDIAC SURGERY      CHOLECYSTECTOMY         Level of Consciousness: Alert, Oriented, and Cooperative = 0    Level of Activity: Mostly sedentary, minimal walking = 2    Respiratory Pattern: Regular Pattern; RR 8-20 = 0    Breath Sounds: Diminshed bilaterally and/or crackles = 2    Sputum   ,  ,    Cough: Strong, spontaneous, non-productive = 0    Vital Signs   BP 83/68   Pulse 92   Temp 97.5 °F (36.4 °C) (Oral)   Resp 24   Ht 5' 8\" (1.727 m)   Wt 144 lb (65.3 kg)   SpO2 94% patients on mechanical ventilation. 6. Inhalation medication orders will be delivered and/or substituted as outlined below. Aerosolized Medications Ordering and Administration Guidelines:    1. All Medications will be ordered by a physician, and their frequency and/or modality will be adjusted as defined by the patients Respiratory Severity Index (RSI) score. 2. If the patient does not have documented COPD, consider discontinuing anticholinergics when RSI is less than 9.  3. If the bronchospasm worsens (increased RSI), then the bronchodilator frequency can be increased to a maximum of every 4 hours. If greater than every 4 hours is required, the physician will be contacted. 4. If the bronchospasm improves, the frequency of the bronchodilator can be decreased, based on the patient's RSI, but not less than home treatment regimen frequency. 5. Bronchodilator(s) will be discontinued if patient has a RSI less than 9 and has received no scheduled or as needed treatment for 72  Hrs. Patients Ordered on a Mucolytic Agent:    1. Must always be administered with a bronchodilator. 2. Discontinue if patient experiences worsened bronchospasm, or secretions have lessened to the point that the patient is able to clear them with a cough. Anti-inflammatory and Combination Medications:    1. If the patient lacks prior history of lung disease, is not using inhaled anti-inflammatory medication at home, and lacks wheezing by examination or by history for at least 24 hours, contact physician for possible discontinuation.

## 2021-04-20 NOTE — TELEPHONE ENCOUNTER
Writer contacted Dr. Vickie Oneil, ED provider to inform of 30 day readmission risk. Writer's attempt to contact ED provider was unsuccessful.

## 2021-04-20 NOTE — PROGRESS NOTES
4 Eyes Admission Assessment     I agree as the admission nurse that 2 RN's have performed a thorough Head to Toe Skin Assessment on the patient. ALL assessment sites listed below have been assessed on admission. Areas assessed by both nurses: Clabe Burdock  [x]   Head, Face, and Ears   [x]   Shoulders, Back, and Chest - Pointed sternum from previous surgury with bruising and healing wound. [x]   Arms, Elbows, and Hands scattered abrasions. [x]   Coccyx, Sacrum, and Ischium - stage 2 left buttocks, DTI right buttocks, red non blanchable anus, scattered redness. Penis red, excoriated. [x]   Legs, Feet, and Heels - Bilateral red heels, blanchable. Scab on bilateral 2nd toe. Does the Patient have Skin Breakdown?   Yes a wound was noted on the Admission Assessment and an LDA was Initiated documentation include the Aniyah-wound, Wound Assessment, Measurements, Dressing Treatment, Drainage, and Color\",         Yeison Prevention initiated:  Yes   Wound Care Orders initiated:  No      [facility] nurse consulted for Pressure Injury (Stage 3,4, Unstageable, DTI, NWPT, and Complex wounds) or Yeison score 18 or lower:  No      Nurse 1 eSignature: Electronically signed by Christine Vu RN on 4/20/21 at 5:54 PM EDT    **SHARE this note so that the co-signing nurse is able to place an eSignature**    Nurse 2 eSignature: Electronically signed by Riccardo Hood RN on 4/20/21 at 7:24 PM EDT

## 2021-04-20 NOTE — CONSULTS
Clinical Pharmacy Progress Note    Admit date: 4/20/2021   Patient presents to the ED with complaints of SOB and AMS. Concern for sepsis at this time. Pharmacy is consulted to dose Vancomycin x1 dose in ED per BERNARDO Amador PA-C.    Ht = 68 in  Wt = 65.3 kg      Assessment/Plan:  · Will order Vancomycin 1.25g x1 for administration in ED per ER pharmacy consult. · If Vancomycin is to continue on admission and pharmacy is to manage dosing, please re-consult with admission orders.       Thanks--  Cem Pollack PharmD., BCPS   4/20/2021 2:03 PM  Wireless: [phone]

## 2021-04-20 NOTE — ED PROVIDER NOTES
meals and nightly) -249 = 2 units  -299 = 4 units  - 349 = 6 units  - 399 = 8 units  BG >400 = 10 units      escitalopram (LEXAPRO) 10 MG tablet Take 10 mg by mouth daily      risperiDONE (RISPERDAL) 0.25 MG tablet Take 0.25 mg by mouth daily      levoFLOXacin (LEVAQUIN) 500 MG tablet Take 1 tablet by mouth daily for 10 days  Qty: 10 tablet, Refills: 0      fluticasone-umeclidin-vilant (TRELEGY ELLIPTA) 100-62.5-25 MCG/INH AEPB Inhale 1 puff into the lungs daily      traZODone (DESYREL) 50 MG tablet Take 50 mg by mouth nightly as needed for Sleep      magnesium hydroxide (MILK OF MAGNESIA) 400 MG/5ML suspension Take by mouth daily as needed for Constipation      aluminum & magnesium hydroxide-simethicone (MAG-AL PLUS) 200-200-20 MG/5ML SUSP suspension Take 30 mLs by mouth every 4 hours as needed for Indigestion      acetaminophen (TYLENOL) 325 MG tablet Take 650 mg by mouth every 6 hours as needed (headache) Max dose 3g in 24 hours             Allergies     He is allergic to dilaudid [hydromorphone hcl] and morphine. Physical Exam     INITIAL VITALS: BP: (!) 132/103, Temp: 97.5 °F (36.4 °C), Pulse: 103, Resp: (!) 32, SpO2: 94 %  Physical Exam  Constitutional:       Comments: Thin appearing male, lying on stretcher appears to be in acute respiratory distress with increased work of breathing   HENT:      Head: Normocephalic. Cardiovascular:      Rate and Rhythm: Normal rate and regular rhythm. Pulmonary:      Effort: Pulmonary effort is normal.      Breath sounds: Normal breath sounds. No decreased breath sounds, wheezing, rhonchi or rales. Abdominal:      Palpations: Abdomen is soft. Musculoskeletal:      Right lower leg: No edema. Left lower leg: No edema. Skin:     General: Skin is warm and dry. Capillary Refill: Capillary refill takes less than 2 seconds. Coloration: Skin is pale. Neurological:      General: No focal deficit present.       Mental Status: He is alert. Comments: Alert to person and place, not time or date   Psychiatric:         Mood and Affect: Mood normal.         Behavior: Behavior normal.         Diagnostic Results     EKG   Interpreted in conjunction with emergency department physician No att. providers found  Rhythm: normal sinus   Rate: tachycardia  Axis: normal  Ectopy: none  Conduction: normal  ST Segments: normal  T Waves: no acute change  Q Waves:none  Clinical Impression: no acute changes  Comparison:  4/14/21    RADIOLOGY:  XR CHEST PORTABLE   Final Result      1. Decreased small left pleural effusion. 2. Chronic interstitial lung disease/pulmonary fibrosis.           LABS:   Results for orders placed or performed during the hospital encounter of 04/20/21   CBC Auto Differential   Result Value Ref Range    WBC 18.0 (H) 4.0 - 11.0 K/uL    RBC 4.30 4.20 - 5.90 M/uL    Hemoglobin 13.9 13.5 - 17.5 g/dL    Hematocrit 43.2 40.5 - 52.5 %    .5 (H) 80.0 - 100.0 fL    MCH 32.3 26.0 - 34.0 pg    MCHC 32.2 31.0 - 36.0 g/dL    RDW 18.1 (H) 12.4 - 15.4 %    Platelets 250 270 - 295 K/uL    MPV 9.1 5.0 - 10.5 fL    Neutrophils % 91.8 %    Lymphocytes % 4.0 %    Monocytes % 3.7 %    Eosinophils % 0.0 %    Basophils % 0.5 %    Neutrophils Absolute 16.6 (H) 1.7 - 7.7 K/uL    Lymphocytes Absolute 0.7 (L) 1.0 - 5.1 K/uL    Monocytes Absolute 0.7 0.0 - 1.3 K/uL    Eosinophils Absolute 0.0 0.0 - 0.6 K/uL    Basophils Absolute 0.1 0.0 - 0.2 K/uL   Basic Metabolic Panel w/ Reflex to MG   Result Value Ref Range    Sodium 150 (H) 136 - 145 mmol/L    Potassium reflex Magnesium 4.7 3.5 - 5.1 mmol/L    Chloride 109 99 - 110 mmol/L    CO2 21 21 - 32 mmol/L    Anion Gap 20 (H) 3 - 16    Glucose 115 (H) 70 - 99 mg/dL    BUN 25 (H) 7 - 20 mg/dL    CREATININE 1.6 (H) 0.8 - 1.3 mg/dL    GFR Non-African American 42 (A) >60    GFR  51 (A) >60    Calcium 9.4 8.3 - 10.6 mg/dL   Urinalysis, reflex to microscopic   Result Value Ref Range    Color, UA Yellow Straw/Yellow    Clarity, UA Clear Clear    Glucose, Ur Negative Negative mg/dL    Bilirubin Urine Negative Negative    Ketones, Urine Negative Negative mg/dL    Specific Gravity, UA 1.010 1.005 - 1.030    Blood, Urine LARGE (A) Negative    pH, UA 6.5 5.0 - 8.0    Protein, UA TRACE (A) Negative mg/dL    Urobilinogen, Urine 0.2 <2.0 E.U./dL    Nitrite, Urine POSITIVE (A) Negative    Leukocyte Esterase, Urine LARGE (A) Negative    Microscopic Examination YES     Urine Type NotGiven    Brain Natriuretic Peptide   Result Value Ref Range    Pro-BNP 17,541 (H) 0 - 449 pg/mL   Troponin   Result Value Ref Range    Troponin 0.08 (H) <0.01 ng/mL   Blood gas, venous (Lab)   Result Value Ref Range    pH, Sukhdeep 7.292 (L) 7.350 - 7.450    pCO2, Sukhdeep 49.0 41.0 - 51.0 mmHg    pO2, Sukhdeep <30.0 25 - 40 mmHg    HCO3, Venous 23.6 (L) 24.0 - 28.0 mmol/L    Base Excess, Sukhdeep -3.4 (L) -2.0 - 3.0 mmol/L    O2 Sat, Sukhdeep 40 Not established %    Carboxyhemoglobin 1.3 0.0 - 1.5 %    MetHgb, Sukhdeep 0.3 0.0 - 1.5 %    TC02 (Calc), Sukhdeep 25 mmol/L    Hemoglobin, Sukhdeep, Reduced 58.90 %   Hepatic function panel (LFTs)   Result Value Ref Range    Total Protein 7.1 6.4 - 8.2 g/dL    Albumin 3.0 (L) 3.4 - 5.0 g/dL    Alkaline Phosphatase 399 (H) 40 - 129 U/L     (H) 10 - 40 U/L     (H) 15 - 37 U/L    Total Bilirubin 2.1 (H) 0.0 - 1.0 mg/dL    Bilirubin, Direct 1.0 (H) 0.0 - 0.3 mg/dL    Bilirubin, Indirect 1.1 (H) 0.0 - 1.0 mg/dL   Lactate, Sepsis   Result Value Ref Range    Lactic Acid, Sepsis 6.9 (HH) 0.4 - 1.9 mmol/L   Lactate, Sepsis   Result Value Ref Range    Lactic Acid, Sepsis 5.7 (HH) 0.4 - 1.9 mmol/L   Microscopic Urinalysis   Result Value Ref Range    WBC, UA 10-20 (A) 0 - 5 /HPF    RBC, UA 3-4 0 - 4 /HPF    Epithelial Cells, UA 0-1 0 - 5 /HPF    Bacteria, UA 1+ (A) None Seen /HPF    Yeast, UA Present (A) None Seen /HPF   Procalcitonin   Result Value Ref Range    Procalcitonin 0.28 (H) 0.00 - 0.15 ng/mL   Sodium, urine, random   Result Value predniSONE (DELTASONE) tablet 40 mg    fluconazole (DIFLUCAN) tablet 100 mg     Order Specific Question:   Antimicrobial Indications     Answer:   Urinary Tract Infection    perflutren lipid microspheres (DEFINITY) injection 1.65 mg    insulin lispro (1 Unit Dial) 0-12 Units    insulin lispro (1 Unit Dial) 0-6 Units    escitalopram (LEXAPRO) tablet 10 mg    risperiDONE (RISPERDAL) tablet 0.25 mg    traZODone (DESYREL) tablet 50 mg       CONSULTS:  PHARMACY TO DOSE VANCOMYCIN  IP CONSULT TO HOSPITALIST  IP CONSULT TO PHARMACY  IP CONSULT TO PSYCHIATRY  IP CONSULT TO SPIRITUAL SERVICES  IP CONSULT TO SOCIAL WORK    MEDICAL DECISION MAKING / ASSESSMENT / Nery Rivera is a 67 y.o. male resenting to the emergency department for concern of new hypoxia while on baseline oxygen requirement of 3 L. He is currently being treated for pneumonia on Levaquin and has a history of COPD. Upon presentation, we had difficulty obtaining initial oxygen saturation, he remained on a nonrebreather until he was found to have O2 sat of high 90s. We were then able to wean him down to his baseline of 3 L. Did have concerns that there may be a component of COPD exacerbation and he was given DuoNeb treatment, he was not given prednisone with concern for recent steroid-induced psychosis. Vitals initially were stable, no tachycardia, he was normotensive. During period of observation he became hypotensive and was given 1 L of LR. Labs were significant for urinary tract infection with metabolic acidosis, acute kidney injury with a creatinine of 1.6, hyponatremia suspected to be due to dehydration with a potassium of 150. He was started on vancomycin and cefepime and blood cultures were sent. Discussed with the hospitalist and he was accepted for admission. He will be monitored here in the emergency department until he is moved to his new treatment location.   Blood pressure remained stable during this period of observation. This patient was also evaluated by the attending physician. All care plans were discussed and agreed upon. Clinical Impression     1. Urinary tract infection without hematuria, site unspecified    2. Altered mental status, unspecified altered mental status type    3. Acute kidney injury ([facility] [address]        Disposition     PATIENT REFERRED TO:  No follow-up provider specified.     DISCHARGE MEDICATIONS:  Current Discharge Medication List          DISPOSITION Admitted 04/20/2021 02:47:40 PM        Vinny Lewis PA-C  04/20/21 1702

## 2021-04-21 NOTE — PLAN OF CARE
Nutrition Problem #1: Predicted inadequate energy intake  Intervention: Food and/or Nutrient Delivery: Continue Current Diet, Start Oral Nutrition Supplement  Nutritional Goals: Pt to meet >50% of nutritional requirements from diet and ONS

## 2021-04-21 NOTE — PLAN OF CARE
Problem: Falls - Risk of:  Goal: Will remain free from falls  Description: Will remain free from falls  4/21/2021 1531 by Casandra Jon RN  Outcome: Met This Shift   Pt will remain free from accidental injury this shift. Pt has fall risk measures (fall risk bracelet, fall risk sign, fall risk cloth, non-slip socks, dome light on) in place. Pt bed is in low position, bed alarm on, bed wheels locked, call light within reach, bedside table within reach, chair wheels locked, chair alarm on. Problem: Skin Integrity:  Goal: Absence of new skin breakdown  Description: Absence of new skin breakdown  4/21/2021 1531 by Casandra Jon RN  Outcome: Ongoing   Wounds inspected and cleaned. Pt able to turn self. No further signs of skin breakdown. Problem: Confusion - Acute:  Goal: Mental status will be restored to baseline  Description: Mental status will be restored to baseline  4/21/2021 1531 by Casandra Jon RN  Outcome: Ongoing   Pt confused. Oriented to person and knows the year. Pt at times delusional.     Problem: Respiratory:  Goal: Ability to maintain adequate ventilation will improve  Description: Ability to maintain adequate ventilation will improve  4/21/2021 0238 by Mallory Jasso  Outcome: Ongoing   Pt on  2L O2 through shift. O2 >90% through shift. Problem: Cardiac:  Goal: Hemodynamic stability will improve  Description: Hemodynamic stability will improve  4/21/2021 0238 by Mallory Jasso  Outcome: Ongoing   Pt BP in 80's /60's through shift. MD aware. Fluid bolus' given. Problem: Serum Glucose Level - Abnormal:  Goal: Ability to maintain appropriate glucose levels will improve  Description: Ability to maintain appropriate glucose levels will improve  4/21/2021 0238 by Mallory Jasso  Outcome: Ongoing  Pt glucose managed with sliding scale insulin.

## 2021-04-21 NOTE — PLAN OF CARE
Problem: Falls - Risk of:  Goal: Will remain free from falls  Description: Will remain free from falls  Outcome: Ongoing   Pt's bed is in lowest position, brake and bed exit alarm are on, call light and bedside table are within reach. Pt has camera in room for pt safety. Problem: Skin Integrity:  Goal: Absence of new skin breakdown  Description: Absence of new skin breakdown  Outcome: Ongoing   Pt is being turned every two hours and incontinence is being assessed with each turn. RN placed mepilex on pt's chest to avoid further skin breakdown. Problem: Confusion - Acute:  Goal: Mental status will be restored to baseline  Description: Mental status will be restored to baseline  Outcome: Ongoing   Pt is only oriented to self and disoriented to place, time, and situation. Problem: Injury - Risk of, Physical Injury:  Goal: Will remain free from falls  Description: Will remain free from falls  Outcome: Ongoing   Pt has remained free from falls during shift. Problem: Cardiac:  Goal: Hemodynamic stability will improve  Description: Hemodynamic stability will improve  Outcome: Ongoing   Pt received 500 ml LR bolus at beginning of shift and is currently on 75 ml/hr IVF. Pt's BP has improved since start of shift with last BP being 93/61. Problem: Respiratory:  Goal: Ability to maintain adequate ventilation will improve  Description: Ability to maintain adequate ventilation will improve  Outcome: Ongoing   Pt is currently on 2 L O2 and SpO2 has been WNL. Problem: Serum Glucose Level - Abnormal:  Goal: Ability to maintain appropriate glucose levels will improve  Description: Ability to maintain appropriate glucose levels will improve  Outcome: Ongoing   Pt's blood sugar is being checked AC/HS and is being covered by insulin lispro sliding scale.     Problem: Urinary Retention:  Goal: Urinary elimination within specified parameters  Description: Urinary elimination within specified parameters  Outcome: Ongoing   Pt was bladder scanned and it showed pt was retaining 950 ml. RN straight cath pt and 925 ml was removed. On PVR, it showed 0 ml. Will continue to assess pt's urinary elimination.

## 2021-04-21 NOTE — PROGRESS NOTES
aspects of the malnutrition assessment were withheld at this time.      NUTRITION DIAGNOSIS   Problem: Problem #1: Predicted inadequate energy intake   Etiology: Decreased ability to consume sufficient energy   Signs & Symptoms: Diet history of poor intake , Patient report of  and Weight loss     NUTRITION INTERVENTION  Food and/or Nutrient Delivery: Continue Current Diet, Start Oral Nutrition Supplement   Nutrition Education/Counseling: No recommendation at this time   Coordination of Nutrition Care: Continue to monitor while inpatient     NUTRITION MONITORING & EVALUATION:  Evaluation:Goals set   Goals:Goals: Pt to meet >50% of nutritional requirements from diet and ONS  Monitoring: Meal Intake , Pertinent Labs , Supplement Intake  or Weight      OBJECTIVE DATA: Significant to nutrition assessment  · Nutrition-Focused Physical Findings: not a good historian   · Wounds None      Past Medical History:   Diagnosis Date    [facility]chronic obstructive [facility] disease) ([facility])     Depressive disorder     Diabetes mellitus ([facility]         ANTHROPOMETRICS  Current Height: 5' 8\" (172.7 cm)  Current Weight: 144 lb (65.3 kg)  Appears stated  Admission weight: 144 lb (65.3 kg) Appears stated  Ideal Bodyweight 154 lb   Usual Bodyweight 170 b per previous RD assessment on 4/16    Weight Changes Unable to confirm as all previous wt Hx is stated        BMI BMI (Calculated): 21.9    Wt Readings from Last 50 Encounters:   04/20/21 144 lb (65.3 kg)   04/14/21 144 lb (65.3 kg)   03/23/21 185 lb (83.9 kg)   10/19/20 185 lb (83.9 kg)   02/13/20 201 lb (91.2 kg)       COMPARATIVE STANDARDS  Estimated Total Kcals/Day : 25-30  Current Bodyweight (65 kg) 5403-8759 kcal/day    Estimated Total Protein (g/day) : 1.2-1.4 Current Bodyweight (65 kg) 78-91 g/day  Estimated Daily Total Fluid (ml/day): 6582-9591 mL per day     Food / Nutrition-Related History  Pre-Admission / Home Diet:  Pre-Admission/Home Diet: General   Home Supplements / Herbals: none noted  Food Restrictions / Cultural Requests:    none noted    Current Nutrition Therapies   DIET CARB CONTROL; Dietary Nutrition Supplements: Diabetic Oral Supplement     PO Intake: 1-25% and 26-50% x2 meals  PO Supplement: None   PO Supplement Intake: None   IVF: Dex 5% at 75 ml/hr     NUTRITION RISK LEVEL: Risk Level:  Moderate     Stephanie Urena, [address] [place] [address]  Pasha:  [phone]  Office:  [phone]

## 2021-04-21 NOTE — FLOWSHEET NOTE
04/21/21 1508   Encounter Summary   Services provided to: Family  (Spoke with wife Judy Cuevas )   Referral/Consult From: Palliative Care   Support System Family members   Continue Visiting   (4/21, jay  apc )   Complexity of Encounter High   Length of Encounter 30 minutes   Advance Care Planning Yes   Routine   Type Initial   Assessment Calm; Approachable; Hopeful   Intervention Active listening;Explored feelings, thoughts, concerns;Nurtured hope   Outcome Comfort;Expressed gratitude;Engaged in conversation   Wife is going to converse with  PT about his resuscitation preference > APC  I am anticipating a call back  Staff [place], [place] .

## 2021-04-21 NOTE — PROGRESS NOTES
Occupational Therapy   Occupational Therapy Initial Assessment and Tx  Date: 2021   Patient Name: Andrey Monk  MRN: [id number]     : 1945    Date of Service: 2021    Discharge Recommendations: Andrey Monk scored a 14/24 on the AM-PAC ADL Inpatient form. Current research shows that an AM-PAC score of 17 or less is typically not associated with a discharge to the patient's home setting. Based on the patient's AM-PAC score and their current ADL deficits, it is recommended that the patient have 3-5 sessions per week of Occupational Therapy at d/c to increase the patient's independence. Please see assessment section for further patient specific details. If patient discharges prior to next session this note will serve as a discharge summary. Please see below for the latest assessment towards goals. OT Equipment Recommendations  Other: defer to next level of care    Assessment   Performance deficits / Impairments: Decreased functional mobility ; Decreased ADL status; Decreased ROM; Decreased strength;Decreased cognition;Decreased endurance;Decreased posture  Assessment: Pt originally from home with wife, recent admit psych/behavioral hospital for suicidal ideation believed to be d/t medication. Pt is a poor historian this date unable to obtain social fxl hist and PLOF. Pt reporting walking with walker at home. Pt most likely receiving assist from wife with ADLs and mobility. Pt currently requires 2 person assist for all mobility and max encouragement for participation this date. Pt requiring assist with bed mobility, fxl transfer bed to chair, and ADLs. Pt with decreased BUE ROM at baseline. Pt would benefit from cont skilled OT services while in acute care and at d/c. Will follow.   Treatment Diagnosis: impaired mobility, transfers, ADL  Prognosis: Good;Fair  Decision Making: Medium Complexity  OT Education: OT Role;Plan of Care  Patient Education: cont to reinforce  Barriers to Learning: cognition  REQUIRES OT FOLLOW UP: Yes  Activity Tolerance  Activity Tolerance: Patient limited by fatigue;Treatment limited secondary to decreased cognition  Activity Tolerance: pt requiring max encouragement for participation this date- lethargy, emotional/motivation  Safety Devices  Safety Devices in place: Yes  Type of devices: All fall risk precautions in place;Gait belt;Patient at risk for falls;Call light within reach; Chair alarm in place; Left in chair;Nurse notified           Patient Diagnosis(es): The primary encounter diagnosis was Urinary tract infection without hematuria, site unspecified. Diagnoses of Altered mental status, unspecified altered mental status type and Acute kidney injury [facility]) were also pertinent to this visit. has a past medical history of COPD (chronic obstructive pulmonary disease) ([facility] Depressive disorder, and Diabetes mellitus ([facility] has a past surgical history that includes Cardiac surgery and Cholecystectomy. Treatment Diagnosis: impaired mobility, transfers, ADL      Restrictions  Position Activity Restriction  Other position/activity restrictions: up as tolerated, suicide precautions    Subjective   General  Chart Reviewed: Yes  Patient assessed for rehabilitation services?: Yes  Additional Pertinent Hx: 67 y.o. male with history of COPD on 3 L home oxygen, status post CABG, diabetes mellitus type 2, depression came into ER from [facility] for respiratory distress. Patient is not a good historian he reports that he is having issues with his breathing for last 3 to 4 years. He is complaining of pain surgical scar from CABG. He is alert oriented to himself. Wife [place] at bedside reported that patient had a end-stage COPD. He was recently admitted to hospital for pneumonia was discharged home with Levaquin. At home patient was having homicidal thoughts it was related to serotonin syndrome and he was discharged to [facility]. On my evaluation patient is still having some suicidal ideations. Aid from [employer] at bedside reported that patient did not eat his breakfast this morning. Per report patient was noted to be hypoxic 79% on room air he was put on nonrebreather via EMS. Referring Practitioner: Rosalina Hanks MD  Diagnosis: respiratory failure  Subjective  Subjective: pt in bed upon arrival, agreeable to therapy session, lethargic, disoriented, reporting no pain, requesting to call wife end of session, assisted  Patient Currently in Pain: Denies  Pain Assessment  Pain Assessment: 0-10  Pain Level: 0  Vital Signs  Temp: 97.5 °F (36.4 °C)  Temp Source: Oral  Pulse: 63  Heart Rate Source: Monitor  Resp: 18  BP: 94/71  BP Location: Right upper arm  MAP (mmHg): 79  Patient Position: Semi fowlers  Level of Consciousness: Alert (0)  MEWS Score: 2  Patient Currently in Pain: Denies  Oxygen Therapy  SpO2: 94 %  Pulse Oximeter Device Mode: Intermittent  Pulse Oximeter Device Location: Finger  O2 Device: Nasal cannula  O2 Flow Rate (L/min): 2 L/min  Patient Observation  Observations: Mouth rinsed. Social/Functional History  Social/Functional History  Lives With: Spouse(wife)  Home Equipment: 4 wheeled walker  Ambulation Assistance: Independent(4ww)  Transfer Assistance: Independent  Additional Comments: Pt is lethargic/confused/poor historian this am.       Objective        Orientation  Overall Orientation Status: Impaired  Orientation Level: Disoriented to time;Disoriented to situation;Disoriented to place(oriented to name and , stating incorrect age 61 no 68 y/o. [facility].  Disoriented to time stating month Dec. and year .)     Balance  Sitting Balance: Stand by assistance  Standing Balance: (ModA x2)  Standing Balance  Time: ~1-2 min  Activity: sit<>stand, fxl transfer to chair from EOB  Functional Mobility  Functional - Mobility Device: Rolling Walker  Activity: Other  Functional Mobility Comments: Pt ModA x2 stand from chair to EOB, ModAx2 to MaxA x1 transfer to chair with RW  ADL  Feeding: Beverage management;Supervision  Grooming: Stand by assistance;Setup;Minimal assistance(wash face (SBA), comb hair (Mary))  UE Dressing: Minimal assistance(gown management)  LE Dressing: Maximum assistance  Toileting: Unable to assess(comment)(denied need)        Bed mobility  Supine to Sit: Maximum assistance  Scooting: Contact guard assistance  Comment: pt with low BP this date- per nursing instructed to monitor. BP this date during treatment-nurse notified:  87/56 lying in bed, 86/66 sitting EOB, 98/64 with attempt stand, to chair sitting reclined 93/58  Transfers  Sit to stand: Moderate assistance;2 Person assistance  Stand to sit: Moderate assistance;2 Person assistance  Transfer Comments: ModA x2 sit<>stand MaxA x1 bed to chair     Cognition  Overall Cognitive Status: Exceptions  Arousal/Alertness: Appropriate responses to stimuli  Following Commands: Follows one step commands with increased time; Follows one step commands with repetition  Attention Span: Attends with cues to redirect  Memory: Decreased recall of biographical Information;Decreased recall of recent events  Insights: Decreased awareness of deficits  Initiation: Requires cues for some  Sequencing: Requires cues for some  Cognition Comment: pt forgetful at times, emotional, requiring max encouragement for session, suicidal ideation per chart from medication                 LUE PROM (degrees)  LUE General PROM: BUE decreased ROM ~70 degrees shoulder ROM, ~WFL elbow, wrist, hand, able to reach head, Mary combing hair in the back  LUE Strength  LUE Strength Comment: not formally assessed s/t decreased ROM, appears decreased with functional activity BUE                   Plan   Plan  Times per week: 2-5      AM-PAC Score        AM-PAC Inpatient Daily Activity Raw Score: 14 (04/21/21 1010)  AM-PAC Inpatient ADL T-Scale Score : 33.39 (04/21/21 1010)  ADL Inpatient CMS 0-100% Score: 59.67

## 2021-04-21 NOTE — FLOWSHEET NOTE
04/21/21 1318   Encounter Summary   Services provided to: Family  (wife)   Referral/Consult From: Family   Continue Visiting   (es 4/21)   Complexity of Encounter Moderate   Length of Encounter 30 minutes   Advance Care Planning Yes   Routine   Type Initial   Assessment Approachable; Anxious; Fearful;Loneliness   Intervention Active listening;Explored feelings, thoughts, concerns;Explored coping resources;Nurtured hope;Prayer;Contacted support as requested per patient/family request;Discussed belief system/Oriental orthodox practices/key;Discussed illness/injury and it's impact   Who? Chaplain Maura Bragg   [facility] Cortez Stewart is working with the patient already   At Request Of wife Dayanna   Tejinder Engaged in conversation;Expressed feelings/needs/concerns;Venting emotion   Patient's wife called to request that we do [facility] paperwork for her . I explained that we can do that if her  has capacity. She also said that she feels lonely in her situation. We talked about her feelings. I provided a listening ear and prayer. Randolph Bravo had already started working on an Epic consult related to this patient, so I referred to Randolph Bravo for follow up. Once Randolph Bravo has more information about pt's capacity, he will call pt's wife to tell her that [facility] paperwork was completed or could not be completed.

## 2021-04-21 NOTE — FLOWSHEET NOTE
04/21/21 1532   Encounter Summary   Services provided to: Family  (wife Laila Jenkins )   Referral/Consult From: Family   Support System Family members   Continue Visiting   (4/21, jay )   Complexity of Encounter High   Length of Encounter 30 minutes   Advance Care Planning Yes   Routine   Type Follow up   Assessment Calm; Approachable; Hopeful   Intervention Active listening;Explored feelings, thoughts, concerns;Nurtured hope   Outcome Comfort;Expressed gratitude   Sacraments   Sacrament of Sick-Anointing Family requested anointing   Staff [place], [place]

## 2021-04-21 NOTE — ACP (ADVANCE CARE PLANNING)
portable DNR orders.     Length of ACP Conversation in minutes:      Conversation Outcomes:  [x] ACP discussion completed  [] Existing advance directive reviewed with patient; no changes to patient's previously recorded wishes  [] New Advance Directive completed  [] Portable Do Not Rescitate prepared for Provider review and signature  [] POLST/POST/MOLST/MOST prepared for Provider review and signature      Follow-up plan:    [] Schedule follow-up conversation to continue planning  [] Referred individual to Provider for additional questions/concerns   [] Advised patient/agent/surrogate to review completed ACP document and update if needed with changes in condition, patient preferences or care setting    [x] This note routed to one or more involved healthcare providers

## 2021-04-21 NOTE — PROGRESS NOTES
Physical Therapy    Facility/Department: [place] [address] [facility]  Initial Assessment    NAME: Radha Barnett  : 1945  MRN: [id number]    Date of Service: 2021    Discharge Recommendations:Erick Rose scored a 10/24 on the AM-PAC short mobility form. Current research shows that an AM-PAC score of 17 or less is typically not associated with a discharge to the patient's home setting. Based on the patient's AM-PAC score and their current functional mobility deficits, it is recommended that the patient have 3-5 sessions per week of Physical Therapy at d/c to increase the patient's independence. Please see assessment section for further patient specific details. If patient discharges prior to next session this note will serve as a discharge summary. Please see below for the latest assessment towards goals. PT Equipment Recommendations  Equipment Needed: (defer)    Assessment   Assessment: 66 yo admitted 21 from [facility] for respiratory failure. Pt confused and lethargic this am and demo poor mobility. Per pt/chart, he is originally from home with wife. He required assist x2 for transfers and max assist for gait/bed mobility this am. Pt hoping to go home with wife. Pt would benefit from continued skilled IP PT at discharge to  maximize his functional independence prior to d/c home. Treatment Diagnosis: mobility impairment due to respiratory failure  Decision Making: Medium Complexity  Patient Education: Pt educated on PT role, importance of OOB mobility, need to call for assist to get up and he verbalized partial understanding and will need reinforcement. REQUIRES PT FOLLOW UP: Yes  Activity Tolerance  Activity Tolerance: Patient limited by endurance; Patient limited by cognitive status       Patient Diagnosis(es): The primary encounter diagnosis was Urinary tract infection without hematuria, site unspecified.  Diagnoses of Altered mental status, unspecified altered mental status type and Acute kidney injury [facility]) were also pertinent to this visit. has a past medical history of COPD (chronic obstructive pulmonary disease) ([facility] Depressive disorder, and Diabetes mellitus ([facility] has a past surgical history that includes Cardiac surgery and Cholecystectomy. Restrictions  Position Activity Restriction  Other position/activity restrictions: up as tolerated, suicide precautions     Vision/Hearing  Vision: Impaired  Vision Exceptions: Wears glasses for reading  Hearing: Within functional limits       Subjective  General  Chart Reviewed: Yes  Patient assessed for rehabilitation services?: Yes  Additional Pertinent Hx: 67 y.o. male with history of COPD on 3 L home oxygen, status post CABG, diabetes mellitus type 2, depression came into ER 21 from [facility] for respiratory distress. CXR positive infiltrate, UTI positive; psych consult pending-suicidal ideation. Family / Caregiver Present: No  Diagnosis: acute on chronic repiratory failure  Follows Commands: Impaired  Subjective  Subjective: Pt found supine in bed and agreeable to PT with max encouragement. \" I just need to sleep.  \"  Pain Screening  Patient Currently in Pain: Denies         Orientation  Orientation  Overall Orientation Status: Impaired(oriented to name/ and  \"hospital\")     Social/Functional History  Social/Functional History  Lives With: Spouse(wife)  Home Equipment: 4 wheeled walker  Ambulation Assistance: Independent(4ww)  Transfer Assistance: Independent  Additional Comments: Pt is lethargic/confused/poor historian this am.         Objective          AROM RLE (degrees)  RLE AROM: WFL  AROM LLE (degrees)  LLE AROM : WFL     Strength RLE  Strength RLE: (3/5 grossly throughout)  Strength LLE  Strength LLE: (3/5 grossly throughout)        Bed mobility  Supine to Sit: Maximum assistance(with max vc/encouragement; pt lethargic)  Scooting: Contact guard assistance(with max vc and encouragement)   Orthostatics: supine 87/56, EOB 86/66, stand attempt 98/64, reclined in chair 93/58  (RN aware)    Transfers  Sit to Stand: 2 Person Assistance(mod assist x2 from raised ht bed with max encouragement/vc; x2 trials)  Stand to sit: 2 Person Assistance(mod assist x2 with max vc for hand placement; x2 trials; uncontrolled descension)     Ambulation  Device: Rolling Walker  Other Apparatus: O2(2L)  Assistance: Maximum assistance  Quality of Gait: forward posture with posterior lean; slow tyler with decreased step length/height; max encouragement to stay on task  Distance: 4 steps to chair              Plan   Plan  Times per week: 2-5  Current Treatment Recommendations: Strengthening, Transfer Training, Endurance Training, Functional Mobility Training, Balance Training, Gait Training  Safety Devices  Type of devices: Nurse notified, Call light within reach, Chair alarm in place, Left in chair, Telesitter in use(pt reclined)           AM-PAC Score  AM-PAC Inpatient Mobility Raw Score : 10 (04/21/21 0957)  AM-PAC Inpatient T-Scale Score : 32.29 (04/21/21 0957)  Mobility Inpatient CMS 0-100% Score: 76.75 (04/21/21 0957)  Mobility Inpatient CMS G-Code Modifier : CL (04/21/21 0957)          Goals  Short term goals  Time Frame for Short term goals: discharge  Short term goal 1: sit to/from supine supervision  Short term goal 2: sit to/from stand supervision  Short term goal 3: ambulate 20 ft with rolling walker supervision  Patient Goals   Patient goals : return home with wife       Therapy Time   Individual Concurrent Group Co-treatment   Time In 0901         Time Out 0940         Minutes 39           Timed Code Treatment Minutes: 29      Total Treatment Minutes:  Alanna 94, PT

## 2021-04-21 NOTE — CARE COORDINATION
Case Management Assessment           Initial Evaluation                Date / Time of Evaluation: 4/21/2021 2:45 PM                 Assessment Completed by: Laurie Aquino    Patient Name: Alda Cole     YOB: 1945  Diagnosis: Acute on chronic respiratory failure [facility]) [J96.20]     Date / Time: 4/20/2021 12:53 PM    Patient Admission Status: Inpatient    If patient is discharged prior to next notation, then this note serves as note for discharge by case management. Current PCP: Hamilton Rivero MD  Clinic Patient: No    Chart Reviewed: Yes  Patient/ Family Interviewed: Yes    Initial assessment completed at bedside with: pts wife over the phone    Hospitalization in the last 30 days: Yes    Emergency Contacts:  Extended Emergency Contact Information  Primary Emergency Contact: Anjelica Puente [address] Phone: [phone]  Work Phone: [phone]  Mobile Phone: [phone]  Relation: Spouse  Secondary Emergency Contact: Radha Sorto [address] Phone: [phone]  Relation: Child    Advance Directives:   Code Status: [address] [facility] [address]: No      Financial  Payor: Cordelia Howell / Plan: Gurinder Allred PLUS HMO / Product Type: *No Product type* /     Pre-cert required for SNF: Yes    Pharmacy    [facility], [address] [place] [address] [phone] [facility] [phone] [address]  [address] [address]  Phone: [phone] Fax: [phone]      Potential assistance Purchasing Medications: Potential Assistance Purchasing Medications: No  Does Patient want to participate in local refill/ meds to beds program?: No    Meds To Beds General Rules:  1. Can ONLY be done Monday- Friday between 8:30am-5pm  2. Prescription(s) must be in pharmacy by 3pm to be filled same day  3. Copy of patient's insurance/ prescription drug card and patient face sheet must be sent along with the prescription(s)  4. Cost of Rx cannot be added to hospital bill. If financial assistance is needed, please contact unit  or ;  or  CANNOT provide pharmacy voucher for patients co-pays  5. Patients can then  the prescription on their way out of the hospital at discharge, or pharmacy can deliver to the bedside if staff is available. (payment due at time of pick-up or delivery - cash, check, or card accepted)     Able to afford home medications/ co-pay costs: Yes    ADLS  Support Systems: Spouse/Significant Other    PT AM-PAC: 10 /24  OT AM-PAC: 14 /24    [place]: From home with wife in apt, but [facility]  Steps: 7 steps to enter    Plans to RETURN to current housing: No  Barriers to RETURNING to current housing: medical complications    [address]  Currently ACTIVE with [employer] [address]: No  Home Care Agency: Not Applicable    Currently ACTIVE with [facility] [place] [facility] Aging: No    Durable Medical Equipment  DME Provider: n/a  Equipment: cane    Home Oxygen and [address] [place] prior to admission: Yes  [address]: [facility]  Phone: [phone]   Other Respiratory Equipment: no    Informed of need to bring portable home O2 tank on day of DISCHARGE for nursing to connect prior to leaving: Yes    DISCHARGE PLAN:  Disposition: [employer] for discharge: EMS transportation     Factors facilitating achievement of predicted outcomes: Family support    Barriers to discharge: Medical complications and Medication managment    Additional Case Management Notes: CM spoke with wife over the phone. Pt is from [facility] [place] for aggressive behavior. Pt was recently at [facility] for pneumonia. Plan is for pt to return to [facility] [place] . COVID rule out, if patient comes back negative, wife would like pt moved so she can visit him.     The Plan for Transition of Care is related to the following treatment goals of Acute on chronic respiratory failure ([facility] [address] [J96.20]    The Patient and/or patient representative [place] and his family were provided with a choice of provider and agrees with the discharge plan Yes    Freedom of choice list was provided with basic dialogue that supports the patient's individualized plan of care/goals and shares the quality data associated with the providers.  Yes    Care Transition patient: No    Bruce Carlton RN  [facility]  Case Management Department  Ph: [phone]   Fax: [phone]

## 2021-04-21 NOTE — PROGRESS NOTES
Pt's morning BP was 84/56 MAP 62 and lactic acid was 2.2. MD notified. Ordered 500 ml LR bolus to infuse over 4 hours. Bladder scanned pt and showed 0 ml.

## 2021-04-21 NOTE — PROGRESS NOTES
Hospital Medicine Care  Progress Note      Chief complain; Shortness of Breath and Altered Mental Status              Subjective: This morning eating breakfast  Denies any dyspnea  Denies any chest pain  His only complaint is trouble eating as he is missing dentures  Denies any lightheadedness or dizziness. Review of Systems:     Review of Systems as mentioned above, other ros is negative. Objective:       Medications        Scheduled Meds:   cefepime  1,000 mg Intravenous Q12H    aspirin  325 mg Oral Daily    levothyroxine  88 mcg Oral Daily    sodium chloride flush  5-40 mL Intravenous 2 times per day    enoxaparin  40 mg Subcutaneous Daily    sodium chloride flush  5-40 mL Intravenous 2 times per day    predniSONE  40 mg Oral Daily    fluconazole  100 mg Oral Daily    insulin lispro  0-12 Units Subcutaneous TID WC    insulin lispro  0-6 Units Subcutaneous Nightly    escitalopram  10 mg Oral Daily    risperiDONE  0.25 mg Oral Daily    glycopyrrolate-formoterol  2 puff Inhalation BID    albuterol-ipratropium  1 puff Inhalation TID    fluticasone  1 puff Inhalation BID     Continuous Infusions:   sodium chloride      dextrose 5 % and 0.45 % NaCl 75 mL/hr at 04/21/21 1017     PRN Meds:.sodium chloride flush, sodium chloride, sodium chloride flush, promethazine **OR** ondansetron, polyethylene glycol, acetaminophen **OR** acetaminophen, perflutren lipid microspheres, traZODone      Allergies  Allergies   Allergen Reactions    Dilaudid [Hydromorphone Hcl]     Morphine          Vitals:    04/21/21 0339 04/21/21 0600 04/21/21 0815 04/21/21 0829   BP: (!) 84/56   94/71   Pulse: 73 60  63   Resp: 18   18   Temp: 97.3 °F (36.3 °C)   97.5 °F (36.4 °C)   TempSrc: Oral   Oral   SpO2: 93%  92% 94%   Weight:       Height:             Physical exam:         Physical Exam  Constitutional:       General: He is not in acute distress. Appearance: Normal appearance. He is ill-appearing.    HENT: Head: Normocephalic and atraumatic. Cardiovascular:      Rate and Rhythm: Normal rate and regular rhythm. Pulses: Normal pulses. Pulmonary:      Effort: Pulmonary effort is normal.      Breath sounds: Rales present. Abdominal:      General: Abdomen is flat. Palpations: Abdomen is soft. Musculoskeletal:         General: No swelling or tenderness. Skin:     General: Skin is warm and dry. Capillary Refill: Capillary refill takes less than 2 seconds. Neurological:      General: No focal deficit present. Mental Status: He is alert and oriented to person, place, and time. Psychiatric:         Mood and Affect: Mood normal.         Behavior: Behavior normal.               Labs      Recent Labs     04/20/21  1326   WBC 18.0*   HGB 13.9   HCT 43.2      .5*        Recent Labs     04/20/21  1326 04/21/21  0447   * 144   K 4.7 4.7    108   CO2 21 23   PHOS  --  4.4   BUN 25* 30*   CREATININE 1.6* 1.6*       Recent Labs     04/20/21  1326 04/21/21  0447   * 204*   * 145*   BILIDIR 1.0* 0.5*   BILITOT 2.1* 0.9   ALKPHOS 399* 251*       No results for input(s): MG in the last 72 hours. Radiology  XR CHEST PORTABLE   Final Result      1. Decreased small left pleural effusion. 2. Chronic interstitial lung disease/pulmonary fibrosis. Assessment and Plan: Active Problems:    Acute on chronic respiratory failure (HCC)  Resolved Problems:    * No resolved hospital problems. *     Acute on chronic respiratory failure  Improved  Baseline oxygen 2 to 3 L  ? COPD flare  Continue with the prednisone. Continue with cefepime      Agitation  Could be related to fluoroquinolones. Antibiotics changed. On Risperdal.  Psychiatry consulted. Acute kidney injury  Baseline is 1.7  Continue with IV fluids  Bladder scan every shift    Abnormal LFT  Improving  Thought secondary to antibiotics  Antibiotic changed.     Urinary tract infection  With a leukocytosis  Pyuria  Follow-up on the urine culture  Continue with cefepime. Hyperglycemia  Secondary to steroids  Start low-dose Lantus  Hypoglycemia protocol. 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      Mitchell Talley

## 2021-04-21 NOTE — PROGRESS NOTES
Cefepime 2g IV q12h ordered for patient. This medication is renally eliminated. Will change to cefepime 1g IV q12h per renal dose adjustment policy. Usual dosing for UTI is 2g IV q12h, renal dose adjusted for CrCL 30-60. Estimated Creatinine Clearance: 36 mL/min (A) (based on SCr of 1.6 mg/dL (H)). Pharmacy will continue to monitor renal function and adjust dose as necessary. Please call with any questions. Thanks!   Thien Cao, PharmD, [address] [place] [address] Pharmacy: [phone]  [address] [place] [address] wireless: [phone]  4/21/2021 10:49 AM

## 2021-04-21 NOTE — FLOWSHEET NOTE
04/21/21 1436   Encounter Summary   Services provided to: Patient not available   Referral/Consult From: Patient  Rey Escobedo RN)   Continue Visiting   (4/21, jay  APC )   Complexity of Encounter Moderate   Length of Encounter 30 minutes   Advance Care Planning Yes   Routine   Type Follow up   Advance Directives (For Healthcare)   Healthcare Directive No, patient does not have an advance directive for healthcare treatment   Type of Healthcare Directive Durable power of  for health care   Information on Healthcare Directives Requested Yes   Patient Requests Assistance Yes, referral made to    Advance Directives Documents given   I spoke with PT's RN, J Luis Mac about the [facility] consult (also an APC) and he advised me that the PT is not decisional for a HCPOA to be completed. A HCPOA packet was provided for the PT to consider when he is able. I then made a call to PT wife for APC and HCPOA but it went to voicemail.   Staff Marcus Leon MA

## 2021-04-21 NOTE — PROGRESS NOTES
Bladder scanned pt and it showed 952 ml, MD notified. Straight catheterized pt and emptied 925 ml urine. Bladder scanned pt post-void and it showed 0 ml. Will continue to assess pt's urinary retention.

## 2021-04-22 NOTE — PROGRESS NOTES
Per dayshift RN, Urology wants to continue to straight cath pt d/t RN not being able to successfully insert indwelling urinary catheter x2. This RN bladder scanned pt and it showed 554 ml. Straight cath pt and emptied 500 ml. Post-cath bladder scan showed 0 ml. Will continue to assess pt's urinary retention.

## 2021-04-22 NOTE — PLAN OF CARE
Problem: Falls - Risk of:  Goal: Will remain free from falls  Description: Will remain free from falls  4/22/2021 0402 by Monda   Outcome: Ongoing   Pt's bed is in lowest position, brake and bed exit alarm are on, call light and bed side table are within reach. Problem: Skin Integrity:  Goal: Absence of new skin breakdown  Description: Absence of new skin breakdown  4/22/2021 0402 by Monda   Outcome: Ongoing   Pt is being turned every two hours and incontinence is being assessed with each turn. Problem: Confusion - Acute:  Goal: Mental status will be restored to baseline  Description: Mental status will be restored to baseline  4/22/2021 0402 by Monda   Outcome: Ongoing   Pt is alert and oriented only to self. Pt is disoriented to place, time, and situation. Pt is able to hold conversation with RN, however. Problem: Injury - Risk of, Physical Injury:  Goal: Will remain free from falls  Description: Will remain free from falls  4/22/2021 0402 by Monda   Outcome: Ongoing   Pt remained free from falls during shift. Problem: Cardiac:  Goal: Hemodynamic stability will improve  Description: Hemodynamic stability will improve  Outcome: Ongoing   Pt is on continuous telemetry and heart rhythm has been NSR/SB and atrial fibrillation. Problem: Respiratory:  Goal: Ability to maintain adequate ventilation will improve  Description: Ability to maintain adequate ventilation will improve  Outcome: Ongoing   Pt is on 2 L NC and SpO2 has been WNL during shift. Problem: Serum Glucose Level - Abnormal:  Goal: Ability to maintain appropriate glucose levels will improve  Description: Ability to maintain appropriate glucose levels will improve  Outcome: Ongoing   Pt's blood sugar is being checked AC/HS and is being covered with insulin lispro and insulin glargine.     Problem: Urinary Retention:  Goal: Urinary elimination within specified parameters  Description: Urinary elimination within

## 2021-04-22 NOTE — CARE COORDINATION
Case Management Assessment           Daily Note                 Date/ Time of Note: 4/22/2021 4:22 PM         Note completed by: Thalia Rizzo    Patient Name: Dolores Steward  YOB: 1945    Diagnosis:Acute on chronic respiratory failure ([address] [J96.20]  Patient Admission Status: Inpatient    Date of Admission:4/20/2021 12:53 PM Length of Stay: 2 GLOS: GMLOS: 3.6    Current Plan of Care: IV abx, hep gtt  ________________________________________________________________________________________  PT AM-PAC: 10 / 24 per last evaluation on: 4.21    OT AM-PAC: 14 / 24 per last evaluation on: 4.21    DME Needs for discharge:   ________________________________________________________________________________________  Discharge Plan: To Be Determined DUE TO: possible IP psych or SNF    Tentative discharge date: TBD    Current barriers to discharge: medical clearance      ________________________________________________________________________________________  Case Management Notes:  JAMES spoke with wife, Aviva Walters over the phone. She is unsure if she will be able to provide the hands on care that he needs if he doesn't go back to psych facility. CM left list in room. Delmar Rudolph will talk with pt's children regarding possible SNF placement. CM will follow up in the morning. Reena Cedeno and his family were provided with choice of provider; he and his family are in agreement with the discharge plan.     Care Transition Patient: Shelbi Rizzo RN  [facility]  Case Management Department  Ph: [phone]  Fax: [phone]

## 2021-04-22 NOTE — CONSULTS
(RISPERDAL) 0.25 MG tablet Take 0.25 mg by mouth daily   Yes Historical Provider, MD   levoFLOXacin (LEVAQUIN) 500 MG tablet Take 1 tablet by mouth daily for 10 days  Patient taking differently: Take 500 mg by mouth daily For 10 days (last dose 4/25) 4/16/21 4/26/21 Yes Robert Angulo MD   fluticasone-umeclidin-vilant (TRELEGY ELLIPTA) 100-62.5-25 MCG/INH AEPB Inhale 1 puff into the lungs daily   Yes Historical Provider, MD   traZODone (DESYREL) 50 MG tablet Take 50 mg by mouth nightly as needed for Sleep    Historical Provider, MD   magnesium hydroxide (MILK OF MAGNESIA) 400 MG/5ML suspension Take by mouth daily as needed for Constipation    Historical Provider, MD   aluminum & magnesium hydroxide-simethicone (MAG-AL PLUS) 200-200-20 MG/5ML SUSP suspension Take 30 mLs by mouth every 4 hours as needed for Indigestion    Historical Provider, MD   acetaminophen (TYLENOL) 325 MG tablet Take 650 mg by mouth every 6 hours as needed (headache) Max dose 3g in 24 hours    Historical Provider, MD          Inpatient Medications:   tamsulosin  0.4 mg Oral Daily    cefepime  1,000 mg Intravenous Q12H    insulin glargine  5 Units Subcutaneous Nightly    aspirin  325 mg Oral Daily    levothyroxine  88 mcg Oral Daily    sodium chloride flush  5-40 mL Intravenous 2 times per day    enoxaparin  40 mg Subcutaneous Daily    sodium chloride flush  5-40 mL Intravenous 2 times per day    fluconazole  100 mg Oral Daily    insulin lispro  0-12 Units Subcutaneous TID WC    insulin lispro  0-6 Units Subcutaneous Nightly    escitalopram  10 mg Oral Daily    risperiDONE  0.25 mg Oral Daily    glycopyrrolate-formoterol  2 puff Inhalation BID    albuterol-ipratropium  1 puff Inhalation TID    fluticasone  1 puff Inhalation BID       IV drips:   sodium chloride      dextrose      sodium chloride         PRN:  glucose, dextrose, glucagon (rDNA), dextrose, lidocaine, sodium chloride flush, sodium chloride, sodium chloride flush, promethazine **OR** ondansetron, polyethylene glycol, acetaminophen **OR** acetaminophen, perflutren lipid microspheres, traZODone    Allergy:     Dilaudid [hydromorphone hcl] and Morphine       Review of Systems:     All 12 point review of symptoms completed. Pertinent positives, negatives identified in the HPI      Physical Examination:     Vitals:    04/22/21 0418 04/22/21 0600 04/22/21 0818 04/22/21 0842   BP: 107/73  107/66    Pulse: 61 64 64    Resp: 16  18 18   Temp: 98.1 °F (36.7 °C)  97.7 °F (36.5 °C)    TempSrc: Oral  Axillary    SpO2: 98%  98% 93%   Weight:       Height:           Wt Readings from Last 3 Encounters:   04/20/21 144 lb (65.3 kg)   04/14/21 144 lb (65.3 kg)   03/23/21 185 lb (83.9 kg)       Objective      General Appearance:  Alert, cooperative, no distress, appears stated age Appropriate weight   Head:  Normocephalic, without obvious abnormality, atraumatic   Eyes:  PERRL, conjunctiva/corneas clear EOM intact  Ears normal   Throat no lesions       Nose: Nares normal, no drainage or sinus tenderness   Throat: Lips, mucosa, and tongue normal   Neck: Supple, symmetrical, trachea midline, no adenopathy, thyroid: not enlarged, symmetric, no tenderness/mass/nodules, no carotid bruit or JVD       Lungs:   Clear to auscultation bilaterally, respirations unlabored   Chest Wall:  No tenderness or deformity   Heart:  Regular rate and rhythm, S1, S2 normal, no murmur, rub or gallop PMI intact   Abdomen:   Soft, non-tender, bowel sounds active all four quadrants,  no masses, no organomegaly       Extremities: Extremities normal, atraumatic, no cyanosis or edema   Pulses: 2+ and symmetric   Skin: Sternal surgical scar.     Pysch: Normal mood and affect   Neurologic: Normal gross motor and sensory exam.  Cranial nerves intact        Labs:     Recent Labs     04/20/21  1326 04/21/21  0447 04/22/21  0825   * 144 140   K 4.7 4.7 3.9   BUN 25* 30* 28*   CREATININE 1.6* 1.6* 1.5*    108 106 CO2 21 23 21   GLUCOSE 115* 290* 253*   CALCIUM 9.4 8.2* 7.7*     Recent Labs     04/20/21  1326   WBC 18.0*   HGB 13.9   HCT 43.2      .5*     No results for input(s): CHOLTOT, TRIG, HDL in the last 72 hours. Invalid input(s): LIPIDCOMM, CHOLHDL, VLDCHOL, LDL  No results for input(s): PTT, INR in the last 72 hours. Invalid input(s): PT  Recent Labs     04/20/21  1326 04/21/21  0447   TROPONINI 0.08* 0.08*     No results for input(s): BNP in the last 72 hours. No results for input(s): TSH in the last 72 hours. No results for input(s): CHOL, HDL, LDLCALC, TRIG in the last 72 hours.]    Lab Results   Component Value Date    TROPONINI 0.08 (H) 04/21/2021         Imaging:     I personally reviewed imaging studies including CXR, Stress test, TTE/ONELIA. Last ECG (if available) - EKG:  I have reviewed EKG with the following interpretation  NSR. No gross ST-T changes. Assessment / Plan:     Troponemia  -Pt presented to hospital with SOB. Required supplemental O2. He was also noted to have UTI, SHAWANDA. He does not have chest pain, palpitations and his SOB has now improved. His EKG without ST-T changes. Low suspicion for ACS at this point. His troponin elevation peaked at 0.08 likely 2/2 to demand, SHAWANDA. His dyspnea and hypoxia could be from new onset HF. We will await the results of the echocardiogram. Does not require any urgent cardiac intervention at this point in time. Tobacco use was discussed with the patient and educated on the negative effects. I have asked the patient to not utilize these agents. Thank you for allowing to us to participate in the care or [facility] [place]. Further evaluation will be based upon the patient's clinical course and testing results. I have spent 40 minutes of face to face time with the patient with more than 50% spent counseling and coordinating care. All questions and concerns were addressed to the patient/family.  Alternatives to my treatment were

## 2021-04-22 NOTE — PLAN OF CARE
will increase  Outcome: Ongoing     Problem: Psychomotor Activity - Altered:  Goal: Absence of psychomotor disturbance signs and symptoms  Description: Absence of psychomotor disturbance signs and symptoms  Outcome: Ongoing     Problem: Sensory Perception - Impaired:  Goal: Demonstrations of improved sensory functioning will increase  Description: Demonstrations of improved sensory functioning will increase  Outcome: Ongoing  Goal: Decrease in sensory misperception frequency  Description: Decrease in sensory misperception frequency  Outcome: Ongoing  Goal: Able to refrain from responding to false sensory perceptions  Description: Able to refrain from responding to false sensory perceptions  Outcome: Ongoing  Goal: Demonstrates accurate environmental perceptions  Description: Demonstrates accurate environmental perceptions  Outcome: Ongoing  Goal: Able to distinguish between reality-based and nonreality-based thinking  Description: Able to distinguish between reality-based and nonreality-based thinking  Outcome: Ongoing     Problem:  Activity:  Goal: Fatigue will decrease  Description: Fatigue will decrease  Outcome: Ongoing     Problem: Cardiac:  Goal: Hemodynamic stability will improve  Description: Hemodynamic stability will improve  Outcome: Ongoing     Problem: Coping:  Goal: Level of anxiety will decrease  Description: Level of anxiety will decrease  Outcome: Ongoing  Goal: Ability to cope will improve  Description: Ability to cope will improve  Outcome: Ongoing  Goal: Ability to establish a method of communication will improve  Description: Ability to establish a method of communication will improve  Outcome: Ongoing     Problem: Nutritional:  Goal: Consumption of the prescribed amount of daily calories will improve  Description: Consumption of the prescribed amount of daily calories will improve  Outcome: Ongoing     Problem: Respiratory:  Goal: Ability to maintain a clear airway will improve  Description:

## 2021-04-22 NOTE — PROGRESS NOTES
Hospital Medicine Care  Progress Note      Chief complain; Shortness of Breath and Altered Mental Status              Subjective:     Denies any chest pain  Denies any shortness of breath  Denies any nausea or vomiting. Denies any lightheadedness    Review of Systems:     Review of Systems as mentioned above, other ros is negative. Objective:       Medications        Scheduled Meds:   tamsulosin  0.4 mg Oral Daily    cefepime  1,000 mg Intravenous Q12H    insulin glargine  5 Units Subcutaneous Nightly    aspirin  325 mg Oral Daily    levothyroxine  88 mcg Oral Daily    sodium chloride flush  5-40 mL Intravenous 2 times per day    enoxaparin  40 mg Subcutaneous Daily    sodium chloride flush  5-40 mL Intravenous 2 times per day    fluconazole  100 mg Oral Daily    insulin lispro  0-12 Units Subcutaneous TID WC    insulin lispro  0-6 Units Subcutaneous Nightly    escitalopram  10 mg Oral Daily    risperiDONE  0.25 mg Oral Daily    glycopyrrolate-formoterol  2 puff Inhalation BID    albuterol-ipratropium  1 puff Inhalation TID    fluticasone  1 puff Inhalation BID     Continuous Infusions:   sodium chloride 100 mL/hr at 04/22/21 0925    dextrose      sodium chloride       PRN Meds:.glucose, dextrose, glucagon (rDNA), dextrose, lidocaine, sodium chloride flush, sodium chloride, sodium chloride flush, promethazine **OR** ondansetron, polyethylene glycol, acetaminophen **OR** acetaminophen, traZODone      Allergies  Allergies   Allergen Reactions    Dilaudid [Hydromorphone Hcl]     Morphine          Vitals:    04/22/21 0418 04/22/21 0600 04/22/21 0818 04/22/21 0842   BP: 107/73  107/66    Pulse: 61 64 64    Resp: 16  18 18   Temp: 98.1 °F (36.7 °C)  97.7 °F (36.5 °C)    TempSrc: Oral  Axillary    SpO2: 98%  98% 93%   Weight:       Height:             Physical exam:         Physical Exam  Constitutional:       General: He is not in acute distress. Appearance: Normal appearance.  He is ill-appearing. HENT:      Head: Normocephalic and atraumatic. Cardiovascular:      Rate and Rhythm: Normal rate and regular rhythm. Pulses: Normal pulses. Pulmonary:      Effort: Pulmonary effort is normal.      Breath sounds: No rales. Abdominal:      General: Abdomen is flat. Palpations: Abdomen is soft. Musculoskeletal:         General: No swelling or tenderness. Skin:     General: Skin is warm and dry. Capillary Refill: Capillary refill takes less than 2 seconds. Neurological:      General: No focal deficit present. Mental Status: He is alert and oriented to person, place, and time. Psychiatric:         Mood and Affect: Mood normal.         Behavior: Behavior normal.               Labs      Recent Labs     04/20/21  1326   WBC 18.0*   HGB 13.9   HCT 43.2      .5*        Recent Labs     04/20/21  1326 04/21/21  0447 04/22/21  0825   * 144 140   K 4.7 4.7 3.9    108 106   CO2 21 23 21   PHOS  --  4.4 2.9   BUN 25* 30* 28*   CREATININE 1.6* 1.6* 1.5*       Recent Labs     04/20/21  1326 04/21/21  0447   * 204*   * 145*   BILIDIR 1.0* 0.5*   BILITOT 2.1* 0.9   ALKPHOS 399* 251*       No results for input(s): MG in the last 72 hours. Radiology  XR CHEST PORTABLE   Final Result      1. Decreased small left pleural effusion. 2. Chronic interstitial lung disease/pulmonary fibrosis. Assessment and Plan: Active Problems:    Acute on chronic respiratory failure (HCC)  Resolved Problems:    * No resolved hospital problems. *     Acute on chronic respiratory failure  Improved  Baseline oxygen 2 to 3 L  ? Aspiration episode and bronchospasm  Off steroids  Continue antibiotics  Pending white count. Agitation  Could be related to fluoroquinolones,steroids  Antibiotics changed. Psychiatry consulted.   QTC is prolonged, DC risperidone    Acute kidney injury  Baseline is 0.7  Continue with IV fluids as lactate elevated   urinary

## 2021-04-22 NOTE — CONSULTS
Urology Attending Consult Note      Reason for Consultation: AUR    History: 66 yo M unable to provide  history with multiple medical issues admitted to [facility] with respiratory distress. He has been having issues with urination requiring ISC as well as incontinence. Started on flomax today. Family History, Social History, Review of Systems:  Reviewed and agreed to as per chart    Vitals:  /66   Pulse 64   Temp 97.7 °F (36.5 °C) (Axillary)   Resp 18   Ht 5' 8\" (1.727 m)   Wt 144 lb (65.3 kg)   SpO2 93%   BMI 21.90 kg/m²   Temp  Av.9 °F (36.6 °C)  Min: 97.6 °F (36.4 °C)  Max: 98.1 °F (36.7 °C)    Intake/Output Summary (Last 24 hours) at 2021 1040  Last data filed at 2021 5052  Gross per 24 hour   Intake 3067 ml   Output 500 ml   Net 2567 ml         Physical:   Well developed, well nourished in no acute distress   Mood indicates no abnormalities. Pt doesnt appear depressed   Orientated to time and place   Neck is supple, trachea is midline   Respiratory effort is normal   Cardiovascular show no extremity swelling   Abdomen no masses or hernias are palpated, there is no tenderness. Liver and Spleen appear normal.   Skin show no abnormal lesions   Lymph nodes are not palpated in the inguinal, neck, or axillary area.      Male :   Voiding clear urine      Labs:  WBC:    Lab Results   Component Value Date    WBC 18.0 2021     Hemoglobin/Hematocrit:    Lab Results   Component Value Date    HGB 13.9 2021    HCT 43.2 2021     BMP:    Lab Results   Component Value Date     2021    K 3.9 2021    K 4.7 2021     2021    CO2 21 2021    BUN 28 2021    LABALBU 2.2 2021    CREATININE 1.5 2021    CALCIUM 7.7 2021    GFRAA 55 2021    LABGLOM 45 2021     PT/INR:    Lab Results   Component Value Date    PROTIME 13.7 2021    INR 1.18 2021     PTT:    Lab Results   Component Value Date    APTT 33.6 04/14/2021   [APTT    Urinalysis: Positive    Urine Culture: Yeast    Antibiotic Therapy: Maxipime, fluconazole    Imaging: CT ABD PELVIS 4/14/21  Impression   1. No CT evidence of a pulmonary embolism. 2. Stable chronic small left pleural effusion and chronic interstitial   pulmonary fibrosis. 3. Interval development of multiple new nonspecific consolidative and   somewhat nodular/mass-like opacities throughout both lungs, right greater   than left, most likely reflecting a multifocal infectious or inflammatory   process, to include the possibility of atypical multifocal pneumonia. Metastatic foci are considered less likely, though not excluded. 4. Stable mediastinal and bilateral hilar lymphadenopathy, which may be   benign and reactive in etiology or represent metastatic disease. 5. No acute process within the abdomen or pelvis. 6. Stable nonspecific calcified mesenteric masses unchanged from 02/13/2020,   though increased in size from 01/25/2018.  Differential considerations   include granulomatous disease, carcinoid tumor, treated metastases, and   retractile mesenteritis. 7. Diffuse hepatic steatosis without evidence of biliary obstruction. 8. Chronic left renal cortical scarring, with stable bilateral renal cysts. 9. Nonobstructing right nephrolithiasis. 10. Patient status post median sternotomy, with interval development of   fracturing of the patient's lower mediastinal wires and new overlapping of   the median sternotomy limbs along the lower chest.  However, there is no   evidence of a sternal fluid collection or abscess.  Clinical correlation is   advised.       RECOMMENDATIONS:   Suggest appropriate clinical treatment, and short-term chest CT follow-up in   8-12 weeks to ensure resolution of the multifocal consolidative and   nodular/mass-like opacities throughout both lungs, to ensure their   resolution, as metastatic disease cannot be excluded. Impression/Plan: 68 yo M with no clear reported  history admitted for respiratory distress. We have been consulted for AUR. -Patient not responsive to questioning during exam  -He has required [facility] for retention. Apparently there are issues placing indwelling catheter but able to tolerate ISC with return of urine  -Bladder scan patient every 6-8 hours. If PVR>450cc, ok to [facility]  -We will [facility] for now.  If he continues to require ISC, may need to attempt placing indwelling griffiths catheter  -Continue flomax  -Will follow     ZUNILDA Boyer

## 2021-04-23 NOTE — PROGRESS NOTES
Exam  Constitutional:       General: He is not in acute distress. Appearance: Normal appearance. He is ill-appearing. HENT:      Head: Normocephalic and atraumatic. Cardiovascular:      Rate and Rhythm: Normal rate and regular rhythm. Pulses: Normal pulses. Pulmonary:      Effort: Pulmonary effort is normal.      Breath sounds: No rales. Abdominal:      General: Abdomen is flat. Palpations: Abdomen is soft. Musculoskeletal:         General: No swelling or tenderness. Skin:     General: Skin is warm and dry. Capillary Refill: Capillary refill takes less than 2 seconds. Neurological:      General: No focal deficit present. Mental Status: He is alert and oriented to person, place, and time. Psychiatric:         Mood and Affect: Mood normal.         Behavior: Behavior normal.               Labs      Recent Labs     04/20/21  1326 04/22/21  0107 04/22/21  1635   WBC 18.0* 8.1 10.2   HGB 13.9 10.5* 10.8*   HCT 43.2 33.9* 32.9*    133* 147   .5* 108.9* 101.6*        Recent Labs     04/21/21  0447 04/22/21  0825 04/23/21  0626    140 140   K 4.7 3.9 3.1*    106 108   CO2 23 21 23   PHOS 4.4 2.9 2.2*   BUN 30* 28* 22*   CREATININE 1.6* 1.5* 1.2       Recent Labs     04/20/21  1326 04/21/21  0447   * 204*   * 145*   BILIDIR 1.0* 0.5*   BILITOT 2.1* 0.9   ALKPHOS 399* 251*       No results for input(s): MG in the last 72 hours. Radiology  XR CHEST PORTABLE   Final Result      1. Decreased small left pleural effusion. 2. Chronic interstitial lung disease/pulmonary fibrosis. Assessment and Plan: Active Problems:    Acute on chronic respiratory failure (HCC)    Urinary tract infection without hematuria    Elevated troponin  Resolved Problems:    * No resolved hospital problems. *     Acute on chronic respiratory failure  Improved  Baseline oxygen 2 to 3 L  ?   Aspiration episode and bronchospasm  Off steroids  Continue antibiotics change to po           Agitation  Could be related to fluoroquinolones,steroids  Antibiotics changed. Psychiatry consulted. QTC is prolonged, DC risperidone  Patient has no suicidal or homicidal ideation  Discussed with the registered nurse, patient is very calm and cooperative      Acute kidney injury  Baseline is 0.7  Creatinine 1.2  Keep neutral  Encourage p.o. intake    Abnormal LFT  Improving  Thought secondary to antibiotics  Antibiotic changed. Urinary tract infection  With a leukocytosis  Pyuria  Follow-up on the urine culture  Continue with augmentin   Continue with fluconazole. Acute urinary retention  Rivera placement this morning  Flomax started  Urology following    Elevated troponin, elevated BNP  Cardiology consulted  Now has A. fib, started on apixaban  Discussed with the wife regarding the bleeding side effects, she is agreeable to starting on anticoagulation to prevent stroke    Hyperglycemia  Secondary to steroids, off steroids now  Start low-dose Lantus  Hypoglycemia protocol. Dispo: will need SNF. Discussed with case management.      Arturo Slade MD  4/23/2021

## 2021-04-23 NOTE — CARE COORDINATION
CM spoke with patient, son and daughter at bedside today. Patient is agreeable to a SNF stay. Daughter states that he seems better today, but still some confusion. CM spoke with wife, Jayesh Sarmiento, over the phone. Jayesh Sarmiento is interested in referral sent for SNF, first choice being [facility]. CM faxed referrals, will follow up with facilities. Patient will need pre-cert when accepted.       Sarah Malone, RN, BSN,   4th Floor Progressive Care Unit  [phone]

## 2021-04-23 NOTE — PROGRESS NOTES
Urology Attending Progress Note      Subjective: More alert today. ISC for 1L urine yesterday. No pain reported. Vitals:  /81   Pulse 70   Temp 97.7 °F (36.5 °C) (Axillary)   Resp 18   Ht 5' 8\" (1.727 m)   Wt 144 lb (65.3 kg)   SpO2 94%   BMI 21.90 kg/m²   Temp  Av.4 °F (36.3 °C)  Min: 96.6 °F (35.9 °C)  Max: 98.2 °F (36.8 °C)    Intake/Output Summary (Last 24 hours) at 2021 0939  Last data filed at 2021 8024  Gross per 24 hour   Intake 1864.69 ml   Output 1550 ml   Net 314.69 ml       Exam: ISC for clear urine. Able to void small amounts. Labs:  WBC:    Lab Results   Component Value Date    WBC 10.2 2021     Hemoglobin/Hematocrit:    Lab Results   Component Value Date    HGB 10.8 2021    HCT 32.9 2021     BMP:    Lab Results   Component Value Date     2021    K 3.1 2021    K 4.7 2021     2021    CO2 23 2021    BUN 22 2021    LABALBU 2.4 2021    CREATININE 1.2 2021    CALCIUM 7.8 2021    GFRAA >60 2021    LABGLOM 59 2021     PT/INR:    Lab Results   Component Value Date    PROTIME 13.7 2021    INR 1.18 2021     PTT:    Lab Results   Component Value Date    APTT 37.0 2021   [APTT    Urinalysis: Positive    Urine Culture: Yeast    Antibiotic Therapy: Augmentin    Impression/Plan: 68 yo M with no clear reported  history admitted for respiratory distress. We have been consulted for AUR. -Continues to require ISC. Spoke with patient and nurse today about chronic indwelling catheter. Patient agreed to have one placed by nursing staff today.  -Continue flomax  -Please call with any questions. If griffiths is placed, ok for patient to be discharged with griffiths. Voiding trial can be attempted either by outside facility or our office depending on disposition.     ZUNILDA Gant

## 2021-04-24 NOTE — PROGRESS NOTES
Pt had minimal urine output this morning, only 100cc out through urinal in morning. Bladder scan completed and showed >999cc urine. Was told overnight bladder scan showed <100cc post void. Pt initially refused griffiths cath, but after discussion with this RN pt agreed to griffiths placement. Griffiths cath placement completed and in place per standing order.

## 2021-04-24 NOTE — PROGRESS NOTES
Hospitalist Progress Note      PCP: Joseph Salcedo MD    Date of Admission: 4/20/2021    Chief Complaint:     Chief Complaint   Patient presents with    Shortness of Breath    Altered Mental Status       Subjective:  Patient seen and examined at the bedside. No complaints at this time.   Has declined griffiths placement, continues on flomax  Continue intermittent straight cath as needed  Reports breathing is at baseline, on 2-3L  Await Cr this morning  He will be medically ready once his Cr is back to baseline, likely tomorrow    PFHS: reviewed as documented 4/20/2021, no changes    Medications:  Reviewed    Infusion Medications    dextrose      sodium chloride       Scheduled Medications    amoxicillin-clavulanate  1 tablet Oral 2 times per day    apixaban  5 mg Oral BID    tamsulosin  0.4 mg Oral Daily    insulin glargine  5 Units Subcutaneous Nightly    aspirin  325 mg Oral Daily    levothyroxine  88 mcg Oral Daily    sodium chloride flush  5-40 mL Intravenous 2 times per day    sodium chloride flush  5-40 mL Intravenous 2 times per day    fluconazole  100 mg Oral Daily    insulin lispro  0-12 Units Subcutaneous TID WC    insulin lispro  0-6 Units Subcutaneous Nightly    escitalopram  10 mg Oral Daily    [Held by provider] risperiDONE  0.25 mg Oral Daily    glycopyrrolate-formoterol  2 puff Inhalation BID    albuterol-ipratropium  1 puff Inhalation TID    fluticasone  1 puff Inhalation BID     PRN Meds: glucose, dextrose, glucagon (rDNA), dextrose, lidocaine, sodium chloride flush, sodium chloride, sodium chloride flush, promethazine **OR** ondansetron, polyethylene glycol, acetaminophen **OR** acetaminophen, traZODone      Intake/Output Summary (Last 24 hours) at 4/24/2021 0850  Last data filed at 4/24/2021 0530  Gross per 24 hour   Intake 100 ml   Output 500 ml   Net -400 ml       Physical Exam    /71   Pulse 90   Temp 96.7 °F (35.9 °C) (Oral)   Resp 16   Ht 5' 8\" (1.727 m) Wt 144 lb (65.3 kg)   SpO2 97%   BMI 21.90 kg/m²     General appearance:  No acute distress, appears stated age  Eyes: Pupils equal, round, reactive to light, conjunctiva/corneas clear  Ears/Nose/Mouth/Throat: No external lesions or scars, hearing intact to voice  Neck: Trachea midline, no masses noted, no thyromegaly  Respiratory:  Non-labored breathing, clear to auscultation bilaterally  Cardiovascular: Regular rate and rhythm, no murmurs, gallops, or rubs  Abdomen: soft, non-tender, non-distended  Musculoskeletal: Warm, well perfused, no cyanosis or edema  Skin: normal color, no wounds noted  Psychiatric: A&Ox4, good insight and judgment    Labs:   Recent Labs     04/22/21  0107 04/22/21  1635   WBC 8.1 10.2   HGB 10.5* 10.8*   HCT 33.9* 32.9*   * 147     Recent Labs     04/22/21  0825 04/23/21  0626    140   K 3.9 3.1*    108   CO2 21 23   BUN 28* 22*   CREATININE 1.5* 1.2   CALCIUM 7.7* 7.8*   PHOS 2.9 2.2*     No results for input(s): AST, ALT, BILIDIR, BILITOT, ALKPHOS in the last 72 hours. No results for input(s): INR in the last 72 hours. No results for input(s): Rejeana Kemps in the last 72 hours. Urinalysis:      Lab Results   Component Value Date    NITRU POSITIVE 04/20/2021    WBCUA 10-20 04/20/2021    BACTERIA 1+ 04/20/2021    RBCUA 3-4 04/20/2021    BLOODU LARGE 04/20/2021    SPECGRAV 1.010 04/20/2021    GLUCOSEU Negative 04/20/2021       Radiology:  XR CHEST PORTABLE   Final Result      1. Decreased small left pleural effusion. 2. Chronic interstitial lung disease/pulmonary fibrosis.           Assessment/Plan:    Active Hospital Problems    Diagnosis Date Noted    Urinary tract infection without hematuria [N39.0]     Elevated troponin [R77.8]     Acute on chronic respiratory failure ([address] [J96.20] 04/20/2021       Plan:    # Acute on chronic respiratory failure  -improved  -baseline is 2-3L  -possible aspiration and bronchospasm  -off steroids  -ABx, on Augmentin    # SHAWANDA  -baseline 0.7, labs today pending  -encourage PO intake    # Abnormal LFTs  -likely due to ABx  -improving    # UTI  -urine culture w/ yeast  -diflucan x5d    # Acute urinary retention  -urology consulted  -on flomax  -Pt has declined griffiths placement  -straight cath as needed    # Elevated troponin, elevated BNP  # Atrial fibillation  -cardiology consulted  -on apixaban    # Hyperglycemia  -due to steroids, now off steroids  -continue low dose lantus, hypoglycemia protocol    DVT Prophylaxis: Eliquis  Diet: DIET CARB CONTROL;   Dietary Nutrition Supplements: Diabetic Oral Supplement  Code Status: Full Code    PT/OT Eval Status: Complete    Dispo: Inpt, dispo pending clinical improvement, likely medically ready tomorrow    Roberto Montes MD

## 2021-04-24 NOTE — PLAN OF CARE
Problem: Falls - Risk of:  Goal: Will remain free from falls  Description: Will remain free from falls  Outcome: Ongoing   Patient meets [place] fall risk guidelines. Non skid footwear with ambulation. Bed in lowest position. Call light in reach. Bed alarm activated. Reminded to call for assistance before exiting bed. Continue safety precautions. 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 Buttocks wound, See assessment. Excoriation to vinicio area. Continue to turn and reposition every two hours. Pillow support in place. Problem: Confusion - Acute:  Goal: Absence of continued neurological deterioration signs and symptoms  Description: Absence of continued neurological deterioration signs and symptoms  Outcome: Ongoing   Patient waxes and wanes. During a conversation he will make sense and then he gets off topic. Able to re orient fairly easily. Problem: Discharge Planning:  Goal: Ability to perform activities of daily living will improve  Description: Ability to perform activities of daily living will improve  Outcome: Ongoing   Patient needs SNF at [place]. Problem: Coping:  Goal: Level of anxiety will decrease  Description: Level of anxiety will decrease  Outcome: Ongoing   Patient less anxious when wife is visiting. Problem: Urinary Retention:  Goal: Urinary elimination within specified parameters  Description: Urinary elimination within specified parameters  Outcome: Ongoing   Patient able to urinate. Bladder scan revealed no retention. Problem: Nutrition  Goal: Optimal nutrition therapy  Outcome: Ongoing   Patient appetite decreased. States he is not hungry or thirsty. Offered a a snack and drink T/O shift.

## 2021-04-24 NOTE — PROGRESS NOTES
Physician Progress Note      Aldair Nowak  [facility] #:                  [id number]  :                       1945  ADMIT DATE:       2021 12:53 PM  [address] DATE:  RESPONDING  PROVIDER #:        Ophelia Ramirez MD          QUERY TEXT:    Pt admitted with COPD exacerbation. Pt noted to have UTI, increased WBC, RR   and HR. If possible, please document in the progress notes and discharge   summary if you are evaluating and /or treating any of the following: The medical record reflects the following:  Risk Factors: 68 yo w/ recent PNA, uti  Clinical Indicators: Per H&P: UTI. UA pyuria, large leukocyte Estrace   positive yeast.  WBC 18, RR 32, , procalcitonin 0.28. Treatment: IV Cefepime, urine culture, blood cultures, IV Vanc X1  Options provided:  -- Sepsis due to UTI present on admission  -- No Sepsis, UTI only  -- Other - I will add my own diagnosis  -- Disagree - Not applicable / Not valid  -- Disagree - Clinically unable to determine / Unknown  -- Refer to Clinical Documentation Reviewer    PROVIDER RESPONSE TEXT:    This patient has sepsis due to UTI which was present on admission. Query created by: Lane Elliott on 2021 7:00 AM      QUERY TEXT:    Pt admitted with Copd exacerbation, UTI and has AMS documented. If possible,   please document in progress notes and discharge summary further specificity   regarding the type of encephalopathy:    The medical record reflects the following:  Risk Factors: 68 yo w/ UTI, recent PNA, dementia  Clinical Indicators: Per ED:   He is not contributory to history due to mild   dementia and altered mental status. Per H&P: Alert oriented to self and   person. Not aware of place and situation.   Treatment: IV Cefepime, bed alarm, frequent rounding  Options provided:  -- Metabolic encephalopathy  -- Other - I will add my own diagnosis  -- Disagree - Not applicable / Not valid  -- Disagree - Clinically unable to determine / Unknown  -- Refer to Clinical Documentation Reviewer    PROVIDER RESPONSE TEXT:    This patient has metabolic encephalopathy.     Query created by: Jun Vernon on 4/21/2021 7:07 AM      Electronically signed by:  Malini Cohen MD 4/24/2021 8:48 AM

## 2021-04-24 NOTE — PROGRESS NOTES
Urology Attending Progress Note      Subjective: More alert today. Voiding better on his own. Minimal PVR last night    Vitals:  BP 93/60   Pulse 72   Temp 96.9 °F (36.1 °C) (Oral)   Resp 20   Ht 5' 8\" (1.727 m)   Wt 144 lb (65.3 kg)   SpO2 93%   BMI 21.90 kg/m²   Temp  Av.5 °F (36.4 °C)  Min: 96.7 °F (35.9 °C)  Max: 98.1 °F (36.7 °C)    Intake/Output Summary (Last 24 hours) at 2021 1221  Last data filed at 2021 1011  Gross per 24 hour   Intake 340 ml   Output 500 ml   Net -160 ml       Exam:   BP 93/60   Pulse 72   Temp 96.9 °F (36.1 °C) (Oral)   Resp 20   Ht 5' 8\" (1.727 m)   Wt 144 lb (65.3 kg)   SpO2 93%   BMI 21.90 kg/m²    Constitutional: NAD, WDWN. HEENT: NCAT. Conjunctivae normal. MMM. Cardiovascular: Regular rate. Pulmonary/Chest: Respirations are even and non-labored bilaterally. No audible wheezing. Abdominal: Soft. No distension, tenderness, guarding, or palpable mass. Back: No CVA tenderness. Neurological: A + O x 3. Labs:  WBC:    Lab Results   Component Value Date    WBC 10.2 2021     Hemoglobin/Hematocrit:    Lab Results   Component Value Date    HGB 10.8 2021    HCT 32.9 2021     BMP:    Lab Results   Component Value Date     2021    K 3.1 2021    K 4.7 2021     2021    CO2 23 2021    BUN 22 2021    LABALBU 2.4 2021    CREATININE 1.2 2021    CALCIUM 7.8 2021    GFRAA >60 2021    LABGLOM 59 2021     PT/INR:    Lab Results   Component Value Date    PROTIME 13.7 2021    INR 1.18 2021     PTT:    Lab Results   Component Value Date    APTT 37.0 2021   [APTT    Urinalysis: Positive    Urine Culture: Yeast    Antibiotic Therapy: Augmentin    Impression/Plan: 66 yo M with no clear reported  history admitted for respiratory distress. We have been consulted for AUR. Patient refused Rivera placement    1. Continue Flomax upon discharge.   2.

## 2021-04-24 NOTE — PLAN OF CARE
Problem: Skin Integrity:  Goal: Absence of new skin breakdown  Description: Absence of new skin breakdown  Outcome: Met This Shift. Pt turned q2 hrs. Venelex for skin care on bottom. Problem: Mood - Altered:  Goal: Mood stable  Description: Mood stable  Outcome: Met This Shift. No signs of agitation    Problem: Confusion - Acute:  Goal: Absence of continued neurological deterioration signs and symptoms  Description: Absence of continued neurological deterioration signs and symptoms  Outcome: Ongoing. Pt A and O x4 but showing signs of intermittent confusion. Problem: Injury - Risk of, Physical Injury:  Goal: Will remain free from falls  Description: Will remain free from falls  Outcome: Ongoing. Pt will remain free from accidental injury this shift. Pt has fall risk measures (fall risk bracelet, fall risk sign, fall risk cloth, non-slip socks, dome light on) in place. Pt bed is in low position, bed alarm on, bed wheels locked, call light within reach, bedside table within reach, chair wheels locked, chair alarm on. Problem: Cardiac:  Goal: Hemodynamic stability will improve  Description: Hemodynamic stability will improve  Outcome: HR remain aFIB RATE CONTROLLED. Cont heart monitoring in place. BP WNL. Problem: Nutritional:  Goal: Consumption of the prescribed amount of daily calories will improve  Description: Consumption of the prescribed amount of daily calories will improve  Outcome: Ongoing. Pt ate 50% breakfast, no lunch but did have icecream for a snack. Problem: Respiratory:  Goal: Ability to maintain a clear airway will improve  Description: Ability to maintain a clear airway will improve  Outcome: Met This Shift. No new signs of resp distress. Pt remains on 3L oxygen.

## 2021-04-25 NOTE — PROGRESS NOTES
Pt arrives from PCU in no acute distress and resting quietly in bed. Assessment as documented, fall precautions in place. VSS. Pt is on 4 liters of oxygen saturating at 96% and griffiths catheter draining hazy yellow urine. Pt placed on tele and rate and rhythm verified by monitor reader. POCG is 156. Will continue to round on pt for safety/needs.

## 2021-04-25 NOTE — PROGRESS NOTES
Occupational Therapy  Facility/Department: [address]  Daily Treatment Note  NAME: Celina Humphreys  : 1945  MRN: [id number]    Date of Service: 2021    Discharge Recommendations:  Celina Humphreys scored a 15/24 on the AM-PAC ADL Inpatient form. Current research shows that an AM-PAC score of 17 or less is typically not associated with a discharge to the patient's home setting. Based on the patient's AM-PAC score and their current ADL deficits, it is recommended that the patient have 3-5 sessions per week of Occupational Therapy at d/c to increase the patient's independence. Please see assessment section for further patient specific details. If patient discharges prior to next session this note will serve as a discharge summary. Please see below for the latest assessment towards goals. OT Equipment Recommendations  Equipment Needed: No  Other: defer to next level of care    Assessment   Performance deficits / Impairments: Decreased functional mobility ; Decreased ADL status; Decreased ROM; Decreased strength;Decreased cognition;Decreased endurance;Decreased posture  Assessment: Pt presents below his baseline related to gross deconditioning- pt requires assist of 2 for transfers and brief stance at walker. Pt limited by OTRIBIO and fatigue, requires frequent and prolonged rest breaks. Pt requires assist for ADLs. Recommend ongoing IP OT at d/c to maximize pt's safety and funcitonal status  Treatment Diagnosis: impaired mobility, transfers, ADL  REQUIRES OT FOLLOW UP: Yes  Activity Tolerance  Activity Tolerance: Patient limited by fatigue  Activity Tolerance: TORIBIO, O2 desaturation (See below)  Safety Devices  Safety Devices in place: Yes  Type of devices: Nurse notified; Left in chair;Chair alarm in place;Call light within reach         Restrictions  Position Activity Restriction  Other position/activity restrictions: up as tolerated, suicide precautions  Subjective   General  Chart Reviewed: Yes  Patient assessed for rehabilitation services?: Yes  Additional Pertinent Hx: 67 y.o. male with history of COPD on 3 L home oxygen, status post CABG, diabetes mellitus type 2, depression came into ER from [facility] for respiratory distress. Patient is not a good historian he reports that he is having issues with his breathing for last 3 to 4 years. He is complaining of pain surgical scar from CABG. He is alert oriented to himself. Wife Lucia Milan at bedside reported that patient had a end-stage COPD. He was recently admitted to hospital for pneumonia was discharged home with [facility]. At home patient was having homicidal thoughts it was related to serotonin syndrome and he was discharged to [facility]. On my evaluation patient is still having some suicidal ideations. Aid from [facility] at bedside reported that patient did not eat his breakfast this morning. Per report patient was noted to be hypoxic 79% on room air he was put on nonrebreather via EMS. Family / Caregiver Present: No  Referring Practitioner: Morena Barnett MD  Diagnosis: respiratory failure  Subjective  Subjective: Pt in bed, very pleasant and agreeable to therapy \"I don't like it when it's hard to breathe\"  Vital Signs  Patient Currently in Pain: Denies     Orientation  Orientation  Overall Orientation Status: Impaired  Orientation Level: Oriented to person;Disoriented to time;Oriented to place; Disoriented to situation     Objective    ADL  Grooming: Setup(comb hair seated in chair)  Additional Comments: ADLs limited by TORIBIO and fatigue following transfer to chair           Balance  Sitting Balance: Stand by assistance(at eob)  Standing Balance: Minimal assistance(stance at RW, posterior lean)  Time: 20 sec x2  Activity: transfers, stance at walker    Bed mobility  Supine to Sit: Maximum assistance(HOB raised, rail, VC to sequence, very effortful)       Transfers  Stand Step Transfers: Dependent/Total(min A of 2 bed>chair, pt unsteady)  Sit to stand: Dependent/Total(mod A of 2 from bed, recliner)  Stand to sit: Moderate assistance(recliner x2 reps)  Transfer Comments: TORIBIO and quick fatigue with transfers. O2 83% on 3L following bed>chair, RN called into room and increased O2 to 5L.  O2 92% on 5L with transfer trial. Remained on 5L EOS, RN aware                    Plan   Plan  Times per week: 2-5    AM-PAC Score        AM-PAC Inpatient Daily Activity Raw Score: 15 (04/25/21 1206)  AM-PAC Inpatient ADL T-Scale Score : 34.69 (04/25/21 1206)  ADL Inpatient CMS 0-100% Score: 56.46 (04/25/21 1206)  ADL Inpatient CMS G-Code Modifier : CK (04/25/21 1206)    Goals  Short term goals  Time Frame for Short term goals: d/c  Short term goal 1: pt will be Shawn x1 for supine to sit- not met  Short term goal 2: pt will be ModA x1 sit<>stand- not met  Short term goal 3: pt will be Shawn x1 bed to chair- not met  Short term goal 4: pt will be spvn/setup with simple grooming task in seated- progressing  Short term goal 5: pt will tolerate toileting assessment- not met       Therapy Time   Individual Concurrent Group Co-treatment   Time In 0750         Time Out 0816         Minutes 26         Timed Code Treatment Minutes: [address], OT

## 2021-04-25 NOTE — PROGRESS NOTES
Hospitalist Progress Note      PCP: Juanis Lopez MD    Date of Admission: 4/20/2021    Chief Complaint:     Chief Complaint   Patient presents with    Shortness of Breath    Altered Mental Status       Subjective:  Patient seen and examined at the bedside. No complaints at this time.   No acute events overnight  O2 requirement roughly stable  Cr stable at 1.2  Continues on Augmentin for aspiration PNA  Likely will be ready for dc 4/26, await psych opinion for inpt psych vs. SNF    PFHS: reviewed as documented 4/20/2021, no changes    Medications:  Reviewed    Infusion Medications    dextrose      sodium chloride       Scheduled Medications    Venelex   Topical BID    amoxicillin-clavulanate  1 tablet Oral 2 times per day    apixaban  5 mg Oral BID    tamsulosin  0.4 mg Oral Daily    insulin glargine  5 Units Subcutaneous Nightly    aspirin  325 mg Oral Daily    levothyroxine  88 mcg Oral Daily    sodium chloride flush  5-40 mL Intravenous 2 times per day    sodium chloride flush  5-40 mL Intravenous 2 times per day    insulin lispro  0-12 Units Subcutaneous TID WC    insulin lispro  0-6 Units Subcutaneous Nightly    escitalopram  10 mg Oral Daily    [Held by provider] risperiDONE  0.25 mg Oral Daily    glycopyrrolate-formoterol  2 puff Inhalation BID    albuterol-ipratropium  1 puff Inhalation TID    fluticasone  1 puff Inhalation BID     PRN Meds: glucose, dextrose, glucagon (rDNA), dextrose, lidocaine, sodium chloride flush, sodium chloride, sodium chloride flush, promethazine **OR** ondansetron, polyethylene glycol, acetaminophen **OR** acetaminophen, traZODone      Intake/Output Summary (Last 24 hours) at 4/25/2021 1149  Last data filed at 4/25/2021 0848  Gross per 24 hour   Intake 360 ml   Output 3275 ml   Net -2915 ml       Physical Exam    BP (!) 92/59   Pulse 80   Temp 98.1 °F (36.7 °C) (Oral)   Resp 18   Ht 5' 8\" (1.727 m)   Wt 144 lb (65.3 kg)   SpO2 96%   BMI 21.90 kg/m²     General appearance:  No acute distress, appears stated age  Eyes: Pupils equal, round, reactive to light, conjunctiva/corneas clear  Ears/Nose/Mouth/Throat: No external lesions or scars, hearing intact to voice  Neck: Trachea midline, no masses noted, no thyromegaly  Respiratory:  Non-labored breathing, clear to auscultation bilaterally  Cardiovascular: Regular rate and rhythm, no murmurs, gallops, or rubs  Abdomen: soft, non-tender, non-distended  Musculoskeletal: Warm, well perfused, no cyanosis or edema  Skin: normal color, no wounds noted  Psychiatric: A&Ox4, good insight and judgment    Labs:   Recent Labs     04/22/21  1635 04/25/21  0509   WBC 10.2 9.5   HGB 10.8* 8.5*   HCT 32.9* 25.5*    117*     Recent Labs     04/23/21  0626 04/25/21  0509    142   K 3.1* 4.2    109   CO2 23 25   BUN 22* 20   CREATININE 1.2 1.2   CALCIUM 7.8* 7.6*   PHOS 2.2*  --      No results for input(s): AST, ALT, BILIDIR, BILITOT, ALKPHOS in the last 72 hours. No results for input(s): INR in the last 72 hours. No results for input(s): [place] in the last 72 hours. Urinalysis:      Lab Results   Component Value Date    NITRU POSITIVE 04/20/2021    WBCUA 10-20 04/20/2021    BACTERIA 1+ 04/20/2021    RBCUA 3-4 04/20/2021    BLOODU LARGE 04/20/2021    SPECGRAV 1.010 04/20/2021    GLUCOSEU Negative 04/20/2021       Radiology:  XR CHEST PORTABLE   Final Result      1. Decreased small left pleural effusion. 2. Chronic interstitial lung disease/pulmonary fibrosis.           Assessment/Plan:    Active Hospital Problems    Diagnosis Date Noted    Urinary tract infection without hematuria [N39.0]     Elevated troponin [R77.8]     Acute on chronic respiratory failure ([facility] [address] [J96.20] 04/20/2021       Plan:    # Acute on chronic respiratory failure  -improved  -baseline is 2-3L  -possible aspiration and bronchospasm  -off steroids  -ABx, on Augmentin     # SHAWANDA  -baseline 0.7, today 1.2  -encourage PO intake     # Abnormal LFTs  -likely due to ABx  -improving     # UTI  -urine culture w/ yeast  -diflucan x5d     # Acute urinary retention  -urology consulted  -on flomax  -Pt has declined griffiths placement  -straight cath as needed     # Elevated troponin, elevated BNP  # Atrial fibillation  -cardiology consulted  -on apixaban     # Hyperglycemia  -due to steroids, now off steroids  -continue low dose lantus, hypoglycemia protocol    DVT Prophylaxis: Eliquis  Diet: DIET CARB CONTROL;   Dietary Nutrition Supplements: Diabetic Oral Supplement  Code Status: Full Code    PT/OT Eval Status: Ongoing    Dispo: Inpt, dispo pending clinical improvement, likely tomorrow, inpt psych vs. SNF    Amilcar Salmeron MD

## 2021-04-25 NOTE — PLAN OF CARE
Problem: Falls - Risk of:  Goal: Will remain free from falls  Description: Will remain free from falls  4/24/2021 2205 by Lashay Pino RN  Outcome: Ongoing   Patient meets Thersia May fall risk guidelines. Non skid footwear with ambulation. Bed in lowest position. Call light in reach. Bed alarm activated. Reminded to call for assistance before exiting bed. Continue safety precautions. Problem: Skin Integrity:  Goal: Will show no infection signs and symptoms  Description: Will show no infection signs and symptoms  Outcome: Ongoing   DTI to buttocks. Mid sternal incision, S/P CABG. Dressing CDI. Rivera in place. Continue to turn and reposition every 2 hours and as needed for comfort. Pillow support in place. Problem: Confusion - Acute:  Goal: Absence of continued neurological deterioration signs and symptoms  Description: Absence of continued neurological deterioration signs and symptoms  4/24/2021 2205 by Lashay Pino RN  Outcome: Ongoing   Patient confused, intermittent periods of lucidness. Seems to be more oriented during the day and when family visits. Problem: Sleep Pattern Disturbance:  Goal: Appears well-rested  Description: Appears well-rested  Outcome: Ongoing   Patient sleeps intermittently. Wakes up confused and takes off clothing and pulls at lines. [employer] camera at bedside. Problem: Cardiac:  Goal: Hemodynamic stability will improve  Description: Hemodynamic stability will improve  4/24/2021 2205 by Lashay Pino RN  Outcome: Ongoing   AFIB on tele with stable HR, 60's. BP soft this evening. All other VSS. Problem: Urinary Retention:  Goal: Urinary elimination within specified parameters  Description: Urinary elimination within specified parameters  4/24/2021 2205 by Lashay Pino RN  Outcome: Ongoing   Rivera placed today D/T retention issues. See I&O tab.

## 2021-04-25 NOTE — PROGRESS NOTES
supervision ongoing  Short term goal 2: sit to/from stand supervision ongoing  Short term goal 3: ambulate 20 ft with rolling walker supervision ongoing  Patient Goals   Patient goals : return home with wife    Plan    Plan  Times per week: 2-5  Current Treatment Recommendations: Strengthening, Transfer Training, Endurance Training, Functional Mobility Training, Balance Training, Gait Training  Safety Devices  Type of devices: Nurse notified, Call light within reach, Chair alarm in place, Left in chair, Telesitter in use(pt reclined)     Therapy Time   Individual Concurrent Group Co-treatment   Time In 0750         Time Out 0816         Minutes 26           Timed Code Treatment Minutes:  26    Total Treatment Minutes:  26    If the patient is discharged before the next treatment session, this note will serve as the discharge summary.      Yanni Rowe, PT, DPT

## 2021-04-25 NOTE — CONSULTS
Asked to f/u to help determine disposition. Since first seen, was moved to another unit for unclear reasons. Risperidone has been held. Nurse notes do not indicate any recent aggression or paranoia. Found to have urinary fungal infection; declining H/H with elevated MCV    MSE:  Tired, drowsy, never opened eyes. Speech somewhat dysarthric. Trouble completing sentences. o x hospital, not time. No obvious psychosis. No threats. Labs;  Ca 7.6, B12 and folate not low. Imp:  Probable delirium, rule out underlying dementia, recent alcohol abuse. Recommend:  1. Unless he has persistent and severe aggressive behaviors despite substantial medical improvement, there is no reason to direct towards inpatient psychiatric management. 2.  Risperidone is not typcally sedating, so if somnolent, likely not secondary to it. But also not clear that he actually still needs this. See how does as he becomes more alert.

## 2021-04-26 NOTE — PLAN OF CARE
Problem: Skin Integrity:  Goal: Will show no infection signs and symptoms  Description: Will show no infection signs and symptoms  Outcome: Completed   Pt's wounds CDI. No redness, swelling or warmth noted. Pt remains afebrile. Will continue to monitor. Problem: Skin Integrity:  Goal: Absence of new skin breakdown  Description: Absence of new skin breakdown  Outcome: Completed   No new skin breakdown noted since admission. Pt able to follow commands and turn self. Skin remains CDI. Pt is continent. No excoriation or new redness noted. Will continue to monitor.

## 2021-04-26 NOTE — PROGRESS NOTES
BP rechecked. 93/54. MAP 67. . Pt remains asymptomatic. MD aware. No new orders from MD. Will continue to monitor.

## 2021-04-26 NOTE — PROGRESS NOTES
Pt BP 89/53. MAP 65. HR 61. Pt denies any dizziness or lightheadedness. Dr. Nakia Sawyer notified. Waiting for response.

## 2021-04-26 NOTE — CARE COORDINATION
CM received call back from [address] [place] [address] @ [facility] ;[phone] and they can accept the patient ,  CM started  [facility] pre cert  With [employer] :   HENS submitted ; Document ID: [id number]     Covid tested (-)   4/20 will clarify if updated test needed;     Mar Laughter Spouse [phone]   Wife  Called and updated  And in agreement :  Will notify once pre cert  received and pt ready for transfer to SNF . [address] [address]       Phone: [phone]       Fax: [phone]        Electronically signed by Efe Peguero RN on 4/26/2021 at 4:43 PM             CM following for  D/c planning :  CM following up on  previously placed referrals :    Pending - Request Sent     [address] at [facility]      Left VM w/ [facility]      Left VM: await return call  Recv'd call back from [place] romero: referral be refaxed @ [address] [place] [address]      Left message  Await call back     [facility] VM: await return call     [facility] [address] [place] [address]     Will need [facility] pre cert  and HENS Submitted once accepted:     Covid testing likely needed along with medical transport at d/c     Electronically signed by fEe Peguero RN on 4/26/2021 at 3:45 PM       Efe Peguero  RN Case Manager  [facility]  [address] [facility]   [address] [place] [address]  [phone]  Fax [phone]

## 2021-04-26 NOTE — PROGRESS NOTES
Occupational Therapy  Facility/Department: [address]  Daily Treatment Note  NAME: Reji Alonso  : 1945  MRN: [id number]    Date of Service: 2021    Discharge Recommendations:Erick Pack scored a 15/ on the AM-PAC ADL Inpatient form. Current research shows that an AM-PAC score of 17 or less is typically not associated with a discharge to the patient's home setting. Based on the patient's AM-PAC score and their current ADL deficits, it is recommended that the patient have 3-5 sessions per week of Occupational Therapy at d/c to increase the patient's independence. Please see assessment section for further patient specific details. If patient discharges prior to next session this note will serve as a discharge summary. Please see below for the latest assessment towards goals. Assessment   Performance deficits / Impairments: Decreased functional mobility ; Decreased ADL status; Decreased ROM; Decreased strength;Decreased cognition;Decreased endurance;Decreased posture    Assessment: Pt presents w/ altered mental status and generalized weakness;  Pt's O2 dropped significantly after step transfer w/ 4 Ltrs. of O2, but rebounded with rest; RN aware. Pt will continue to benefit from skilled OT services while in acute setting to build strength and endurance, and to reinforce safety education; Continue POC. Treatment Diagnosis: impaired mobility, transfers, ADL  OT Education: OT Role;Plan of Care;Energy Conservation;Orientation;Transfer Training  Patient Education: cont to reinforce  Barriers to Learning: cognition; altered mental status  REQUIRES OT FOLLOW UP: Yes  Activity Tolerance  Activity Tolerance: Patient limited by fatigue  Activity Tolerance: TORIBIO, O2 dropped from 96% upon entry to 73% after transfer, rebounded after rest break in chair, Pt 4 Ltr.  of O2;  BP 96/58 upon entry; RN gave consent to treatment  Safety Devices  Safety Devices in place: Yes  Type of devices: Nurse notified; Left in chair;Chair alarm in place;Call light within reach         Patient Diagnosis(es): The primary encounter diagnosis was Urinary tract infection without hematuria, site unspecified. Diagnoses of Altered mental status, unspecified altered mental status type and Acute kidney injury [facility]) were also pertinent to this visit. has a past medical history of COPD (chronic obstructive pulmonary disease) ([facility] [address] Depressive disorder, and Diabetes mellitus ([facility] [address] has a past surgical history that includes Cardiac surgery and Cholecystectomy. Restrictions  Position Activity Restriction  Other position/activity restrictions: up as tolerated, suicide precautions  Subjective   General  Chart Reviewed: Yes  Patient assessed for rehabilitation services?: Yes  Additional Pertinent Hx: 67 y.o. male with history of COPD on 3 L home oxygen, status post CABG, diabetes mellitus type 2, depression came into ER from [facility] for respiratory distress. Patient is not a good historian he reports that he is having issues with his breathing for last 3 to 4 years. He is complaining of pain surgical scar from CABG. He is alert oriented to himself. Wife Neno Paul at bedside reported that patient had a end-stage COPD. He was recently admitted to hospital for pneumonia was discharged home with Levaquin. At home patient was having homicidal thoughts it was related to serotonin syndrome and he was discharged to [facility]. On my evaluation patient is still having some suicidal ideations. Aid from janel blake at bedside reported that patient did not eat his breakfast this morning. Per report patient was noted to be hypoxic 79% on room air he was put on nonrebreather via EMS.   Family / Caregiver Present: No  Referring Practitioner: Danna Perla MD  Diagnosis: respiratory failure  Subjective  Subjective: Pt in bed upon entry; pleasant and agreeable to therapy  Vital Signs  Patient Currently in Pain: Denies Orientation  Orientation  Overall Orientation Status: Impaired  Orientation Level: Oriented to person;Disoriented to time;Oriented to place; Disoriented to situation  Objective    ADL  Grooming: Supervision;Setup(grooming/ oral care in chair)  UE Dressing: Minimal assistance(to don fresh gown)        Balance  Sitting Balance: Stand by assistance  Standing Balance: Minimal assistance  Standing Balance  Time: ~ 30 seconds  Activity: stance at walker during step transfer  Functional Mobility  Functional - Mobility Device: Rolling Walker  Activity: Other(few steps from EOB to chair)  Assist Level: Minimal assistance  Functional Mobility Comments: Pt. Mod Ax2 w/ max vc sit to stand from EOB; Min A for step transfer to chair with RW  Bed mobility  Rolling to Right: Minimal assistance(slow, effortful to move legs off bed)  Supine to Sit: Moderate assistance(HOB raised; Pt used therapist's hand to pull up; very effortful)  Transfers  Stand Step Transfers: Minimal assistance(Min A EOB to chair)  Sit to stand: Moderate assistance;2 Person assistance(Mod Ax2 w/max VC for hand placement (required two tries))  Stand to sit: Minimal assistance(to recliner)  Transfer Comments: Pt needed max VCs for hand placement and sequencing; O2 at 96% at beginning of therapy, fell to 73% after transfer; O2 rebounded to WNL with rest break in chair; RN aware                       Cognition  Overall Cognitive Status: Exceptions(Pt very pleasant and talkative, but much of content is confabulation; Pt has grandiose ideations)  Following Commands:  Follows one step commands with increased time  Attention Span: Attends with cues to redirect  Safety Judgement: Decreased awareness of need for assistance;Decreased awareness of need for safety  Insights: Decreased awareness of deficits  Initiation: Requires cues for some  Sequencing: Requires cues for some                                         Plan   Plan  Times per week: 2-5  Times per day: Daily  G-Code     OutComes Score                                                  AM-PAC Score        AM-PAC Inpatient Daily Activity Raw Score: 15 (04/26/21 1611)  AM-PAC Inpatient ADL T-Scale Score : 34.69 (04/26/21 1611)  ADL Inpatient CMS 0-100% Score: 56.46 (04/26/21 1611)  ADL Inpatient CMS G-Code Modifier : CK (04/26/21 1611)    Goals  Short term goals  Time Frame for Short term goals: d/c  Short term goal 1: pt will be Shawn x1 for supine to sit- not met  Short term goal 2: pt will be ModA x1 sit<>stand- not met  Short term goal 3: pt will be Shawn x1 bed to chair- not met  Short term goal 4: pt will be spvn/setup with simple grooming task in seated-; Met 4/26  Short term goal 5: pt will tolerate toileting assessment- not met       Therapy Time   Individual Concurrent Group Co-treatment   Time In 1459         Time Out 1553         Minutes [facility], S/LINDSAY   Timed Code Tx Min:54  Total Tx Min: 47    Therapist was present, directed the patient's care, made skilled judgment, and was responsible for assessment and treatment of the patient. If patient is discharged prior to next treatment, this not will serve as the discharge summary.   (Francisco Banks, OTR/L, [id number])

## 2021-04-26 NOTE — PLAN OF CARE
Problem: Injury - Risk of, Physical Injury:  Goal: Will remain free from falls  Description: Will remain free from falls  4/26/2021 0928 by Pat Aviles RN  Outcome: Completed    Outcome: Ongoing  Note: Patient at risk for falls. Patient resting quietly in bed. Side rails up x 2. Bed locked in lowest position. Bed alarm on. Bedside table and call light within reach. Patient instructed to call for assistance. Patient verbalized understanding. Will continue to monitor. Problem: Respiratory:  Goal: Ability to maintain adequate ventilation will improve  Description: Ability to maintain adequate ventilation will improve  Outcome: Completed  Pt on 4L O2. Baseline is 3L. SpO2 remains >90%. No complaints of SOB this shift. Will continue to monitor. Problem: Coping:  Goal: Ability to cope will improve  Description: Ability to cope will improve  Outcome: Completed   Pt alert and oriented x4, pt behavior appropriate. No homicidal ideations noted. Pt pleasant and cooperative.

## 2021-04-26 NOTE — PLAN OF CARE
Problem: Falls - Risk of:  Goal: Will remain free from falls  Description: Will remain free from falls  Outcome: Ongoing  Note: Patient at risk for falls. Patient resting quietly in bed. Side rails up x 2. Bed locked in lowest position. Bed alarm on. Bedside table and call light within reach. Patient instructed to call for assistance. Patient verbalized understanding. Will continue to monitor.          Problem: Falls - Risk of:  Goal: Absence of physical injury  Description: Absence of physical injury  Outcome: Ongoing     Problem: Skin Integrity:  Goal: Will show no infection signs and symptoms  Description: Will show no infection signs and symptoms  Outcome: Ongoing     Problem: Skin Integrity:  Goal: Absence of new skin breakdown  Description: Absence of new skin breakdown  Outcome: Ongoing     Problem: Confusion - Acute:  Goal: Absence of continued neurological deterioration signs and symptoms  Description: Absence of continued neurological deterioration signs and symptoms  Outcome: Ongoing     Problem: Confusion - Acute:  Goal: Mental status will be restored to baseline  Description: Mental status will be restored to baseline  Outcome: Ongoing    Problem: Injury - Risk of, Physical Injury:  Goal: Absence of physical injury  Description: Absence of physical injury  Outcome: Ongoing     Problem: Mood - Altered:  Goal: Mood stable  Description: Mood stable  Outcome: Ongoing     Problem: Psychomotor Activity - Altered:  Goal: Absence of psychomotor disturbance signs and symptoms  Description: Absence of psychomotor disturbance signs and symptoms  Outcome: Ongoing

## 2021-04-26 NOTE — PROGRESS NOTES
Hospital Medicine Care  Progress Note      Chief complain; Shortness of Breath and Altered Mental Status              Subjective:     Sitting up on the bed  Denies any complaints. Denies any chest pain, shortness of breath  Denies any abdominal pain. Review of Systems:     Review of Systems as mentioned above, other ros is negative. Objective:       Medications        Scheduled Meds:   Venelex   Topical BID    apixaban  5 mg Oral BID    tamsulosin  0.4 mg Oral Daily    insulin glargine  5 Units Subcutaneous Nightly    aspirin  325 mg Oral Daily    levothyroxine  88 mcg Oral Daily    sodium chloride flush  5-40 mL Intravenous 2 times per day    sodium chloride flush  5-40 mL Intravenous 2 times per day    insulin lispro  0-12 Units Subcutaneous TID WC    insulin lispro  0-6 Units Subcutaneous Nightly    escitalopram  10 mg Oral Daily    [Held by provider] risperiDONE  0.25 mg Oral Daily    glycopyrrolate-formoterol  2 puff Inhalation BID    albuterol-ipratropium  1 puff Inhalation TID    fluticasone  1 puff Inhalation BID     Continuous Infusions:   dextrose      sodium chloride       PRN Meds:.glucose, dextrose, glucagon (rDNA), dextrose, lidocaine, sodium chloride flush, sodium chloride, sodium chloride flush, promethazine **OR** ondansetron, polyethylene glycol, acetaminophen **OR** acetaminophen, traZODone      Allergies  Allergies   Allergen Reactions    Dilaudid [Hydromorphone Hcl]     Morphine          Vitals:    04/26/21 0824 04/26/21 0837 04/26/21 1025 04/26/21 1130   BP: (!) 96/54  (!) 91/53 98/60   Pulse: 101  76 84   Resp:    18   Temp:    97.8 °F (36.6 °C)   TempSrc:    Oral   SpO2:  94%  98%   Weight:       Height:             Physical exam:         Physical Exam  Constitutional:       General: He is not in acute distress. Appearance: Normal appearance. He is ill-appearing. HENT:      Head: Normocephalic and atraumatic.    Cardiovascular:      Rate and Rhythm: Normal rate and regular rhythm. Pulses: Normal pulses. Pulmonary:      Effort: Pulmonary effort is normal.      Breath sounds: No rales. Abdominal:      General: Abdomen is flat. Palpations: Abdomen is soft. Musculoskeletal:         General: No swelling or tenderness. Skin:     General: Skin is warm and dry. Capillary Refill: Capillary refill takes less than 2 seconds. Neurological:      General: No focal deficit present. Mental Status: He is alert and oriented to person, place, and time. Psychiatric:         Mood and Affect: Mood normal.         Behavior: Behavior normal.               Labs      Recent Labs     04/25/21  0509 04/26/21  0649   WBC 9.5 7.7   HGB 8.5* 8.2*   HCT 25.5* 25.0*   * 116*   .4* 101.5*        Recent Labs     04/25/21  0509 04/26/21  0649    136   K 4.2 3.3*    104   CO2 25 24   BUN 20 15   CREATININE 1.2 0.8       No results for input(s): AST, ALT, ALB, BILIDIR, BILITOT, ALKPHOS in the last 72 hours. Recent Labs     04/26/21  0649   MG 1.20*       Radiology  XR CHEST PORTABLE   Final Result      1. Decreased small left pleural effusion. 2. Chronic interstitial lung disease/pulmonary fibrosis. Assessment and Plan: Active Problems:    Acute on chronic respiratory failure (HCC)    Urinary tract infection without hematuria    Elevated troponin  Resolved Problems:    * No resolved hospital problems. *     Acute on chronic respiratory failure  Improved  Baseline oxygen 3 L  ? Aspiration episode and bronchospasm  Off steroids  On p.o. antibiotics, towards the end of the course          Agitation  Could be related to fluoroquinolones,steroids  Antibiotics changed. Psychiatry consulted. QTC is prolonged, DC risperidone  Patient has no suicidal or homicidal ideation  Discussed with the registered nurse, patient is very calm and cooperative      Acute kidney injury  Baseline is 0.7  Creatinine 0.8. Close to baseline.     Abnormal LFT  Improving  Thought secondary to antibiotics  Antibiotic changed.     Urinary tract infection  With a leukocytosis  Pyuria  Urine culture positive for yeast  Completed fluconazole  Towards the end course of antibiotic therapy      Acute urinary retention  Patient has been refusing Rivera placement  Flomax started  Outpatient follow-up with urology      Elevated troponin, elevated BNP  Cardiology consulted  Now has CAMILA wheeler, started on apixaban  Discussed with the wife regarding the bleeding side effects, she is agreeable to starting on anticoagulation to prevent stroke    Hyperglycemia  Secondary to steroids, off steroids now  Blood sugar in low 90s  DC Lantus  Continue with sliding scale insulin    Dispo: Pending placement    Tanner Paredes MD  4/26/2021

## 2021-04-26 NOTE — DISCHARGE INSTR - COC
Continuity of Care Form    Patient Name: Ti Walls   :  1945  MRN:  [id number]    Admit date:  2021  Discharge date:  ***    Code Status Order: Full Code   Advance Directives:   Advance Care Flowsheet Documentation       Date/Time Healthcare Directive Type of Healthcare Directive Copy in [address] Agent's Name Healthcare Agent's Phone Number    21 1436  No, patient does not have an advance directive for healthcare treatment  Durable power of  for health care  --  --  --  --    21 1510  No, patient does not have an advance directive for healthcare treatment  --  --  --  Mallory Henry  [phone]            Admitting Physician:  Shirley Ibanez MD  PCP: Daphne Love MD    Discharging Nurse: [facility] Unit/Room#: 2306/2504-93  Discharging Unit Phone Number: ***    Emergency Contact:   Extended Emergency Contact Information  Primary Emergency Contact: Marianne [address] Phone: [phone]  Work Phone: [phone]  Mobile Phone: [phone]  Relation: Spouse  Secondary Emergency Contact: Zawada,Chris   [address] Phone: [phone]  Relation: Child    Past Surgical History:  Past Surgical History:   Procedure Laterality Date    CARDIAC SURGERY      CHOLECYSTECTOMY         Immunization History: There is no immunization history on file for this patient.     Active Problems:  Patient Active Problem List   Diagnosis Code    Acute on chronic respiratory failure with hypoxemia ([facility]) J96.21    Weight loss R63.4    Type 2 diabetes mellitus ([facility] [address] E11.9    Diabetic neuropathy associated with type 2 diabetes mellitus (HCC) E11.40    COPD (chronic obstructive pulmonary disease) ([facility]) J44.9    Hepatic steatosis K76.0    Hypercholesterolemia E78.00    Hypokalemia E87.6    Hypomagnesemia E83.42    Hypoalbuminemia E88.09    Community acquired pneumonia J18.9    Acute on chronic respiratory failure (HCC) J96.20    Urinary tract infection without hematuria N39.0    Elevated troponin R77.8       Isolation/Infection:   Isolation            No Isolation          Patient Infection Status       Infection Onset Added Last Indicated Last Indicated By Review Planned Expiration Resolved Resolved By    None active    Resolved    COVID-19 Rule Out 04/20/21 04/20/21 04/20/21 COVID-19 (Ordered)   04/21/21 Rule-Out Test Resulted    COVID-19 Rule Out 04/14/21 04/14/21 04/14/21 COVID-19 (Ordered)   04/14/21 Rule-Out Test Resulted            Nurse Assessment:  Last Vital Signs: BP (!) 89/53   Pulse 61   Temp 97.6 °F (36.4 °C) (Oral)   Resp 18   Ht 5' 8\" (1.727 m)   Wt 144 lb (65.3 kg)   SpO2 96%   BMI 21.90 kg/m²     Last documented pain score (0-10 scale): Pain Level: 2  Last Weight:   Wt Readings from Last 1 Encounters:   04/20/21 144 lb (65.3 kg)     Mental Status:  {IP PT MENTAL STATUS:20030:::0}    IV Access:  { BECKY IV ACCESS:673726031:::0}    Nursing Mobility/ADLs:  Walking   {CHP DME ADLs:813306637:::0}  Transfer  {CHP DME ADLs:943526018:::0}  Bathing  {CHP DME ADLs:108221633:::0}  Dressing  {CHP DME ADLs:011991384:::0}  Toileting  {CHP DME ADLs:507043371:::0}  Feeding  {CHP DME ADLs:726655489:::0}  Med Admin  {CHP DME ADLs:895624430:::0}  Med Delivery   { BECKY MED Delivery:233320201:::0}    Wound Care Documentation and Therapy:  Wound 04/24/21 Sacrum Inner Approx. 1 cm x 1 cm, pink center. edges intact. (Active)   Wound Etiology Pressure Stage  2 04/26/21 0415   Dressing Status Clean;Dry; Intact 04/26/21 0415   Wound Cleansed Irrigated with saline 04/24/21 1900   Dressing/Treatment Foam 04/26/21 0415   Wound Assessment Pink/red 04/26/21 0415   Drainage Amount None 04/26/21 0415   Odor None 04/26/21 0415   Number of days: 1        Elimination:  Continence:   · Bowel: {YES / DW:52292}  · Bladder: {YES / UT:51908}  Urinary Catheter: {Urinary Catheter:279086516:::0} Colostomy/Ileostomy/Ileal Conduit: {YES / MD:94436}       Date of Last BM: ***    Intake/Output Summary (Last 24 hours) at 2021 0816  Last data filed at 2021 0414  Gross per 24 hour   Intake 480 ml   Output 1850 ml   Net -1370 ml     I/O last 3 completed shifts:   In: 480 [P.O.:480]  Out: 1850 [Urine:1850]    Safety Concerns:     [facility] Safety Concerns:406209333:::0}    Impairments/Disabilities:      [facility] Impairments/Disabilities:747266290:::0}    Nutrition Therapy:  Current Nutrition Therapy:   [address] Diet List:386076244:::0}    Routes of Feeding: {CHP DME Other Feedings:501887368:::0}  Liquids: {Slp liquid thickness:81079}  Daily Fluid Restriction: {CHP DME Yes amt example:970047847:::0}  Last Modified Barium Swallow with Video (Video Swallowing Test): {Done Not Done JFDJ:212678267:::7}    Treatments at the Time of Hospital Discharge:   Respiratory Treatments: ***  Oxygen Therapy:  {Therapy; copd oxygen:54831:::0}  Ventilator:    {[facility] Vent List:225464533:::0}    Rehab Therapies: {THERAPEUTIC INTERVENTION:7011488923}  Weight Bearing Status/Restrictions: [employer]  Weight Bearin:::0}  Other Medical Equipment (for information only, NOT a DME order):  {EQUIPMENT:360405120}  Other Treatments: ***    Patient's personal belongings (please select all that are sent with patient):  {CHP DME Belongings:374311778:::0}    RN SIGNATURE:  {Esignature:578668415:::0}    CASE MANAGEMENT/SOCIAL WORK SECTION    Inpatient Status Date: 21    Readmission Risk Assessment Score:  Readmission Risk              Risk of Unplanned Readmission:        25           Discharging to Facility/ Agency   · [place] [address]        Phone: [phone]        Fax: [phone]        ·       / signature: Electronically signed by Eliza Lynch RN on 21 at 12:24 PM EDT    PHYSICIAN SECTION    Prognosis: Fair    Condition at Discharge: Stable    Rehab Potential (if transferring to Rehab):  Fair    Recommended Labs or Other Treatments After Discharge:     Renal panel in 3 days  PT OT  Voiding trial with urology     Physician Certification: I certify the above information and transfer of Franklintie Book  is necessary for the continuing treatment of the diagnosis listed and that he requires [facility]   Update Admission H&P: No change in H&P    PHYSICIAN SIGNATURE:  Electronically signed by Francesca Caro MD on 4/26/21 at 8:17 AM EDT

## 2021-04-27 NOTE — CARE COORDINATION
Precert approval from [facility]. Called facility admissions and LM for discharge today for their facility. Spoke to [address], Luna Desai, and expect discharge today unless there may be no beds available at [facility]. Set tentative transport to [facility] for today at 1730. Rapid COVID ordered. CM will continue to follow patient until discharge.   Electronically signed by Christine Son RN on 4/27/2021 at 12:32 PM

## 2021-04-27 NOTE — PLAN OF CARE
Problem: Coping:  Goal: Ability to cope will improve  Description: Ability to cope will improve  Outcome: Ongoing     Problem: Nutritional:  Goal: Consumption of the prescribed amount of daily calories will improve  Description: Consumption of the prescribed amount of daily calories will improve  Outcome: Ongoing     Problem: Skin Integrity:  Goal: Will show no infection signs and symptoms  Description: Will show no infection signs and symptoms  Outcome: Ongoing     Problem: Skin Integrity:  Goal: Absence of new skin breakdown  Description: Absence of new skin breakdown  Outcome: Ongoing  Note: Patient has DTI to right buttocks. Patient has meplex to bottom that is clean dry and intact. Patient also has meplex to left forearm that is clean dry and intact. Patient has scattered bruising to body.  Will continue to monitor     Problem: Cardiac:  Goal: Hemodynamic stability will improve  Description: Hemodynamic stability will improve  Outcome: Completed     Problem: Respiratory:  Goal: Ability to maintain adequate ventilation will improve  Description: Ability to maintain adequate ventilation will improve  Outcome: Completed

## 2021-04-27 NOTE — CARE COORDINATION
To [facility] at [phone] today by [address] ambulance stretcher. Nurse report: [phone]  Fax: [phone] discharge packet.   Electronically signed by Lety Muhammad RN on 4/27/2021 at 1:32 PM

## 2021-04-27 NOTE — PROGRESS NOTES
Patient alert and oriented x 3, uncooperative and hard to redirect at times throughout shift. VSS, A-fib on telemetry. Patient ambulates x 2 assist with walker and gait belt. Patient denied pain throughout shift. Patient currently in bed with bed alarm on, wheels locked and in lowest position. Awaiting [facility] to arrive to transport pt to [facility].

## 2021-04-27 NOTE — PROGRESS NOTES
Report called to [employer] at [facility]. Transport set up for 1730 this evening.

## 2021-04-27 NOTE — PROGRESS NOTES
Patient alert and oriented times 3-4. Patient vss this shift. Patient on Telemetry in afib. Patient iv is in right wrist. Patient is a times two assist to bed. Patient has a griffiths in place. patient has no complaints. Patient adbominal sounds active. Patient skin has DTI to right buttocks. Patient has meplex to bottom that is clean dry and intact. Patient also has meplex to left forearm that is clean dry and intact. Patient has scattered bruising to body. . Patient breath sounds clear but diminished. Patient bed locked in lowest position with bed alarm on. Call light bedside table and belongings in reach. Patient encouraged to call with any and all needs. Patient verbalizes understanding and call appropriately. Will continue to monitor.

## 2021-04-27 NOTE — PROGRESS NOTES
NUTRITION ASSESSMENT  Admission Date: 4/20/2021     Type and Reason for Visit: Reassess    NUTRITION RECOMMENDATIONS:   1. Continue with Carb Control Diet per MD  2. Continue Glucerna BID. 3. Please obtain an updated weight when possible. NUTRITION ASSESSMENT:  Pt remains nutritionally compromised AEB decreased po intakes. Pt reports that he usually weighs between 150-170 lbs. Recommend obtainting an actual wt to assess. Noted muscle/fat loss; however, logan nutritional loss vs age. Pt reports having a good appetite but eats small amounts at home and often heavily restricts diet to control weight. RD encouraged 3 meals per day or small, frequent meals to meet nutritional needs. Pt voices drinking Ensure at home. Encouraged ONS for additional nutrition support. Discussed the importance of balanced meals and sufficient intakes to maintain wt. Will continue to follow per [facility]. RD did not conduct direct, in-person nutrition evaluation in efforts to reduce exposure and use of PPE for high risk persons, PUI persons, patients who have tested positive for Covid-19 or those awaiting respiratory panel results. EMR was screened for nutrition risk factors, as defined per nutrition standards of care. MALNUTRITION ASSESSMENT  Context of Malnutrition: Acute Illness   Malnutrition Status: At risk for malnutrition (Comment)  Findings of the 6 clinical characteristics of malnutrition (Minimum of 2 out of 6 clinical characteristics is required to make the diagnosis of moderate or severe Protein Calorie Malnutrition based on AND/ASPEN Guidelines):  Energy Intake: Less than/equal to 50% of estimated energy requirements    Energy Intake Time: Greater than or equal to 7 days    Weight Loss %: Unable to assess    Weight loss Time: Unable to assess   Due to current CDC guidelines recommending 6-ft distancing for social isolation for COVID19 prevention, physical aspects of the malnutrition assessment were withheld at this time. NUTRITION DIAGNOSIS   Problem: Problem #1: Inadequate oral intake   Etiology: Decreased ability to consume sufficient energy   Signs & Symptoms: Diet history of poor intake , Intake 0-25%, Intake 26-50%, Patient report of  and Weight loss     NUTRITION INTERVENTION  Food and/or Nutrient Delivery: Continue Current Diet, Continue Oral Nutrition Supplement   Nutrition Education/Counseling: No recommendation at this time   Coordination of Nutrition Care: Continue to monitor while inpatient     NUTRITION MONITORING & EVALUATION:  Evaluation:Goals set  or Progressing towards goal   Goals:Goals: Pt to meet >50% of nutritional requirements from diet and ONS  Monitoring: Meal Intake , Pertinent Labs , Supplement Intake  or Weight      OBJECTIVE DATA: Significant to nutrition assessment  · Nutrition-Focused Physical Findings: +1 BLE edema, BM 4/23  · Wounds Stage II     Past Medical History:   Diagnosis Date    [facility])     Depressive disorder     Diabetes mellitus ([facility]         ANTHROPOMETRICS  Current Height: 5' 8\" (172.7 cm)  Current Weight: 144 lb (65.3 kg)  Appears stated  Admission weight: 144 lb (65.3 kg) Appears stated  Ideal Bodyweight 154 lb   Usual Bodyweight 150-170 lb per pt  Weight Changes Unable to confirm as all previous wt Hx is stated        BMI BMI (Calculated): 21.9    Wt Readings from Last 50 Encounters:   04/20/21 144 lb (65.3 kg)   04/14/21 144 lb (65.3 kg)   03/23/21 185 lb (83.9 kg)   10/19/20 185 lb (83.9 kg)   02/13/20 201 lb (91.2 kg)       COMPARATIVE STANDARDS  Estimated Total Kcals/Day : 25-30  Current Bodyweight (65 kg) 0243-1603 kcal/day    Estimated Total Protein (g/day) : 1.2-1.4 Current Bodyweight (65 kg) 78-91 g/day  Estimated Daily Total Fluid (ml/day): 7816-0920 mL per day     Food / Nutrition-Related History  Pre-Admission / Home Diet:  Pre-Admission/Home Diet: General   Home Supplements / Herbals:    none noted  Food Restrictions / Cultural Requests:    none noted    Current Nutrition Therapies   DIET CARB CONTROL; Dietary Nutrition Supplements: Diabetic Oral Supplement     PO Intake: 1-25% and 26-50%   PO Supplement: Diabetic    PO Supplement Intake: 26-50%- per pt   IVF: none    NUTRITION RISK LEVEL: Risk Level:  Moderate     Abbi Dubois, [address] [place] [address]  [place]:  [phone]  Office:  [phone]

## 2021-04-27 NOTE — DISCHARGE SUMMARY
Discharge Summary    Date:4/27/2021        Patient Delma Floyd     YOB: 1945     Age:75 y.o. Admit Date:4/20/2021   Admission Condition:fair   Discharged Condition:good  Discharge Date: 04/27/21    Discharge Diagnoses   Active Problems:    Acute on chronic respiratory failure (HCC)    Urinary tract infection without hematuria    Elevated troponin  Resolved Problems:    * No resolved hospital problems. [facility] Stay   Narrative of Hospital Course:     Acute on chronic respiratory failure  Improved  Baseline oxygen 3 L  ? Aspiration episode and bronchospasm  Off steroids  On p.o. antibiotics, towards the end of the course              Agitation  Could be related to fluoroquinolones,steroids  Completed po abx   Psychiatry consulted. QTC is prolonged, DC risperidone  Patient has no suicidal or homicidal ideation  Discussed with the registered nurse, patient is very calm and cooperative  Discussed with Psych, ok to dc to SNF.      Acute kidney injury  Baseline is 0.7  Creatinine 0.8. Close to baseline.     Abnormal LFT  Improving  Thought secondary to antibiotics  Antibiotic changed.     Urinary tract infection  With a leukocytosis  Pyuria  Urine culture positive for yeast  Completed fluconazole  Completed abx         Acute urinary retention  S/p griffiths  OP voiding trial  Urology was following.         Elevated troponin, elevated BNP  Cardiology consulted  Now has A. fib, started on apixaban  Discussed with the wife regarding the bleeding side effects, she is agreeable to starting on anticoagulation to prevent stroke     Hyperglycemia  SSI on dc .         Physical exam   Vitals:    04/26/21 2310 04/27/21 0322 04/27/21 0739 04/27/21 1149   BP: 112/74 93/60 (!) 92/57 108/70   Pulse: 78 76 69 85   Resp: 16 18 18 18   Temp: 96.5 °F (35.8 °C) 97 °F (36.1 °C) 97.7 °F (36.5 °C) 97.6 °F (36.4 °C)   TempSrc: Oral Oral Oral Oral   SpO2: 97% 95% 95% 95%   Weight:       Height:             Physical Exam  Constitutional:       Appearance: Normal appearance. HENT:      Head: Normocephalic and atraumatic. Cardiovascular:      Rate and Rhythm: Normal rate and regular rhythm. Pulses: Normal pulses. Heart sounds: Normal heart sounds. Pulmonary:      Effort: Pulmonary effort is normal.      Breath sounds: Normal breath sounds. Abdominal:      General: Abdomen is flat. Palpations: Abdomen is soft. Musculoskeletal: Normal range of motion. Skin:     General: Skin is warm and dry. Capillary Refill: Capillary refill takes less than 2 seconds. Neurological:      General: No focal deficit present. Mental Status: He is alert and oriented to person, place, and time. Psychiatric:         Mood and Affect: Mood normal.         Behavior: Behavior normal.           Consultants:   PHARMACY TO DOSE VANCOMYCIN  IP CONSULT TO HOSPITALIST  IP CONSULT TO PHARMACY  IP CONSULT TO PSYCHIATRY  IP CONSULT TO SPIRITUAL SERVICES  IP CONSULT TO SOCIAL WORK  IP CONSULT TO UROLOGY  IP CONSULT TO CARDIOLOGY    Time Spent on Discharge:  45 minutes were spent in patient examination, evaluation, counseling as well as medication reconciliation, prescriptions for required medications, discharge plan and follow up. Surgeries/Procedures Performed:            Significant Diagnostic Studies:   Recent Labs:  CBC:   Recent Labs     04/25/21  0509 04/26/21  0649 04/27/21  0617   WBC 9.5 7.7 8.7   HGB 8.5* 8.2* 9.0*   HCT 25.5* 25.0* 27.7*   .4* 101.5* 102.1*   * 116* 157     BMP:   Recent Labs     04/25/21  0509 04/26/21  0649 04/27/21  0617    136 138   K 4.2 3.3* 3.7    104 102   CO2 25 24 22   BUN 20 15 14   CREATININE 1.2 0.8 0.8     Mag:   Lab Results   Component Value Date    MG 1.20 04/26/2021     LIVER PROFILE: No results for input(s): AST, ALT, LIPASE, BILIDIR, BILITOT, ALKPHOS in the last 72 hours. Invalid input(s):   AMYLASE,  ALB  PT/INR: No results for input(s): PROTIME, medications    levoFLOXacin 500 MG tablet  Commonly known as: LEVAQUIN     risperiDONE 0.25 MG tablet  Commonly known as: RISPERDAL           Where to Get Your Medications      These medications were sent to [place], [address] Lui Fernandez [phone] - [phone]  [address] [place] [address] [facility] [address]    Phone: [phone]   · apixaban 5 MG Tabs tablet  · levothyroxine 88 MCG tablet  · tamsulosin 0.4 MG capsule         Electronically signed by Kyler Snell MD on 4/27/21 at 1:03 PM EDT

## 2021-04-27 NOTE — PROGRESS NOTES
Pt discharged to [facility] via [address] IV and telemetry removed. Discharge paperwork sent with pt. Pt discharged with griffiths for urinary retention per orders. All of personal belongings sent home with pt's wife.

## 2021-04-27 NOTE — CARE COORDINATION
Case Management Assessment            Discharge Note                    Date / Time of Note: 4/27/2021 1:32 PM                  Discharge Note Completed by: Mike Reyes    Patient Name: Reji Alonso   YOB: 1945  Diagnosis: Acute on chronic respiratory failure [facility]) [J96.20]   Date / Time: 4/20/2021 12:53 PM    Current PCP: Maria Del Carmen Fritz MD  Clinic patient: {Yes/No:19726}    Hospitalization in the last 30 days: {Yes/No:19726}    Advance Directives:  Code Status: Full Code  [place] DNR form completed and on chart: {RESPONSES; YES/NO/NOT WPFGIHPYV:14119}    Financial:  Payor: Rui Fox / Plan: [facility] HMO / Product Type: *No Product type* /      Pharmacy:    Chris [facility] [address] [place] [address] [phone]  [address]  [address] [place] [address]  Phone: [phone] Fax: [phone]      Assistance purchasing medications?: Potential Assistance Purchasing Medications: No  Assistance provided by Case Management: {CM Assistance provided:67461}    Does patient want to participate in local refill/ meds to beds program?: No    Meds To Beds General Rules:  1. Can ONLY be done Monday- Friday between 8:30am-5pm  2. Prescription(s) must be in pharmacy by 3pm to be filled same day  3. Copy of patient's insurance/ prescription drug card and patient face sheet must be sent along with the prescription(s)  4. Cost of Rx cannot be added to hospital bill. If financial assistance is needed, please contact unit  or ;  or  CANNOT provide pharmacy voucher for patients co-pays  5.  Patients can then  the prescription on their way out of the hospital at discharge, or pharmacy can deliver to the bedside if staff is available. (payment due at time of pick-up or delivery - cash, check, or card accepted)     Able to afford home medications/ co-pay costs: {Yes/No:19726}    ADLS:  Current PT AM-PAC Score: 10 /24  Current OT AM-PAC Score: 15 /24      DISCHARGE Disposition: { Discharge Disposition:00385}    LOC at discharge: { LOC at Discharge:85419}  BECKY Completed: {RESPONSES; YES/NO/NOT ECMHWWTAW:51826}    Notification completed in HENS/PAS?:  { HENS:71710}    IMM Completed:   { XIH:82127}    Transportation:  Transportation PLAN for discharge: {GWK:30108}   Mode of Transport: { Mode of Transport:53216}  Reason for medical transport: { Reason for Medical Transport:51311}  Name of [address]: [employer]:92205}  Time of Transport: ***    Transport form completed: {RESPONSES; YES/NO/NOT WGKADRNKJ:53128}    Home Care:  [address] ordered at discharge: {RESPONSES; YES/NO/NOT ZDQPOLKSJ:39698}  Home Care Agency: { Home Care Providers:76596}  Orders faxed: {Yes/No:19726}    Durable Medical Equipment:  DME Provider: ***  Equipment obtained during hospitalization: {EQUIPMENT:028222226}    Home Oxygen and Respiratory Equipment:  Oxygen needed at discharge?: {RESPONSES; YES/NO/NOT TRHOEJIOT:45048}  [address]: { OXYGEN Providers:78131}  Portable tank available for discharge?: {RESPONSES; YES/NO/NOT AJVJFYNEB:85317}    Dialysis:  Dialysis patient: {Yes/No:19726}    Dialysis Center:  { HD Facilities:90119}    Hospice Services:  Location: { Hospice Service Location:71729}  Agency: { Hospice Agencies:55260}    Consents signed: {RESPONSES; YES/NO/NOT KCBTNKGFQ:33655}    Referrals made at [facility] for outpatient continued care:  { Discharge OP Referrals:41573}    Additional  Notes: ***    The Plan for Transition of Care is related to the following treatment goals of Acute on chronic respiratory failure ([facility] [J96.20]    The Patient and/or patient representative Maria Luisa Perez and his family were provided with a choice of provider and agrees with the discharge plan {RESPONSES; YES/NO/NOT SAEKWHNBS:66406}    Freedom of choice list was provided with basic dialogue that supports the patient's individualized plan of care/goals and shares the quality data associated with the providers.  {RESPONSES; YES/NO/NOT ZDKNFBAOJ:37505}    Care Transitions patient: {Yes/No:19726}    Chapo Gallardo   Case Management Department  Ph: ***  Fax: ***

## 2021-05-04 NOTE — Clinical Note
Patient Class: Inpatient [101]   REQUIRED: Diagnosis: Septic shock ([facility] [0007434]   Estimated Length of Stay: Estimated stay of more than 2 midnights   Admitting Provider: Thierry Martinez [[id number]]   Telemetry Bed Required?: Yes

## 2021-05-05 PROBLEM — I48.91 ATRIAL FIBRILLATION (HCC): Status: ACTIVE | Noted: 2021-01-01

## 2021-05-05 PROBLEM — A41.9 SEPTIC SHOCK (HCC): Status: ACTIVE | Noted: 2021-01-01

## 2021-05-05 PROBLEM — R65.21 SEPTIC SHOCK (HCC): Status: ACTIVE | Noted: 2021-01-01

## 2021-05-05 PROBLEM — U07.1 PNEUMONIA DUE TO COVID-19 VIRUS: Status: ACTIVE | Noted: 2021-01-01

## 2021-05-05 PROBLEM — J12.82 PNEUMONIA DUE TO COVID-19 VIRUS: Status: ACTIVE | Noted: 2021-01-01

## 2021-05-05 NOTE — TELEPHONE ENCOUNTER
Writer contacted  Dr. Almonte Other ,ED provider to inform of 30 day readmission risk. ED provider informed writer admit or discharge has not been determined. Continue to follow-up.

## 2021-05-05 NOTE — ED NOTES
Dr. Ade Rey called CODE. Pt pronounced .       Riley James RN  21 0345       Riley James RN  21 0511

## 2021-05-05 NOTE — ED NOTES
ST on monitor. Attempted to take pt to ICU.        Riley Dave RN  05/05/21 0524       December NA Dave  05/05/21 6830

## 2021-05-05 NOTE — ED NOTES
Chikis Murguia from [address] [facility] aware of pts DX.         December NA James  05/05/21 1309

## 2021-05-05 NOTE — ED NOTES
I spoke with pts daughter Shine Smith, who lives in [facility], and made her aware of her father's passing. Daughter made me aware that pts wife is currently at [facility] and was admitted last night with Prakash.       Riley Dave RN  05/05/21 5349

## 2021-05-05 NOTE — ED NOTES
[facility]  aware of death, states he will not be a coroners case.        December NA James  05/05/21 9944

## 2021-05-05 NOTE — ED NOTES
Report given to [facility]. As I was giving report pt noted to have no pressure being able to be taken, pt also noted guppy breathing. Dr. Camilo Pollard notified. [facility] called.      Riley Dave RN  05/05/21 3228       Riley James RN  05/05/21 6874

## 2021-05-05 NOTE — ED NOTES
Pt noted with HR decreasing rapidly. Dr. Carla Kilgore aware and at bedside. CPR began.      December NA James  05/05/21 3507

## 2021-05-05 NOTE — ED NOTES
Pulse check. No HR. Continue CPR.      Riley Dave RN  05/05/21 0520       Riley Dave RN  05/05/21 7319

## 2021-05-05 NOTE — ED NOTES
Bed: B-  Expected date: 5/4/21  Expected time: 9:49 PM  Means of arrival: [facility]  Comments:  Estefany Weathers [facility]  05/04/21 4198

## 2021-05-05 NOTE — ED NOTES
Pt coded in hallways on the way to ICU, I started CPR michelle RESP at bedside.       Tereza Malcolm  05/05/21 8937

## 2021-05-06 ENCOUNTER — TELEPHONE (OUTPATIENT)
Dept: SOCIAL WORK | Age: 76
End: 2021-05-06

## 2021-05-06 LAB
EKG ATRIAL RATE: 83 BPM
EKG DIAGNOSIS: NORMAL
EKG Q-T INTERVAL: 364 MS
EKG QRS DURATION: 94 MS
EKG QTC CALCULATION (BAZETT): 430 MS
EKG R AXIS: 89 DEGREES
EKG T AXIS: 232 DEGREES
EKG VENTRICULAR RATE: 84 BPM

## 2021-05-06 NOTE — ED PROVIDER NOTES
[address] [facility]      Pt Name: Radha Barnett  MRN: [id number]  [place] 1945  Date of evaluation: 5/4/2021  Provider: Fidelina Kingsley MD    CHIEF COMPLAINT       Chief Complaint   Patient presents with    Positive For Covid-19     decreased sats at SNF, does not usually use oxygen. arrived on NRB at 99%. pt alert and oriented. c/o SOB. HISTORY OF PRESENT ILLNESS    Radha Barnett is a 67 y.o. male who presents to the emergency department with respiratory distress. Patient presents from skilled nursing facility with shortness of breath and difficulty breathing. Decreased saturation and on nonrebreather on arrival.  Patient alert and oriented on arrival but weak and lethargic. Not complaining of any pain. History otherwise limited secondary to patient's lethargy and trouble breathing. He is Covid positive per nursing home. Nursing Notes were reviewed. Including nursing noted for FM, Surgical History, Past Medical History, Social History, vitals, and allergies; agree with all.      REVIEW OF SYSTEMS       Review of Systems   Unable to perform ROS: Mental status change     PAST MEDICAL HISTORY     Past Medical History:   Diagnosis Date    COPD [facility]chronic obstructive [facility] disease) ([facility]     Depressive disorder     Diabetes mellitus ([facility]        SURGICAL HISTORY       Past Surgical History:   Procedure Laterality Date    CARDIAC SURGERY      CHOLECYSTECTOMY         CURRENT MEDICATIONS       Discharge Medication List as of 5/5/2021  6:58 AM      CONTINUE these medications which have NOT CHANGED    Details   apixaban (ELIQUIS) 5 MG TABS tablet Take 1 tablet by mouth 2 times daily, Disp-60 tablet, R-1Normal      tamsulosin (FLOMAX) 0.4 MG capsule Take 1 capsule by mouth daily, Disp-30 capsule, R-3Normal      levothyroxine (SYNTHROID) 88 MCG tablet Take 1 tablet by mouth daily, Disp-30 tablet, R-0Normal      insulin aspart (NOVOLOG FLEXPEN) 100 UNIT/ML injection pen Inject into the skin 4 times daily (with meals and nightly) -249 = 2 units  -299 = 4 units  - 349 = 6 units  - 399 = 8 units  BG >400 = 10 unitsHistorical Med      escitalopram (LEXAPRO) 10 MG tablet Take 10 mg by mouth dailyHistorical Med      traZODone (DESYREL) 50 MG tablet Take 50 mg by mouth nightly as needed for SleepHistorical Med      magnesium hydroxide (MILK OF MAGNESIA) 400 MG/5ML suspension Take by mouth daily as needed for ConstipationHistorical Med      aluminum & magnesium hydroxide-simethicone (MAG-AL PLUS) 200-200-20 MG/5ML SUSP suspension Take 30 mLs by mouth every 4 hours as needed for IndigestionHistorical Med      acetaminophen (TYLENOL) 325 MG tablet Take 650 mg by mouth every 6 hours as needed (headache) Max dose 3g in 24 hoursHistorical Med      fluticasone-umeclidin-vilant (TRELEGY ELLIPTA) 100-62.5-25 MCG/INH AEPB Inhale 1 puff into the lungs dailyHistorical Med             ALLERGIES     Dilaudid [hydromorphone hcl] and Morphine    FAMILY HISTORY      History reviewed. No pertinent family history.     SOCIAL HISTORY       Social History     Socioeconomic History    Marital status: Single     Spouse name: None    Number of children: None    Years of education: None    Highest education level: None   Occupational History    None   Social Needs    Financial resource strain: None    Food insecurity     Worry: None     Inability: None    Transportation needs     Medical: None     Non-medical: None   Tobacco Use    Smoking status: Former Smoker    Smokeless tobacco: Never Used   Substance and Sexual Activity    Alcohol use: Not Currently    Drug use: None    Sexual activity: None   Lifestyle    Physical activity     Days per week: None     Minutes per session: None    Stress: None   Relationships    Social connections     Talks on phone: None     Gets together: None     Attends [employer] service: None     Active member of Emergency Department Physician who either signs or Co-signs this chart in the absence of acardiologist.    EKG shows Accelerated Junctional rhythmLow voltage QRSIncomplete right bundle branch block    RADIOLOGY:   Non-plain film images such as CT, Ultrasoundand MRI are read by the radiologist. Plain radiographic images are visualized and preliminarily interpreted by the emergency physician with the below findings:    Impression   Mild cardiomegaly and mild central pulmonary congestion which is more   prominent.       Hazy interstitial opacities along the perihilar regions and lung bases which   could represent interstitial edema or early pneumonitis which is more   prominent with underlying chronic interstitial lung disease.       Tiny bibasilar pleural effusions which is more prominent     ED BEDSIDE ULTRASOUND:   Performed by ED Physician - none    LABS:  Labs Reviewed   CBC WITH AUTO DIFFERENTIAL - Abnormal; Notable for the following components:       Result Value    RBC 2.98 (*)     Hemoglobin 10.3 (*)     Hematocrit 31.6 (*)     .9 (*)     MCH 34.6 (*)     RDW 22.6 (*)     Platelets 742 (*)     Lymphocytes Absolute 0.7 (*)     All other components within normal limits    Narrative:     Performed at:  [facility]  [address] [place], [place] [address]   Phone [phone]   BASIC METABOLIC PANEL W/ REFLEX TO MG FOR LOW K - Abnormal; Notable for the following components:    Potassium reflex Magnesium 5.3 (*)     CO2 17 (*)     Anion Gap 19 (*)     BUN 21 (*)     Calcium 8.1 (*)     All other components within normal limits    Narrative:     Performed at:  [facility]  [address] [place], [place] [address]   Phone [phone]   TROPONIN - Abnormal; Notable for the following components:    Troponin 0.22 (*)     All other components within normal limits    Narrative:     Performed at:  [facility]  3994 HCO3, Arterial 11.5 (*)     Base Excess, Arterial -14.3 (*)     Hemoglobin, Art, Extended 9.2 (*)     O2 Sat, Arterial 91.3 (*)     Carboxyhgb, Arterial 2.2 (*)     All other components within normal limits    Narrative:     Performed at:  [address] [place] [address]   Phone [phone]   LACTIC ACID, PLASMA - Abnormal; Notable for the following components:    Lactic Acid 10.9 (*)     All other components within normal limits    Narrative:     Néstor Rondon,  Chemistry results called to and read back by Riley James RN,  05/05/2021 01:51, by [facility] \[facility] [place] [facility]\""  Performed at:  [address] [place] [address]   Phone [phone]   POCT GLUCOSE - Abnormal; Notable for the following components:    POC Glucose 65 (*)     All other components within normal limits    Narrative:     Performed at:  [address] [place] [address]   Phone [phone]   LIPASE    Narrative:     Performed at:  [facility]  [address] [place] [address]   Phone [phone]   CK    Narrative:     Performed at:  [facility]  [address] [place] [address]   Phone [phone]       All other labs were withinnormal range or not returned as of this dictation. EMERGENCY DEPARTMENT COURSE and DIFFERENTIAL DIAGNOSIS/MDM:     PMH, Surgical Hx, FH, Social Hx reviewed by myself (ETOH usage, Tobacco usage, Drug usage reviewed by myself, no pertinent Hx)- No Pertinent Hx     Old records were reviewed by me    During cardiac arrest ACLS was performed with CPR performed by myself during portion of CODE. Ventilation was performed per ACLS guidelines. ACLS medications and or shocks were given per ACLS guidelines. Please refer to Nursing documentation for details of code. 54-year-old male with Covid pneumonia. Patient with acute hypoxic respiratory distress on arrival.  Evidence of septic shock. Patient was started on fluids, IV antibiotics. Decadron given for Covid. Pressor support for shock and hypotension not responsive to fluids. Patient was intubated when he became minimally responsive not protecting airway. Central line was placed for pressor support. Arterial line was placed for blood pressure monitoring and hospitalist request.  Sadly patient coded multiple times in the emergency room. ACLS protocol was initiated. Please review nursing documentation for further details. Unsuccessful at resuscitating. Time of death subsequently called. CRITICAL CARE TIME   Total Critical Caretime was 150 minutes, excluding separately reportable procedures. There was a high probability of clinically significant/life threatening deterioration in the patient's condition which required my urgent intervention. PROCEDURES:  Intubation    Date/Time: 5/6/2021 4:43 AM  Performed by: Surya Esquivel MD  Authorized by: Surya Esquivel MD     Consent:     Consent obtained:  Emergent situation  Pre-procedure details:     Patient status:  Unresponsive    Mallampati score: I    Pretreatment meds: Etomidate. Paralytics:  Rocuronium  Procedure details:     Preoxygenation:  Bag valve mask    Intubation method:  Oral    Laryngoscope blade: Mac 4    Tube size (mm):  7.5    Tube type:  Cuffed    Number of attempts:  1    Cricoid pressure: yes      Tube visualized through cords: yes    Placement assessment:     Tube secured with:  ETT mathew    Breath sounds:  Equal    Placement verification: chest rise, condensation, CXR verification and direct visualization      CXR findings:  ETT in proper place  Post-procedure details:     Patient tolerance of procedure:   Tolerated well, no immediate complications  Central Line    Date/Time: 5/6/2021 4:44 AM  Performed by: Surya Esquivel MD  Authorized by: Jeromy Rojas MD     Consent:     Consent obtained:  Emergent situation  Pre-procedure details:     Hand hygiene: Hand hygiene performed prior to insertion      Sterile barrier technique: All elements of maximal sterile technique followed      Skin preparation:  2% chlorhexidine    Skin preparation agent: Skin preparation agent completely dried prior to procedure    Anesthesia (see MAR for exact dosages): Anesthesia method:  Local infiltration    Local anesthetic:  Lidocaine 1% w/o epi  Procedure details:     Location:  R femoral    Patient position:  Flat    Procedural supplies:  Triple lumen    Catheter size:  7 Fr    Landmarks identified: yes      Ultrasound guidance: yes      Sterile ultrasound techniques: Sterile gel and sterile probe covers were used      Number of attempts:  3    Successful placement: yes    Post-procedure details:     Post-procedure:  Dressing applied    Assessment:  Blood return through all ports and free fluid flow    Patient tolerance of procedure: Tolerated well, no immediate complications  Comments:      Estimated blood loss 10 cc  Arterial Line    Date/Time: 5/6/2021 4:44 AM  Performed by: Jermoy Rojas MD  Authorized by: Jeromy Rojas MD     Consent:     Consent obtained:  Emergent situation  Indications:     Indications: hemodynamic monitoring    Pre-procedure details:     Skin preparation:  2% Chlorhexidine    Preparation: Patient was prepped and draped in sterile fashion    Anesthesia (see MAR for exact dosages): Anesthesia method:  Local infiltration    Local anesthetic:  Lidocaine 1% w/o epi  Procedure details:     Location:  R femoral    Needle gauge:  20 G    Placement technique:  Seldinger    Number of attempts:  1    Transducer: waveform confirmed    Post-procedure details:     Post-procedure:  Biopatch applied    CMS:  Normal    Patient tolerance of procedure: Tolerated well, no immediate complications      FINAL IMPRESSION      1. Septic shock ([address]    2. COVID-19          DISPOSITION/PLAN   DISPOSITION  2021 03:44:07 AM    (Please note that portions ofthis note were completed with a voice recognition program.  Efforts were made to edit the dictations but occasionally words are mis-transcribed.)    Reyna Varma MD(electronically signed)  Attending Emergency Physician             Reyna Varma MD  [phone]

## 2023-07-28 NOTE — PROGRESS NOTES
Pt transported via [facility] to [address] [facility], room 507. Called to give report, they will call back. Pt was stable upon discharge. No thoughts of harming self or others at that time. Report given to [facility] on 6S
